# Patient Record
Sex: MALE | Race: WHITE | NOT HISPANIC OR LATINO | Employment: FULL TIME | URBAN - METROPOLITAN AREA
[De-identification: names, ages, dates, MRNs, and addresses within clinical notes are randomized per-mention and may not be internally consistent; named-entity substitution may affect disease eponyms.]

---

## 2015-08-05 LAB — CRC RECOMMENDATION EXT: NORMAL

## 2017-11-01 ENCOUNTER — DOCUMENTATION ONLY (OUTPATIENT)
Dept: TRANSPLANT | Facility: CLINIC | Age: 47
End: 2017-11-01

## 2017-11-14 ENCOUNTER — HISTORICAL (OUTPATIENT)
Dept: RESPIRATORY THERAPY | Facility: HOSPITAL | Age: 47
End: 2017-11-14

## 2018-03-19 ENCOUNTER — HISTORICAL (OUTPATIENT)
Dept: ADMINISTRATIVE | Facility: HOSPITAL | Age: 48
End: 2018-03-19

## 2018-03-19 LAB
ALBUMIN SERPL-MCNC: 4 GM/DL (ref 3.4–5)
ALBUMIN/GLOB SERPL: 1.2 RATIO (ref 1.1–2)
ALP SERPL-CCNC: 92 UNIT/L (ref 50–136)
ALT SERPL-CCNC: 26 UNIT/L (ref 12–78)
AST SERPL-CCNC: 16 UNIT/L (ref 15–37)
BILIRUB SERPL-MCNC: 0.4 MG/DL (ref 0.2–1)
BILIRUBIN DIRECT+TOT PNL SERPL-MCNC: 0.1 MG/DL (ref 0–0.5)
BILIRUBIN DIRECT+TOT PNL SERPL-MCNC: 0.3 MG/DL (ref 0–0.8)
BUN SERPL-MCNC: 18 MG/DL (ref 7–18)
CALCIUM SERPL-MCNC: 9.9 MG/DL (ref 8.5–10.1)
CHLORIDE SERPL-SCNC: 110 MMOL/L (ref 98–107)
CO2 SERPL-SCNC: 26 MMOL/L (ref 21–32)
CREAT SERPL-MCNC: 1.28 MG/DL (ref 0.7–1.3)
GLOBULIN SER-MCNC: 3.4 GM/DL (ref 2.4–3.5)
GLUCOSE SERPL-MCNC: 103 MG/DL (ref 74–106)
POTASSIUM SERPL-SCNC: 4.6 MMOL/L (ref 3.5–5.1)
PROT SERPL-MCNC: 7.4 GM/DL (ref 6.4–8.2)
SODIUM SERPL-SCNC: 144 MMOL/L (ref 136–145)
T4 SERPL-MCNC: 7.4 MCG/DL (ref 4.7–13.3)
TSH SERPL-ACNC: 0.98 MIU/ML (ref 0.36–3.74)

## 2021-02-16 LAB
INFLUENZA A ANTIGEN, POC: NEGATIVE
INFLUENZA B ANTIGEN, POC: NEGATIVE
SARS-COV-2 RNA RESP QL NAA+PROBE: NEGATIVE

## 2021-06-17 ENCOUNTER — HISTORICAL (OUTPATIENT)
Dept: ADMINISTRATIVE | Facility: HOSPITAL | Age: 51
End: 2021-06-17

## 2021-06-17 LAB
ABS NEUT (OLG): 3.78 X10(3)/MCL (ref 2.1–9.2)
ALBUMIN SERPL-MCNC: 4.3 GM/DL (ref 3.5–5)
ALBUMIN/GLOB SERPL: 1.3 RATIO (ref 1.1–2)
ALP SERPL-CCNC: 124 UNIT/L (ref 40–150)
ALT SERPL-CCNC: 21 UNIT/L (ref 0–55)
AST SERPL-CCNC: 21 UNIT/L (ref 5–34)
BASOPHILS # BLD AUTO: 0 X10(3)/MCL (ref 0–0.2)
BASOPHILS NFR BLD AUTO: 1 %
BILIRUB SERPL-MCNC: 1.2 MG/DL
BILIRUBIN DIRECT+TOT PNL SERPL-MCNC: 0.3 MG/DL (ref 0–0.5)
BILIRUBIN DIRECT+TOT PNL SERPL-MCNC: 0.9 MG/DL (ref 0–0.8)
BUN SERPL-MCNC: 17.9 MG/DL (ref 8.4–25.7)
CALCIUM SERPL-MCNC: 10.5 MG/DL (ref 8.4–10.2)
CHLORIDE SERPL-SCNC: 105 MMOL/L (ref 98–107)
CK SERPL-CCNC: 140 U/L (ref 30–200)
CO2 SERPL-SCNC: 29 MMOL/L (ref 22–29)
CREAT SERPL-MCNC: 1.16 MG/DL (ref 0.73–1.18)
EOSINOPHIL # BLD AUTO: 0.1 X10(3)/MCL (ref 0–0.9)
EOSINOPHIL NFR BLD AUTO: 2 %
ERYTHROCYTE [DISTWIDTH] IN BLOOD BY AUTOMATED COUNT: 12.3 % (ref 11.5–14.5)
EST CREAT CLEARANCE SER (OHS): 81.76 ML/MIN
FOLATE SERPL-MCNC: 9.2 NG/ML (ref 7–31.4)
GLOBULIN SER-MCNC: 3.3 GM/DL (ref 2.4–3.5)
GLUCOSE SERPL-MCNC: 98 MG/DL (ref 74–100)
HCT VFR BLD AUTO: 47.2 % (ref 40–51)
HGB BLD-MCNC: 16.7 GM/DL (ref 13.5–17.5)
IMM GRANULOCYTES # BLD AUTO: 0.01 10*3/UL
IMM GRANULOCYTES NFR BLD AUTO: 0 %
LYMPHOCYTES # BLD AUTO: 1.3 X10(3)/MCL (ref 0.6–4.6)
LYMPHOCYTES NFR BLD AUTO: 22 %
MCH RBC QN AUTO: 31.4 PG (ref 26–34)
MCHC RBC AUTO-ENTMCNC: 35.4 GM/DL (ref 31–37)
MCV RBC AUTO: 88.7 FL (ref 80–100)
MONOCYTES # BLD AUTO: 0.5 X10(3)/MCL (ref 0.1–1.3)
MONOCYTES NFR BLD AUTO: 9 %
NEUTROPHILS # BLD AUTO: 3.78 X10(3)/MCL (ref 2.1–9.2)
NEUTROPHILS NFR BLD AUTO: 66 %
NRBC BLD AUTO-RTO: 0 % (ref 0–0.2)
PLATELET # BLD AUTO: 232 X10(3)/MCL (ref 130–400)
PMV BLD AUTO: 10.1 FL (ref 7.4–10.4)
POTASSIUM SERPL-SCNC: 4.1 MMOL/L (ref 3.5–5.1)
PROT SERPL-MCNC: 7.6 GM/DL (ref 6.4–8.3)
RBC # BLD AUTO: 5.32 X10(6)/MCL (ref 4.5–5.9)
SODIUM SERPL-SCNC: 141 MMOL/L (ref 136–145)
TSH SERPL-ACNC: 1.7 UIU/ML (ref 0.35–4.94)
VIT B12 SERPL-MCNC: 375 PG/ML (ref 213–816)
WBC # SPEC AUTO: 5.8 X10(3)/MCL (ref 4.5–11)

## 2021-07-08 ENCOUNTER — HISTORICAL (OUTPATIENT)
Dept: RADIOLOGY | Facility: HOSPITAL | Age: 51
End: 2021-07-08

## 2021-09-01 ENCOUNTER — HISTORICAL (OUTPATIENT)
Dept: RADIOLOGY | Facility: HOSPITAL | Age: 51
End: 2021-09-01

## 2022-02-04 ENCOUNTER — HISTORICAL (OUTPATIENT)
Dept: ADMINISTRATIVE | Facility: HOSPITAL | Age: 52
End: 2022-02-04

## 2022-02-05 LAB — SARS-COV-2 RNA RESP QL NAA+PROBE: NOT DETECTED

## 2022-02-07 ENCOUNTER — HISTORICAL (OUTPATIENT)
Dept: ADMINISTRATIVE | Facility: HOSPITAL | Age: 52
End: 2022-02-07

## 2022-02-11 ENCOUNTER — HISTORICAL (OUTPATIENT)
Dept: ADMINISTRATIVE | Facility: HOSPITAL | Age: 52
End: 2022-02-11

## 2022-02-21 ENCOUNTER — HISTORICAL (OUTPATIENT)
Dept: ADMINISTRATIVE | Facility: HOSPITAL | Age: 52
End: 2022-02-21

## 2022-02-21 LAB
ABS NEUT (OLG): 3.75 (ref 2.1–9.2)
BASOPHILS # BLD AUTO: 0.1 10*3/UL (ref 0–0.2)
BASOPHILS NFR BLD AUTO: 1 %
BUN SERPL-MCNC: 13 MG/DL (ref 8.4–25.7)
CALCIUM SERPL-MCNC: 11.1 MG/DL (ref 8.7–10.5)
CHLORIDE SERPL-SCNC: 103 MMOL/L (ref 98–107)
CO2 SERPL-SCNC: 30 MMOL/L (ref 22–29)
CREAT SERPL-MCNC: 1.18 MG/DL (ref 0.73–1.18)
CREAT/UREA NIT SERPL: 11
DEPRECATED CALCIDIOL+CALCIFEROL SERPL-MC: 27.1 NG/ML (ref 30–80)
EOSINOPHIL # BLD AUTO: 0.1 10*3/UL (ref 0–0.9)
EOSINOPHIL NFR BLD AUTO: 2 %
ERYTHROCYTE [DISTWIDTH] IN BLOOD BY AUTOMATED COUNT: 12.2 % (ref 11.5–17)
GLUCOSE SERPL-MCNC: 101 MG/DL (ref 74–100)
HCT VFR BLD AUTO: 51.1 % (ref 42–52)
HEMOLYSIS INTERF INDEX SERPL-ACNC: 15
HGB BLD-MCNC: 17.1 G/DL (ref 14–18)
ICTERIC INTERF INDEX SERPL-ACNC: 1
LIPEMIC INTERF INDEX SERPL-ACNC: 18
LYMPHOCYTES # BLD AUTO: 1.9 10*3/UL (ref 0.6–4.6)
LYMPHOCYTES NFR BLD AUTO: 30 %
MANUAL DIFF? (OHS): NO
MCH RBC QN AUTO: 30.1 PG (ref 27–31)
MCHC RBC AUTO-ENTMCNC: 33.5 G/DL (ref 33–36)
MCV RBC AUTO: 90 FL (ref 80–94)
MONOCYTES # BLD AUTO: 0.5 10*3/UL (ref 0.1–1.3)
MONOCYTES NFR BLD AUTO: 8 %
NEUTROPHILS # BLD AUTO: 3.75 10*3/UL (ref 2.1–9.2)
NEUTROPHILS NFR BLD AUTO: 59 %
PLATELET # BLD AUTO: 276 10*3/UL (ref 130–400)
PMV BLD AUTO: 9.8 FL (ref 9.4–12.4)
POTASSIUM SERPL-SCNC: 4.7 MMOL/L (ref 3.5–5.1)
RBC # BLD AUTO: 5.68 10*6/UL (ref 4.7–6.1)
SODIUM SERPL-SCNC: 143 MMOL/L (ref 136–145)
WBC # SPEC AUTO: 6.4 10*3/UL (ref 4.5–11.5)

## 2022-03-02 ENCOUNTER — HISTORICAL (OUTPATIENT)
Dept: ADMINISTRATIVE | Facility: HOSPITAL | Age: 52
End: 2022-03-02

## 2022-03-02 LAB
BUN SERPL-MCNC: 12.5 MG/DL (ref 8.4–25.7)
CALCIUM SERPL-MCNC: 11 MG/DL (ref 8.7–10.5)
CHLORIDE SERPL-SCNC: 105 MMOL/L (ref 98–107)
CO2 SERPL-SCNC: 23 MMOL/L (ref 22–29)
CREAT SERPL-MCNC: 1.15 MG/DL (ref 0.73–1.18)
CREAT/UREA NIT SERPL: 11
GLUCOSE SERPL-MCNC: 91 MG/DL (ref 74–100)
HEMOLYSIS INTERF INDEX SERPL-ACNC: 37
ICTERIC INTERF INDEX SERPL-ACNC: 2
LIPEMIC INTERF INDEX SERPL-ACNC: 6
POTASSIUM SERPL-SCNC: 4.8 MMOL/L (ref 3.5–5.1)
PTH-INTACT SERPL-MCNC: 199.2 PG/ML (ref 8.7–77.1)
SODIUM SERPL-SCNC: 140 MMOL/L (ref 136–145)

## 2022-03-07 ENCOUNTER — HISTORICAL (OUTPATIENT)
Dept: ADMINISTRATIVE | Facility: HOSPITAL | Age: 52
End: 2022-03-07

## 2022-03-07 LAB — SARS-COV-2 AG RESP QL IA.RAPID: NEGATIVE

## 2022-03-08 ENCOUNTER — HISTORICAL (OUTPATIENT)
Dept: ADMINISTRATIVE | Facility: HOSPITAL | Age: 52
End: 2022-03-08

## 2022-03-08 ENCOUNTER — HOSPITAL ENCOUNTER (OUTPATIENT)
Dept: ONCOLOGY | Facility: HOSPITAL | Age: 52
End: 2022-03-13
Attending: NEUROLOGICAL SURGERY | Admitting: NEUROLOGICAL SURGERY

## 2022-03-08 LAB
APPEARANCE, UA: NORMAL
BACTERIA SPEC CULT: NORMAL
BILIRUB UR QL STRIP: NEGATIVE
CBG: 86 (ref 70–115)
COLOR UR: YELLOW
GLUCOSE (UA): NEGATIVE
HGB UR QL STRIP: NEGATIVE
KETONES UR QL STRIP: NEGATIVE
LEUKOCYTE ESTERASE UR QL STRIP: NEGATIVE
NITRITE UR QL STRIP: NEGATIVE
PH UR STRIP: 6 [PH] (ref 5–9)
PROT UR QL STRIP: NEGATIVE
RBC #/AREA URNS HPF: NORMAL /[HPF] (ref 0–2)
SP GR UR STRIP: 1.02 (ref 1–1.03)
SQUAMOUS EPITHELIAL, UA: NORMAL (ref 0–4)
UROBILINOGEN UR STRIP-ACNC: 0.2
WBC #/AREA URNS HPF: NORMAL /[HPF] (ref 0–2)

## 2022-03-09 LAB
ABS NEUT (OLG): 5.79 (ref 2.1–9.2)
BASOPHILS # BLD AUTO: 0 10*3/UL (ref 0–0.2)
BASOPHILS NFR BLD AUTO: 1 %
BUN SERPL-MCNC: 11 MG/DL (ref 8.4–25.7)
CALCIUM SERPL-MCNC: 9.2 MG/DL (ref 8.7–10.5)
CHLORIDE SERPL-SCNC: 102 MMOL/L (ref 98–107)
CO2 SERPL-SCNC: 24 MMOL/L (ref 22–29)
CREAT SERPL-MCNC: 0.97 MG/DL (ref 0.73–1.18)
CREAT/UREA NIT SERPL: 11
EOSINOPHIL # BLD AUTO: 0.1 10*3/UL (ref 0–0.9)
EOSINOPHIL NFR BLD AUTO: 2 %
ERYTHROCYTE [DISTWIDTH] IN BLOOD BY AUTOMATED COUNT: 12.4 % (ref 11.5–17)
GLUCOSE SERPL-MCNC: 101 MG/DL (ref 74–100)
HCT VFR BLD AUTO: 42.1 % (ref 42–52)
HEMOLYSIS INTERF INDEX SERPL-ACNC: 1
HGB BLD-MCNC: 14.4 G/DL (ref 14–18)
ICTERIC INTERF INDEX SERPL-ACNC: 1
LIPEMIC INTERF INDEX SERPL-ACNC: 1
LYMPHOCYTES # BLD AUTO: 1.2 10*3/UL (ref 0.6–4.6)
LYMPHOCYTES NFR BLD AUTO: 16 %
MANUAL DIFF? (OHS): NO
MCH RBC QN AUTO: 30.8 PG (ref 27–31)
MCHC RBC AUTO-ENTMCNC: 34.2 G/DL (ref 33–36)
MCV RBC AUTO: 90.1 FL (ref 80–94)
MONOCYTES # BLD AUTO: 0.7 10*3/UL (ref 0.1–1.3)
MONOCYTES NFR BLD AUTO: 9 %
NEUTROPHILS # BLD AUTO: 5.79 10*3/UL (ref 2.1–9.2)
NEUTROPHILS NFR BLD AUTO: 72 %
PLATELET # BLD AUTO: 202 10*3/UL (ref 130–400)
PMV BLD AUTO: 10.1 FL (ref 9.4–12.4)
POTASSIUM SERPL-SCNC: 3.9 MMOL/L (ref 3.5–5.1)
RBC # BLD AUTO: 4.67 10*6/UL (ref 4.7–6.1)
SODIUM SERPL-SCNC: 135 MMOL/L (ref 136–145)
WBC # SPEC AUTO: 8 10*3/UL (ref 4.5–11.5)

## 2022-03-20 ENCOUNTER — HISTORICAL (OUTPATIENT)
Dept: ADMINISTRATIVE | Facility: HOSPITAL | Age: 52
End: 2022-03-20

## 2022-03-21 ENCOUNTER — HISTORICAL (OUTPATIENT)
Dept: ADMINISTRATIVE | Facility: HOSPITAL | Age: 52
End: 2022-03-21

## 2022-04-09 ENCOUNTER — HISTORICAL (OUTPATIENT)
Dept: ADMINISTRATIVE | Facility: HOSPITAL | Age: 52
End: 2022-04-09
Payer: COMMERCIAL

## 2022-04-27 VITALS
SYSTOLIC BLOOD PRESSURE: 144 MMHG | BODY MASS INDEX: 31.23 KG/M2 | WEIGHT: 230.56 LBS | DIASTOLIC BLOOD PRESSURE: 86 MMHG | OXYGEN SATURATION: 97 % | HEIGHT: 72 IN

## 2022-05-02 DIAGNOSIS — E21.0 PRIMARY HYPERPARATHYROIDISM: Primary | ICD-10-CM

## 2022-05-03 ENCOUNTER — LAB VISIT (OUTPATIENT)
Dept: LAB | Facility: HOSPITAL | Age: 52
End: 2022-05-03
Attending: OTOLARYNGOLOGY
Payer: COMMERCIAL

## 2022-05-03 ENCOUNTER — HOSPITAL ENCOUNTER (OUTPATIENT)
Dept: RADIOLOGY | Facility: HOSPITAL | Age: 52
Discharge: HOME | End: 2022-05-03
Attending: OTOLARYNGOLOGY
Payer: COMMERCIAL

## 2022-05-03 DIAGNOSIS — Z01.818 PRE-OP TESTING: ICD-10-CM

## 2022-05-03 LAB
ANION GAP SERPL CALC-SCNC: 9 MEQ/L
BASOPHILS # BLD AUTO: 0.05 X10(3)/MCL (ref 0–0.2)
BASOPHILS NFR BLD AUTO: 0.9 %
BUN SERPL-MCNC: 12.6 MG/DL (ref 8.4–25.7)
CALCIUM SERPL-MCNC: 10.3 MG/DL (ref 8.4–10.2)
CHLORIDE SERPL-SCNC: 104 MMOL/L (ref 98–107)
CO2 SERPL-SCNC: 27 MMOL/L (ref 22–29)
CREAT SERPL-MCNC: 1.08 MG/DL (ref 0.73–1.18)
CREAT/UREA NIT SERPL: 12
EOSINOPHIL # BLD AUTO: 0.17 X10(3)/MCL (ref 0–0.9)
EOSINOPHIL NFR BLD AUTO: 3 %
ERYTHROCYTE [DISTWIDTH] IN BLOOD BY AUTOMATED COUNT: 12.9 % (ref 11.5–17)
GLUCOSE SERPL-MCNC: 90 MG/DL (ref 74–100)
HCT VFR BLD AUTO: 44.1 % (ref 42–52)
HGB BLD-MCNC: 14.8 GM/DL (ref 14–18)
IMM GRANULOCYTES # BLD AUTO: 0.02 X10(3)/MCL (ref 0–0.02)
IMM GRANULOCYTES NFR BLD AUTO: 0.4 % (ref 0–0.43)
LYMPHOCYTES # BLD AUTO: 2.13 X10(3)/MCL (ref 0.6–4.6)
LYMPHOCYTES NFR BLD AUTO: 37.9 %
MCH RBC QN AUTO: 30 PG (ref 27–31)
MCHC RBC AUTO-ENTMCNC: 33.6 MG/DL (ref 33–36)
MCV RBC AUTO: 89.5 FL (ref 80–94)
MONOCYTES # BLD AUTO: 0.5 X10(3)/MCL (ref 0.1–1.3)
MONOCYTES NFR BLD AUTO: 8.9 %
NEUTROPHILS # BLD AUTO: 2.8 X10(3)/MCL (ref 2.1–9.2)
NEUTROPHILS NFR BLD AUTO: 48.9 %
NRBC BLD AUTO-RTO: 0 %
PLATELET # BLD AUTO: 327 X10(3)/MCL (ref 130–400)
PMV BLD AUTO: 9.6 FL (ref 9.4–12.4)
POTASSIUM SERPL-SCNC: 4.8 MMOL/L (ref 3.5–5.1)
RBC # BLD AUTO: 4.93 X10(6)/MCL (ref 4.7–6.1)
SARS-COV-2 RNA RESP QL NAA+PROBE: NOT DETECTED
SODIUM SERPL-SCNC: 140 MMOL/L (ref 136–145)
WBC # SPEC AUTO: 5.6 X10(3)/MCL (ref 4.5–11.5)

## 2022-05-03 PROCEDURE — 82310 ASSAY OF CALCIUM: CPT

## 2022-05-03 PROCEDURE — 85025 COMPLETE CBC W/AUTO DIFF WBC: CPT

## 2022-05-03 PROCEDURE — 87635 SARS-COV-2 COVID-19 AMP PRB: CPT

## 2022-05-03 PROCEDURE — 93005 ELECTROCARDIOGRAM TRACING: CPT

## 2022-05-03 PROCEDURE — 93010 EKG 12-LEAD: ICD-10-PCS | Mod: ,,, | Performed by: INTERNAL MEDICINE

## 2022-05-03 PROCEDURE — 93010 ELECTROCARDIOGRAM REPORT: CPT | Mod: ,,, | Performed by: INTERNAL MEDICINE

## 2022-05-03 PROCEDURE — 71046 X-RAY EXAM CHEST 2 VIEWS: CPT | Mod: TC

## 2022-05-03 PROCEDURE — 36415 COLL VENOUS BLD VENIPUNCTURE: CPT

## 2022-05-04 ENCOUNTER — HOSPITAL ENCOUNTER (OUTPATIENT)
Dept: RADIOLOGY | Facility: HOSPITAL | Age: 52
Discharge: HOME | End: 2022-05-04
Attending: OTOLARYNGOLOGY
Payer: COMMERCIAL

## 2022-05-04 ENCOUNTER — ANESTHESIA EVENT (OUTPATIENT)
Dept: SURGERY | Facility: HOSPITAL | Age: 52
End: 2022-05-04
Payer: COMMERCIAL

## 2022-05-04 DIAGNOSIS — E21.0 PRIMARY HYPERPARATHYROIDISM: ICD-10-CM

## 2022-05-04 PROCEDURE — 78071 PARATHYRD PLANAR W/WO SUBTRJ: CPT | Mod: TC

## 2022-05-05 RX ORDER — GABAPENTIN 300 MG/1
300 CAPSULE ORAL DAILY
COMMUNITY
End: 2022-06-13

## 2022-05-05 RX ORDER — ORPHENADRINE CITRATE 100 MG/1
100 TABLET, EXTENDED RELEASE ORAL 2 TIMES DAILY
COMMUNITY
End: 2022-06-13 | Stop reason: SDUPTHER

## 2022-05-05 RX ORDER — OLMESARTAN MEDOXOMIL 5 MG/1
TABLET ORAL 2 TIMES DAILY
COMMUNITY
End: 2022-05-12 | Stop reason: SDUPTHER

## 2022-05-06 ENCOUNTER — HOSPITAL ENCOUNTER (OUTPATIENT)
Facility: HOSPITAL | Age: 52
Discharge: HOME | End: 2022-05-07
Attending: OTOLARYNGOLOGY | Admitting: OTOLARYNGOLOGY
Payer: COMMERCIAL

## 2022-05-06 ENCOUNTER — ANESTHESIA (OUTPATIENT)
Dept: SURGERY | Facility: HOSPITAL | Age: 52
End: 2022-05-06
Payer: COMMERCIAL

## 2022-05-06 DIAGNOSIS — E21.0 PRIMARY HYPERPARATHYROIDISM: ICD-10-CM

## 2022-05-06 DIAGNOSIS — Z01.818 PRE-OP TESTING: ICD-10-CM

## 2022-05-06 DIAGNOSIS — E21.0 HYPERPARATHYROIDISM, PRIMARY: Primary | ICD-10-CM

## 2022-05-06 LAB
PTH-INTACT SERPL-MCNC: 161.1 PG/ML (ref 8.7–77)
PTH-INTACT SERPL-MCNC: 73.1 PG/ML (ref 8.7–77)
TSH SERPL-ACNC: 1.48 UIU/ML (ref 0.35–4.94)

## 2022-05-06 PROCEDURE — 36415 COLL VENOUS BLD VENIPUNCTURE: CPT | Performed by: OTOLARYNGOLOGY

## 2022-05-06 PROCEDURE — 63600175 PHARM REV CODE 636 W HCPCS: Performed by: OTOLARYNGOLOGY

## 2022-05-06 PROCEDURE — 25000003 PHARM REV CODE 250: Performed by: OTOLARYNGOLOGY

## 2022-05-06 PROCEDURE — 96372 THER/PROPH/DIAG INJ SC/IM: CPT | Mod: 59

## 2022-05-06 PROCEDURE — 36000706: Performed by: OTOLARYNGOLOGY

## 2022-05-06 PROCEDURE — 63600175 PHARM REV CODE 636 W HCPCS: Performed by: NURSE ANESTHETIST, CERTIFIED REGISTERED

## 2022-05-06 PROCEDURE — 96375 TX/PRO/DX INJ NEW DRUG ADDON: CPT | Mod: 59

## 2022-05-06 PROCEDURE — 36000707: Performed by: OTOLARYNGOLOGY

## 2022-05-06 PROCEDURE — 37000008 HC ANESTHESIA 1ST 15 MINUTES: Performed by: OTOLARYNGOLOGY

## 2022-05-06 PROCEDURE — 83970 ASSAY OF PARATHORMONE: CPT | Performed by: OTOLARYNGOLOGY

## 2022-05-06 PROCEDURE — 84443 ASSAY THYROID STIM HORMONE: CPT | Performed by: OTOLARYNGOLOGY

## 2022-05-06 PROCEDURE — C1729 CATH, DRAINAGE: HCPCS | Performed by: OTOLARYNGOLOGY

## 2022-05-06 PROCEDURE — 63600175 PHARM REV CODE 636 W HCPCS

## 2022-05-06 PROCEDURE — 71000033 HC RECOVERY, INTIAL HOUR: Performed by: OTOLARYNGOLOGY

## 2022-05-06 PROCEDURE — 25000003 PHARM REV CODE 250: Performed by: NURSE ANESTHETIST, CERTIFIED REGISTERED

## 2022-05-06 PROCEDURE — G0378 HOSPITAL OBSERVATION PER HR: HCPCS

## 2022-05-06 PROCEDURE — 27201423 OPTIME MED/SURG SUP & DEVICES STERILE SUPPLY: Performed by: OTOLARYNGOLOGY

## 2022-05-06 PROCEDURE — 37000009 HC ANESTHESIA EA ADD 15 MINS: Performed by: OTOLARYNGOLOGY

## 2022-05-06 PROCEDURE — 71000039 HC RECOVERY, EACH ADD'L HOUR: Performed by: OTOLARYNGOLOGY

## 2022-05-06 RX ORDER — DEXTROSE MONOHYDRATE, SODIUM CHLORIDE, AND POTASSIUM CHLORIDE 50; 1.49; 4.5 G/1000ML; G/1000ML; G/1000ML
INJECTION, SOLUTION INTRAVENOUS CONTINUOUS
Status: DISCONTINUED | OUTPATIENT
Start: 2022-05-06 | End: 2022-05-07 | Stop reason: HOSPADM

## 2022-05-06 RX ORDER — ONDANSETRON 2 MG/ML
4 INJECTION INTRAMUSCULAR; INTRAVENOUS EVERY 12 HOURS PRN
Status: DISCONTINUED | OUTPATIENT
Start: 2022-05-06 | End: 2022-05-07 | Stop reason: HOSPADM

## 2022-05-06 RX ORDER — LOSARTAN POTASSIUM 25 MG/1
25 TABLET ORAL DAILY
Status: DISCONTINUED | OUTPATIENT
Start: 2022-05-07 | End: 2022-05-07 | Stop reason: HOSPADM

## 2022-05-06 RX ORDER — ONDANSETRON 2 MG/ML
4 INJECTION INTRAMUSCULAR; INTRAVENOUS DAILY PRN
Status: DISCONTINUED | OUTPATIENT
Start: 2022-05-06 | End: 2022-05-07 | Stop reason: HOSPADM

## 2022-05-06 RX ORDER — FENTANYL CITRATE 50 UG/ML
INJECTION, SOLUTION INTRAMUSCULAR; INTRAVENOUS
Status: DISCONTINUED | OUTPATIENT
Start: 2022-05-06 | End: 2022-05-06

## 2022-05-06 RX ORDER — SUCCINYLCHOLINE CHLORIDE 20 MG/ML
INJECTION INTRAMUSCULAR; INTRAVENOUS
Status: DISCONTINUED | OUTPATIENT
Start: 2022-05-06 | End: 2022-05-06

## 2022-05-06 RX ORDER — CLINDAMYCIN PHOSPHATE 900 MG/50ML
900 INJECTION, SOLUTION INTRAVENOUS
Status: DISCONTINUED | OUTPATIENT
Start: 2022-05-06 | End: 2022-05-06

## 2022-05-06 RX ORDER — MIDAZOLAM HYDROCHLORIDE 1 MG/ML
INJECTION INTRAMUSCULAR; INTRAVENOUS
Status: DISCONTINUED | OUTPATIENT
Start: 2022-05-06 | End: 2022-05-06

## 2022-05-06 RX ORDER — TRAMADOL HYDROCHLORIDE 50 MG/1
50 TABLET ORAL EVERY 6 HOURS PRN
Status: ON HOLD | COMMUNITY
Start: 2022-03-23 | End: 2022-05-07 | Stop reason: SDUPTHER

## 2022-05-06 RX ORDER — CLINDAMYCIN PHOSPHATE 600 MG/50ML
600 INJECTION, SOLUTION INTRAVENOUS
Status: DISCONTINUED | OUTPATIENT
Start: 2022-05-07 | End: 2022-05-07 | Stop reason: HOSPADM

## 2022-05-06 RX ORDER — LIDOCAINE HYDROCHLORIDE AND EPINEPHRINE 20; 10 MG/ML; UG/ML
INJECTION, SOLUTION INFILTRATION; PERINEURAL
Status: DISCONTINUED | OUTPATIENT
Start: 2022-05-06 | End: 2022-05-06 | Stop reason: HOSPADM

## 2022-05-06 RX ORDER — OXYCODONE HYDROCHLORIDE 5 MG/1
5 TABLET ORAL
Status: DISCONTINUED | OUTPATIENT
Start: 2022-05-06 | End: 2022-05-07 | Stop reason: HOSPADM

## 2022-05-06 RX ORDER — PROMETHAZINE HYDROCHLORIDE 25 MG/ML
6.25 INJECTION, SOLUTION INTRAMUSCULAR; INTRAVENOUS ONCE
Status: COMPLETED | OUTPATIENT
Start: 2022-05-06 | End: 2022-05-06

## 2022-05-06 RX ORDER — SODIUM CHLORIDE 0.9 % (FLUSH) 0.9 %
10 SYRINGE (ML) INJECTION
Status: DISCONTINUED | OUTPATIENT
Start: 2022-05-06 | End: 2022-05-07 | Stop reason: HOSPADM

## 2022-05-06 RX ORDER — PROCHLORPERAZINE EDISYLATE 5 MG/ML
5 INJECTION INTRAMUSCULAR; INTRAVENOUS EVERY 6 HOURS PRN
Status: DISCONTINUED | OUTPATIENT
Start: 2022-05-06 | End: 2022-05-07 | Stop reason: HOSPADM

## 2022-05-06 RX ORDER — PROPOFOL 10 MG/ML
VIAL (ML) INTRAVENOUS
Status: DISCONTINUED | OUTPATIENT
Start: 2022-05-06 | End: 2022-05-06

## 2022-05-06 RX ORDER — CLINDAMYCIN PHOSPHATE 900 MG/50ML
INJECTION, SOLUTION INTRAVENOUS
Status: DISCONTINUED | OUTPATIENT
Start: 2022-05-06 | End: 2022-05-06

## 2022-05-06 RX ORDER — ORPHENADRINE CITRATE 100 MG/1
100 TABLET, EXTENDED RELEASE ORAL 2 TIMES DAILY
Status: DISCONTINUED | OUTPATIENT
Start: 2022-05-06 | End: 2022-05-07 | Stop reason: HOSPADM

## 2022-05-06 RX ORDER — GABAPENTIN 300 MG/1
300 CAPSULE ORAL DAILY
Status: DISCONTINUED | OUTPATIENT
Start: 2022-05-07 | End: 2022-05-07 | Stop reason: HOSPADM

## 2022-05-06 RX ORDER — FENTANYL CITRATE 50 UG/ML
25 INJECTION, SOLUTION INTRAMUSCULAR; INTRAVENOUS EVERY 5 MIN PRN
Status: DISCONTINUED | OUTPATIENT
Start: 2022-05-06 | End: 2022-05-07 | Stop reason: HOSPADM

## 2022-05-06 RX ORDER — ROCURONIUM BROMIDE 10 MG/ML
INJECTION, SOLUTION INTRAVENOUS
Status: DISCONTINUED | OUTPATIENT
Start: 2022-05-06 | End: 2022-05-06

## 2022-05-06 RX ORDER — TRAMADOL HYDROCHLORIDE 50 MG/1
100 TABLET ORAL EVERY 6 HOURS PRN
Status: DISCONTINUED | OUTPATIENT
Start: 2022-05-06 | End: 2022-05-07 | Stop reason: HOSPADM

## 2022-05-06 RX ORDER — PROMETHAZINE HYDROCHLORIDE 25 MG/ML
INJECTION, SOLUTION INTRAMUSCULAR; INTRAVENOUS
Status: COMPLETED
Start: 2022-05-06 | End: 2022-05-06

## 2022-05-06 RX ORDER — PHENYLEPHRINE HCL IN 0.9% NACL 1 MG/10 ML
SYRINGE (ML) INTRAVENOUS
Status: DISCONTINUED | OUTPATIENT
Start: 2022-05-06 | End: 2022-05-06

## 2022-05-06 RX ADMIN — Medication 100 MCG: at 06:05

## 2022-05-06 RX ADMIN — ROCURONIUM BROMIDE 5 MG: 10 SOLUTION INTRAVENOUS at 05:05

## 2022-05-06 RX ADMIN — FENTANYL CITRATE 100 MCG: 50 INJECTION, SOLUTION INTRAMUSCULAR; INTRAVENOUS at 05:05

## 2022-05-06 RX ADMIN — ONDANSETRON 4 MG: 2 INJECTION INTRAMUSCULAR; INTRAVENOUS at 07:05

## 2022-05-06 RX ADMIN — FENTANYL CITRATE 50 MCG: 50 INJECTION, SOLUTION INTRAMUSCULAR; INTRAVENOUS at 06:05

## 2022-05-06 RX ADMIN — PROPOFOL 150 MG: 10 INJECTION, EMULSION INTRAVENOUS at 05:05

## 2022-05-06 RX ADMIN — MIDAZOLAM HYDROCHLORIDE 2 MG: 1 INJECTION, SOLUTION INTRAMUSCULAR; INTRAVENOUS at 05:05

## 2022-05-06 RX ADMIN — Medication 150 MCG: at 05:05

## 2022-05-06 RX ADMIN — ORPHENADRINE CITRATE 100 MG: 100 TABLET, EXTENDED RELEASE ORAL at 11:05

## 2022-05-06 RX ADMIN — SUCCINYLCHOLINE CHLORIDE 140 MG: 20 INJECTION, SOLUTION INTRAMUSCULAR; INTRAVENOUS at 05:05

## 2022-05-06 RX ADMIN — Medication 100 MCG: at 05:05

## 2022-05-06 RX ADMIN — CLINDAMYCIN IN 5 PERCENT DEXTROSE 900 MG: 18 INJECTION, SOLUTION INTRAVENOUS at 05:05

## 2022-05-06 RX ADMIN — PROMETHAZINE HYDROCHLORIDE 6.25 MG: 25 INJECTION, SOLUTION INTRAMUSCULAR; INTRAVENOUS at 09:05

## 2022-05-06 NOTE — ANESTHESIA PREPROCEDURE EVALUATION
05/06/2022  Eb Alfaro is a 51 y.o., male.      Pre-op Assessment    I have reviewed the Patient Summary Reports.          Review of Systems  Anesthesia Hx:  History of prior surgery of interest to airway management or planning: Previous anesthesia: General       Physical Exam  General: Well nourished    Airway:  Mallampati: III   Mouth Opening: Normal  TM Distance: Normal  Tongue: Normal  Neck ROM: Extension Decreased    Dental:  Intact        Anesthesia Plan  Type of Anesthesia, risks & benefits discussed:    Anesthesia Type: Gen ETT  Intra-op Monitoring Plan: Standard ASA Monitors  Post Op Pain Control Plan: IV/PO Opioids PRN  Induction:  IV  Airway Plan: Direct and Video  Informed Consent: Informed consent signed with the Patient and all parties understand the risks and agree with anesthesia plan.  All questions answered. Patient consented to blood products? Yes  ASA Score: 2    Ready For Surgery From Anesthesia Perspective.     .

## 2022-05-06 NOTE — ANESTHESIA PREPROCEDURE EVALUATION
05/06/2022  Eb Alfaro is a 51 y.o., male.      Pre-op Assessment    I have reviewed the Patient Summary Reports.     I have reviewed the Nursing Notes. I have reviewed the NPO Status.   I have reviewed the Medications.     Review of Systems  Anesthesia Hx:  No problems with previous Anesthesia  History of prior surgery of interest to airway management or planning:   Cardiovascular:   Hypertension  Functional Capacity good / => 4 METS        Physical Exam  General: Well nourished, Cooperative, Alert and Oriented    Airway:  Mouth Opening: Normal  TM Distance: Normal  Tongue: Normal  Neck ROM: Normal ROM    Dental:  Intact    Chest/Lungs:  Clear to auscultation, Normal Respiratory Rate    Heart:  Rate: Normal  Rhythm: Regular Rhythm        Anesthesia Plan  Type of Anesthesia, risks & benefits discussed:    Anesthesia Type: Gen ETT  Intra-op Monitoring Plan: Standard ASA Monitors  Post Op Pain Control Plan: multimodal analgesia and IV/PO Opioids PRN  Induction:  IV  Airway Plan: Direct  Informed Consent: Informed consent signed with the Patient and all parties understand the risks and agree with anesthesia plan.  All questions answered.   Day of Surgery Review of History & Physical: H&P Update referred to the surgeon/provider.  Anesthesia Plan Notes: Induction with succinylcholine if monitoring recurrent laryngeal    Ready For Surgery From Anesthesia Perspective.     .

## 2022-05-07 VITALS
OXYGEN SATURATION: 98 % | HEART RATE: 74 BPM | TEMPERATURE: 99 F | RESPIRATION RATE: 18 BRPM | DIASTOLIC BLOOD PRESSURE: 84 MMHG | SYSTOLIC BLOOD PRESSURE: 150 MMHG

## 2022-05-07 PROBLEM — E21.0 HYPERPARATHYROIDISM, PRIMARY: Status: ACTIVE | Noted: 2022-05-07

## 2022-05-07 LAB
ALBUMIN SERPL-MCNC: 3.1 GM/DL (ref 3.5–5)
CALCIUM SERPL-MCNC: 8.5 MG/DL (ref 8.4–10.2)

## 2022-05-07 PROCEDURE — 25000003 PHARM REV CODE 250: Performed by: OTOLARYNGOLOGY

## 2022-05-07 PROCEDURE — 82310 ASSAY OF CALCIUM: CPT | Performed by: OTOLARYNGOLOGY

## 2022-05-07 PROCEDURE — G0378 HOSPITAL OBSERVATION PER HR: HCPCS

## 2022-05-07 PROCEDURE — 36415 COLL VENOUS BLD VENIPUNCTURE: CPT | Performed by: OTOLARYNGOLOGY

## 2022-05-07 PROCEDURE — 82040 ASSAY OF SERUM ALBUMIN: CPT | Performed by: OTOLARYNGOLOGY

## 2022-05-07 PROCEDURE — 96365 THER/PROPH/DIAG IV INF INIT: CPT

## 2022-05-07 PROCEDURE — 96361 HYDRATE IV INFUSION ADD-ON: CPT

## 2022-05-07 PROCEDURE — 63600175 PHARM REV CODE 636 W HCPCS: Performed by: OTOLARYNGOLOGY

## 2022-05-07 RX ORDER — TRAMADOL HYDROCHLORIDE 50 MG/1
50 TABLET ORAL EVERY 6 HOURS PRN
Qty: 24 TABLET | Refills: 0 | Status: SHIPPED | OUTPATIENT
Start: 2022-05-07 | End: 2022-12-06

## 2022-05-07 RX ADMIN — TRAMADOL HYDROCHLORIDE 100 MG: 50 TABLET, COATED ORAL at 10:05

## 2022-05-07 RX ADMIN — ORPHENADRINE CITRATE 100 MG: 100 TABLET, EXTENDED RELEASE ORAL at 10:05

## 2022-05-07 RX ADMIN — POTASSIUM CHLORIDE, DEXTROSE MONOHYDRATE AND SODIUM CHLORIDE: 150; 5; 450 INJECTION, SOLUTION INTRAVENOUS at 12:05

## 2022-05-07 RX ADMIN — CLINDAMYCIN IN 5 PERCENT DEXTROSE 600 MG: 12 INJECTION, SOLUTION INTRAVENOUS at 02:05

## 2022-05-07 NOTE — OP NOTE
Dictation Op Note      Surgeon: Ajay Blum     Procedure: PARATHYROIDECTOMY    Anesthesia: General    EBL:  Minimal    Specimens:  Left inferior parathyroid gland    Complications: .or    Preop Diagnosis: Pre-Op Diagnosis Codes:     * Hyperparathyroidism, primary [E21.0]    Postop Diagnosis: Post-Op Diagnosis Codes:     * Hyperparathyroidism, primary [E21.0]    Findings:  1.5 cm left inferior parathyroid gland consistent on frozen section with a hypercellular back depleted gland which can be seen and hyperplasia or adenoma    Procedure in Detail:    After successful orotracheal intubation using an EMG tube the nerve integrity monitor was connected along with a ground reference electrode felt to be working properly.  A natural occurring skin crease CHCF between the cricoid cartilage and sternal notch was selected and injected with 7 cc of 2% xylocaine with 1 100,000 epinephrine.  The neck was then prepped and draped in the usual fashion.    A skin incision was made down through the platysma the subplatysmal flaps were elevated in the superior and inferior directions.  An incision was made along the anterior aspect of sternocleidomastoid muscle and the sternocleidomastoid muscle along with the carotid sheath contents including the carotid artery, vagus nerve and internal jugular vein were all retracted laterally exposing the inferior pole of the thyroid gland.  Dissecting along we identified a bilobed 1.5 cm mass that was consistent with parathyroid.  Using short and blunt dissection the parathyroid gland was removed preserving recurrent laryngeal nerve.  The gland was submitted for frozen section and came back consistent with hypercellular gland that was patched depleted and could be seen with either parathyroid hyperplasia or adenoma.  15 minutes after removing the gland an intraoperative PTH level was drawn and came back at 73 compared to her preoperative level of 161. At this point we then placed a  small 10 Armenian TLS drain in the left neck and secured using a 3-0 Vicryl.  The platysma was closed using sub cutaneous 3-0 Vicryl.  The skin was closed using a subcuticular 3-0 Prolene.  Skin margins were then prepped using Mastisol and Steri-Strips applied.  4x4s were placed on top of the Steri-Strips and secured using a medium-sized Tegaderm.  A left wake up was extubated and transferred back to recovery room in stable condition.  There were no complications of surgery anesthesia.

## 2022-05-07 NOTE — PLAN OF CARE
Problem: Adult Inpatient Plan of Care  Goal: Plan of Care Review  Outcome: Ongoing, Progressing  Flowsheets (Taken 5/7/2022 0327)  Plan of Care Reviewed With:   patient   spouse  Goal: Patient-Specific Goal (Individualized)  Outcome: Ongoing, Progressing  Goal: Absence of Hospital-Acquired Illness or Injury  Outcome: Ongoing, Progressing  Intervention: Identify and Manage Fall Risk  Flowsheets (Taken 5/7/2022 0327)  Safety Promotion/Fall Prevention:   assistive device/personal item within reach   medications reviewed   high risk medications identified  Intervention: Prevent Skin Injury  Flowsheets (Taken 5/7/2022 0327)  Body Position:   position changed independently   30 degrees   right  Intervention: Prevent and Manage VTE (Venous Thromboembolism) Risk  Flowsheets (Taken 5/7/2022 0327)  Activity Management:   Ambulated -L4   Ambulated to bathroom - L4   Sitting at edge of bed - L2  VTE Prevention/Management:   remove, assess skin, and reapply sequential compression device   dorsiflexion/plantar flexion performed  Goal: Optimal Comfort and Wellbeing  Outcome: Ongoing, Progressing  Intervention: Monitor Pain and Promote Comfort  Flowsheets (Taken 5/7/2022 0327)  Pain Management Interventions:   medication offered but refused   pain management plan reviewed with patient/caregiver   pillow support provided  Intervention: Provide Person-Centered Care  Flowsheets (Taken 5/7/2022 0327)  Trust Relationship/Rapport:   care explained   choices provided   emotional support provided   empathic listening provided   questions answered   questions encouraged   reassurance provided   thoughts/feelings acknowledged  Goal: Readiness for Transition of Care  Outcome: Ongoing, Progressing  Intervention: Mutually Develop Transition Plan  Flowsheets (Taken 5/7/2022 0327)  Transportation Anticipated: family or friend will provide

## 2022-05-07 NOTE — TRANSFER OF CARE
Anesthesia Transfer of Care Note    Patient: Eb Alfaro    Procedure(s) Performed: Procedure(s) (LRB):  PARATHYROIDECTOMY (N/A)    Patient location: PACU    Anesthesia Type: general    Transport from OR: Transported from OR on room air with adequate spontaneous ventilation    Post pain: adequate analgesia    Post assessment: no apparent anesthetic complications    Post vital signs: stable    Level of consciousness: sedated    Nausea/Vomiting: no nausea/vomiting    Transfer of care protocol was followed      Last vitals:   Visit Vitals  BP (!) 169/84   Pulse 84   Temp 36.4 °C (97.5 °F) (Oral)   SpO2 96%

## 2022-05-07 NOTE — DISCHARGE SUMMARY
[facility] - Observation Unit  Otorhinolaryngology-Head & Neck Surgery  Discharge Summary      Patient Name: Eb Alfaro  MRN: [id number]  Admission Date: 5/6/2022  Hospital Length of Stay: 0 days  Discharge Date and Time:  05/07/2022 11:21 AM  Attending Physician: Ajay Blum MD   Discharging Provider: Ajay Blum MD  Primary Care Provider: Nick Monzon MD       Procedure(s) (LRB):  PARATHYROIDECTOMY (N/A)     Hospital Course:  Patient is a 51-year-old who underwent a minimally invasive left superior parathyroidectomy for primary hyperparathyroidism.  His preop PTH morning of surgery was 161 and fell down to a level of 73 after 15 minutes.  This morning his corrected calcium is 9.2.  He is awake alert tolerating his diet having no difficulty with speech or swallowing.  Drain output has been minimal.  Will remove the drain and leave the dressing for another day or 2.  He has been instructed on signs and symptoms of hypocalcemia which to do if he has any problems.  We will keep him on tramadol for pain since he is not very happy taking narcotics.  He can follow up my office on Tuesday.    Consults:     Significant Diagnostic Studies:     Pending Diagnostic Studies:     None        Final Active Diagnoses:    Diagnosis Date Noted POA    PRINCIPAL PROBLEM:  Hyperparathyroidism, primary [E21.0] 05/07/2022 Unknown      Problems Resolved During this Admission:      Discharged Condition: good    Disposition: Home or Self Care    Follow Up:   Follow-up Information     Ajay Blum MD Follow up.    Specialty: Otolaryngology  Contact information:  [address] [id number] [phone]                       Patient Instructions:      X-Ray Chest PA And Lateral   Standing Status: Future Number of Occurrences: 1 Standing Exp. Date: 05/03/23     Order Specific Question Answer Comments   May the Radiologist modify the order per protocol to meet  the clinical needs of the patient? Yes    Release to patient Immediate      COVID-19 Routine Screening   Standing Status: Future Number of Occurrences: 1 Standing Exp. Date: 07/02/23     Order Specific Question Answer Comments   Is the patient symptomatic? No    Is this needed for pre-procedure or pre-op testing? Yes    Diagnosis: Pre-op testing [194069]      CBC Auto Differential   Standing Status: Future Number of Occurrences: 1 Standing Exp. Date: 07/02/23     Basic Metabolic Panel   Standing Status: Future Number of Occurrences: 1 Standing Exp. Date: 07/02/23     Lifting restrictions     Remove dressing in 48 hours   Order Comments: Keep steristrips dry     EKG 12-lead   Standing Status: Future Number of Occurrences: 1 Standing Exp. Date: 05/03/23     Medications:  Reconciled Home Medications:      Medication List      CONTINUE taking these medications    gabapentin 300 MG capsule  Commonly known as: NEURONTIN  Take 300 mg by mouth Daily.     olmesartan 5 MG Tab  Commonly known as: BENICAR  Take by mouth 2 (two) times a day.     orphenadrine 100 mg tablet  Commonly known as: NORFLEX  Take 100 mg by mouth 2 (two) times daily.     traMADoL 50 mg tablet  Commonly known as: ULTRAM  Take 1 tablet (50 mg total) by mouth every 6 (six) hours as needed.            Ajay Blum MD  Otorhinolaryngology-Head & Neck Surgery  [facility] - Observation Unit

## 2022-05-10 ENCOUNTER — PATIENT MESSAGE (OUTPATIENT)
Dept: PRIMARY CARE CLINIC | Facility: CLINIC | Age: 52
End: 2022-05-10
Payer: COMMERCIAL

## 2022-05-10 NOTE — TELEPHONE ENCOUNTER
Looks like Mr. Alfaro had surgery and was discharged on 5/6/22. Can we get him an appointment, preferably before 5/20? This will be a hospital f/u ([facility]).       Thanks    Nick Monzon MD

## 2022-05-12 ENCOUNTER — OFFICE VISIT (OUTPATIENT)
Dept: PRIMARY CARE CLINIC | Facility: CLINIC | Age: 52
End: 2022-05-12
Payer: COMMERCIAL

## 2022-05-12 VITALS
HEART RATE: 68 BPM | WEIGHT: 221 LBS | DIASTOLIC BLOOD PRESSURE: 90 MMHG | HEIGHT: 72 IN | SYSTOLIC BLOOD PRESSURE: 140 MMHG | BODY MASS INDEX: 29.93 KG/M2 | OXYGEN SATURATION: 98 % | RESPIRATION RATE: 18 BRPM

## 2022-05-12 DIAGNOSIS — E21.0 HYPERPARATHYROIDISM, PRIMARY: ICD-10-CM

## 2022-05-12 DIAGNOSIS — M43.22 CERVICAL VERTEBRAL FUSION: ICD-10-CM

## 2022-05-12 DIAGNOSIS — Z98.890 HISTORY OF LUMBAR LAMINECTOMY: ICD-10-CM

## 2022-05-12 DIAGNOSIS — I10 HYPERTENSION, UNSPECIFIED TYPE: Primary | ICD-10-CM

## 2022-05-12 PROCEDURE — 1160F RVW MEDS BY RX/DR IN RCRD: CPT | Mod: CPTII,,, | Performed by: STUDENT IN AN ORGANIZED HEALTH CARE EDUCATION/TRAINING PROGRAM

## 2022-05-12 PROCEDURE — 99214 PR OFFICE/OUTPT VISIT, EST, LEVL IV, 30-39 MIN: ICD-10-PCS | Mod: ,,, | Performed by: STUDENT IN AN ORGANIZED HEALTH CARE EDUCATION/TRAINING PROGRAM

## 2022-05-12 PROCEDURE — 1159F PR MEDICATION LIST DOCUMENTED IN MEDICAL RECORD: ICD-10-PCS | Mod: CPTII,,, | Performed by: STUDENT IN AN ORGANIZED HEALTH CARE EDUCATION/TRAINING PROGRAM

## 2022-05-12 PROCEDURE — 99214 OFFICE O/P EST MOD 30 MIN: CPT | Mod: ,,, | Performed by: STUDENT IN AN ORGANIZED HEALTH CARE EDUCATION/TRAINING PROGRAM

## 2022-05-12 PROCEDURE — 3077F SYST BP >= 140 MM HG: CPT | Mod: CPTII,,, | Performed by: STUDENT IN AN ORGANIZED HEALTH CARE EDUCATION/TRAINING PROGRAM

## 2022-05-12 PROCEDURE — 4010F PR ACE/ARB THEARPY RXD/TAKEN: ICD-10-PCS | Mod: CPTII,,, | Performed by: STUDENT IN AN ORGANIZED HEALTH CARE EDUCATION/TRAINING PROGRAM

## 2022-05-12 PROCEDURE — 3008F BODY MASS INDEX DOCD: CPT | Mod: CPTII,,, | Performed by: STUDENT IN AN ORGANIZED HEALTH CARE EDUCATION/TRAINING PROGRAM

## 2022-05-12 PROCEDURE — 3077F PR MOST RECENT SYSTOLIC BLOOD PRESSURE >= 140 MM HG: ICD-10-PCS | Mod: CPTII,,, | Performed by: STUDENT IN AN ORGANIZED HEALTH CARE EDUCATION/TRAINING PROGRAM

## 2022-05-12 PROCEDURE — 3080F DIAST BP >= 90 MM HG: CPT | Mod: CPTII,,, | Performed by: STUDENT IN AN ORGANIZED HEALTH CARE EDUCATION/TRAINING PROGRAM

## 2022-05-12 PROCEDURE — 1160F PR REVIEW ALL MEDS BY PRESCRIBER/CLIN PHARMACIST DOCUMENTED: ICD-10-PCS | Mod: CPTII,,, | Performed by: STUDENT IN AN ORGANIZED HEALTH CARE EDUCATION/TRAINING PROGRAM

## 2022-05-12 PROCEDURE — 3080F PR MOST RECENT DIASTOLIC BLOOD PRESSURE >= 90 MM HG: ICD-10-PCS | Mod: CPTII,,, | Performed by: STUDENT IN AN ORGANIZED HEALTH CARE EDUCATION/TRAINING PROGRAM

## 2022-05-12 PROCEDURE — 4010F ACE/ARB THERAPY RXD/TAKEN: CPT | Mod: CPTII,,, | Performed by: STUDENT IN AN ORGANIZED HEALTH CARE EDUCATION/TRAINING PROGRAM

## 2022-05-12 PROCEDURE — 3008F PR BODY MASS INDEX (BMI) DOCUMENTED: ICD-10-PCS | Mod: CPTII,,, | Performed by: STUDENT IN AN ORGANIZED HEALTH CARE EDUCATION/TRAINING PROGRAM

## 2022-05-12 PROCEDURE — 1159F MED LIST DOCD IN RCRD: CPT | Mod: CPTII,,, | Performed by: STUDENT IN AN ORGANIZED HEALTH CARE EDUCATION/TRAINING PROGRAM

## 2022-05-12 RX ORDER — OLMESARTAN MEDOXOMIL 5 MG/1
5 TABLET ORAL 2 TIMES DAILY
Qty: 60 TABLET | Refills: 3 | Status: SHIPPED | OUTPATIENT
Start: 2022-05-12 | End: 2022-09-19

## 2022-05-12 NOTE — ASSESSMENT & PLAN NOTE
Untreated at this time.   Will restart olmesartan which pt says he tolerated in past. He feels that it works better if taken in low doses BID. Okay to proceed with 5mg BID  RTC 2 weeks for nurse visit for BP check

## 2022-05-12 NOTE — PROGRESS NOTES
Transitional Care Note  Subjective:       Patient ID: Eb Alfaro is a 51 y.o. male.  Chief Complaint: tcm and s/p parathyroidectomy    Family and/or Caretaker present at visit?  No.  Diagnostic tests reviewed/disposition: No diagnosic tests pending after this hospitalization.  Disease/illness education: Discussed importance of having Ca level monitored - followed by surgeon (had labs drawn this AM)  Home health/community services discussion/referrals: Patient does not have home health established from hospital visit.  They do not need home health.  If needed, we will set up home health for the patient.   Establishment or re-establishment of referral orders for community resources: No other necessary community resources.   Discussion with other health care providers: No discussion with other health care providers necessary.     Presents for hospital f/u. Discharged on 5/6/22 following parathyroidectomy. Single parathyroid gland removed based on abnormal sestamibi scan results.  Intraoperative testing consistent with parathyroid hyperplasia vs. adenoma.  Postop course uncomplicated.    He is currently not taking any antihypertensives.  BP elevated today in clinic.    Review of Systems   Constitutional: Negative for chills, fatigue, fever and unexpected weight change.   HENT: Negative for nasal congestion, hearing loss, sinus pressure/congestion and sore throat.    Eyes: Negative for pain, redness and visual disturbance.   Respiratory: Negative for cough, shortness of breath and wheezing.    Cardiovascular: Negative for chest pain, palpitations and leg swelling.   Gastrointestinal: Negative for abdominal pain, change in bowel habit, diarrhea, nausea, vomiting and change in bowel habit.   Endocrine: Negative for cold intolerance, heat intolerance, polydipsia and polyuria.   Genitourinary: Negative for dysuria, frequency, hematuria and urgency.   Musculoskeletal: Negative for arthralgias, joint swelling and  myalgias.   Integumentary:  Negative for color change and rash.   Neurological: Negative for dizziness, syncope, weakness, numbness and headaches.   Hematological: Negative for adenopathy. Does not bruise/bleed easily.   Psychiatric/Behavioral: Negative for confusion. The patient is not nervous/anxious.        Objective:      Physical Exam  Vitals and nursing note reviewed.   Constitutional:       General: He is not in acute distress.     Appearance: He is not ill-appearing.   HENT:      Nose: No congestion or rhinorrhea.      Mouth/Throat:      Mouth: Mucous membranes are moist.      Pharynx: No oropharyngeal exudate or posterior oropharyngeal erythema.   Eyes:      Extraocular Movements: Extraocular movements intact.      Conjunctiva/sclera: Conjunctivae normal.      Pupils: Pupils are equal, round, and reactive to light.   Cardiovascular:      Rate and Rhythm: Normal rate and regular rhythm.      Pulses: Normal pulses.      Heart sounds: No murmur heard.  Pulmonary:      Effort: Pulmonary effort is normal.      Breath sounds: Normal breath sounds.   Abdominal:      Palpations: Abdomen is soft. There is no mass.      Tenderness: There is no abdominal tenderness.   Musculoskeletal:         General: No deformity or signs of injury.      Cervical back: Neck supple.      Right lower leg: No edema.      Left lower leg: No edema.   Lymphadenopathy:      Cervical: No cervical adenopathy.   Skin:     General: Skin is warm and dry.      Findings: No rash.   Neurological:      General: No focal deficit present.      Mental Status: He is alert. Mental status is at baseline.   Psychiatric:         Mood and Affect: Mood normal.         Behavior: Behavior normal.           Assessment and Plan:    1. Hypertension, unspecified type  Overview:  Intolerant of ACE-I and BB in past    Assessment & Plan:  Untreated at this time.   Will restart olmesartan which pt says he tolerated in past. He feels that it works better if taken in low  doses BID. Okay to proceed with 5mg BID  RTC 2 weeks for nurse visit for BP check    Orders:  -     olmesartan (BENICAR) 5 MG Tab    2. Cervical vertebral fusion  Overview:  Posterior approach C2-C6 fusion and lumbar hemilamenectomies on 3/8/22    Assessment & Plan:  Currently on tramadol and gabapentin per neurosurgeon       3. Hyperparathyroidism, primary  Overview:  Saw Endocrine Dr. Moran. Now s/p single parathyroid removal by ENT Dr. Blum.    Assessment & Plan:  ENT Dr. Blum ordered repeat Ca, Vit D, Mg, ionized ca, etc.       4. History of lumbar laminectomy

## 2022-05-12 NOTE — PROGRESS NOTES
[facility] - ENT  Single parathryoidectomy  Following calcium, ionize ca, Mg, and Vit D    [facility] - neck surgery and low back surgery, good results   C2-C6 fusion  Lumbar     Restart benicar 5mg BID  2 weeks nurse visit, bring BP logs    F/u 4 months

## 2022-05-14 NOTE — PROGRESS NOTES
"   Patient:   Eb Alfaro            MRN: [id number]            FIN: [id number]               Age:   51 years     Sex:  Male     :  1970   Associated Diagnoses:   None   Author:   Alvin Harper NP      Chief Complaint   Pre-procedural assessmemt and evaluation      History of Present Illness             The patient presents for Pre-procedural H&P. Neck pain with arm numbness, scheduled for MRI of cervical spine on Monday (2022) .  The patient's general health status is described as good.  The patient's diet is described as balanced and Regular diet.  Exercise:  none, Unable to exercise due to back and neck pain.  Associated symptoms consist of weight gain.  Medical encounters: none.  Compliance problems: none.  Additional pertinent history: none, tobacco use (none, tobacco use: Quit smoking approximately 12 years ago), occasional alcohol use and alcohol use: occaionally drink 8-12 beers on weekends.        Review of Systems   Constitutional:  Negative except as documented in history of present illness.    Eye:  Negative.    Ear/Nose/Mouth/Throat:  Negative.    Respiratory:  Negative.    Cardiovascular:  Negative.    Breast:  Negative.    Gastrointestinal:  Constipation, occasional constipation since back injury (MD aware).    Genitourinary:  Negative.    Hematology/Lymphatics:  Negative.    Endocrine:  Negative.    Immunologic:  Negative.    Musculoskeletal:  Neck pain.         Back pain: Bilaterally, In the lower region, The pain is moderate, Radiating, Numbness in legs and feet; "always feel like I'm walking in mud"..    Integumentary:  Negative.    Neurologic:  Negative except as documented in history of present illness.    Psychiatric:  Claustrophobia.    ROS reviewed as documented in chart      Health Status   Allergies:    Allergic Reactions (Selected)  Severe  Cefdinir- Anaphylactic reaction.  Penicillins- Anaphalaxis.  Severity Not Documented  Morphine- Hives.   Current " medications:  (Selected)   Prescriptions  Prescribed  Flexeril 5 mg oral tablet: 5 mg = 1 tab(s), Oral, TID, X 14 day(s), # 42 tab(s), 4 Refill(s), Pharmacy: [id number], 182.8, cm, Height/Length Dosing, 12/02/21 9:13:00 CST, 104.59, kg, Weight Dosing, 12/02/21 9:13:00 CST  Documented Medications  Documented  Advil 200 mg oral tablet: 400 mg = 2 tab(s), Oral, q4hr, PRN PRN for pain, # 120 tab(s), 0 Refill(s)   Problem list:    All Problems  HTN (hypertension) / SNOMED CT [id number] / Confirmed  Obesity / SNOMED CT 1009226772 / Probable, Spinal stenosis      Histories   Procedure history:    Cholecystectomy; (41994).   Social History        Social & Psychosocial Habits    Alcohol  06/17/2021  Use: Current    Type: Beer    Frequency: 1-2 times per month    Employment/School  06/17/2021  Status: Employed    Highest education: High school    Comment: 10th grade - 06/17/2021 13:19 - Archana Santos LPN    Exercise    Comment: ROM Exercises - 06/17/2021 13:20 - Archana Santos LPN    Home/Environment  06/17/2021  Lives with: Children, Spouse    Living situation: Home/Independent    Nutrition/Health  06/17/2021  Home Diet Regular    Appetite Good    Sexual  06/17/2021  What is your current gender identity? (Check all that apply) Identifies as male    Substance Use  06/17/2021  Use: Never    Tobacco  03/16/2018  Use: Former smoker    Type: Cigarettes    Number of years: 29    02/16/2021  Use: Former smoker, quit more    Patient Wants Consult For Cessation Counseling N/A    Abuse/Neglect  02/16/2021  SHX Any signs of abuse or neglect No    Spiritual/Cultural  06/17/2021  Roman [employer] Preference Alevism      06/17/2021  Branch of  Never in   .        Physical Examination   General:  Alert and oriented, No acute distress.    Eye:  Pupils are equal, round and reactive to light, Normal conjunctiva.    HENT:  Normocephalic, Oral mucosa is moist.    Neck:  Supple, No  lymphadenopathy.    Respiratory:  Lungs are clear to auscultation, Breath sounds are equal.    Cardiovascular:  Normal rate, Regular rhythm.    Gastrointestinal:  Soft, Non-tender.    Genitourinary:  No costovertebral angle tenderness.    Lymphatics:  No lymphadenopathy neck, axilla, groin.    Musculoskeletal:  Normal strength.    Integumentary:  Warm, Dry, Intact.    Neurologic:  Cranial Nerves II-XII are grossly intact.    Cognition and Speech:  Oriented, Speech clear and coherent.    Psychiatric:  Cooperative, Appropriate mood & affect.

## 2022-05-14 NOTE — OP NOTE
Patient:   Eb Alfaro            MRN: [id number]            FIN: [id number]               Age:   51 years     Sex:  Male     :  1970   Associated Diagnoses:   None   Author:   Marcelino Rodriguez MD      Operative Note   DATE OF OPERATION:  2022    PREOPERATIVE DIAGNOSIS:  1.  L4-S1 central and lateral recess stenosis with neurogenic claudication  2.  Right L5-S1 disc herniation with radiculopathy  3.  Cervical spondylosis and stenosis with myelopathy    POSTOPERATIVE DIAGNOSIS:  1.  L4-S1 central and lateral recess stenosis with neurogenic claudication  2.  Right L5-S1 disc herniation with radiculopathy  3.  Cervical spondylosis and stenosis with myelopathy    SURGEON:  Marcelino Rodriguez M.D.   ASSISTANT: MARC Tate    PROCEDURE:    1.  Bilateral L4 hemilaminotomies and bilateral L4-5 medial facetectomies and foraminotomies  2.  Bilateral L5 hemilaminotomies and bilateral L5-S1 medial facetectomies and foraminotomies  3.  Right L5-S1 microdiscectomy  4.  Microdissection for spinal procedure    ANESTHESIA:  General endotracheal    BLOOD LOSS:  [ ] cc    SPECIMEN(s):  None    DRAINS:  MO drain over laminotomy defects    COMPLICATIONS:  None    HISTORY:  Eb Alfaro is a 51-year-old gentleman with a 1 to 2-year history of neck pain with numbness in both hands, but more so on the left than the right.  His hands feel thick and he has noticed some changes in his dexterity.  His balance and gait have worsened as has his low back, hip and especially right leg pain which is more radicular in nature.  Cervical imaging studies showed severe cervical spondylosis and stenosis with cord compression and abnormal signal in the cord.  The compression and stenosis are most significant from C3-C5.  He does have significant degenerative changes both above and below this level.  He also has loss of lordosis with some early reversal, leading to the recommendation for C3-5 laminectomy and C2-C6  posterior instrumentation..   Lumbar imaging showed severe central and lateral recess stenosis at L4-5 and to a lesser degree at L5-S1 with a superimposed right L5-S1 disc herniation.  Options were discussed and, because much of his difficulties centered around his legs and because of his significant right leg pain, I offered to carry out both the cervical and lumbar surgeries under the same anesthetic.  The patient understood and accepted the nature of the surgery as well as its attendant risks.  All his questions were answered.    FINDINGS:  As expected, there was significant central and lateral recess stenosis, most prominent at L4-5.  There is excellent thecal sac decompression at the completion of the procedure.  On the right at L5-S1 there was a significant subligamentous disc herniation with right S1 nerve root impingement.  There was excellent nerve root decompression at the completion of this portion of the procedure also.    PROCEDURE IN DETAIL:  The patient was already endotracheally intubated and had just completed the cervical portion of the procedure.  Attention was now paid to the lumbar area.  The patient received intravenous antibiotics prior to the start of the procedure.  The C-arm was used to guide the placement of the initial incision.    A 2 cm incision was made through the skin, subcutaneous tissues and fascia on the appropriate side after infiltrating the skin and muscle with 1/4% Marcaine containing 1/200,000 epinephrine.  Then progressive dilators were inserted down to the lamina and then a 16-18 mm guide tube was put into place and positioned appropriately according to the C-arm.  Then the operating microscope was brought into place.  Muscle was cleared off of the lateral inferior portion of the lamina and then the high speed drill, curette and Kerrison rongeurs were used to create a hemilaminotomy on the ipsilateral side.  The superior articulating facet of the inferior level was then  undercut and then the ligamentum flavum was excised.  Once complete decompression of the lateral recess and proximal foramen was achieved, then the guide tube was angled medially, the base of the spinolaminar junction was removed with the high-speed drill and a combination of curettes and Kerrison rongeurs were then used to decompress the contralateral lateral recess and foramen. Meticulous hemostasis was achieved with a combination of bipolar cautery and hemostatic agent which was removed at completion. The wound was irrigated copiously with antibiotic irrigating solution.  The same procedure was carried out at L5-S1.  Attention was then paid to the disc herniation which could be seen dorsally displacing the right S1 nerve root.  Nerve root was retracted medially and then the disc space incised sharply.  There was a significant amount of subligamentous disc herniation.  Further degenerated disc material was removed from the posterior lateral portion of the interspace there was excellent nerve root decompression at this point.    The guide tube was removed slowly in a rotating fashion with bipolar cautery of the soft tissues.  A drain was placed over the laminotomy defects and brought out through a separate stab incision. The fascia was closed with 0 Vicryl and the subcutaneous tissue was closed with 2-0 Vicryl in separate layers.  Dermabond skin glue was used for the skin edges in the lumbar area and then staples were applied to the cervical incision. The patient was then taken to the post anesthetic care unit in satisfactory condition with correct sponge and needle counts.

## 2022-05-14 NOTE — OP NOTE
Patient:   Eb Alfaro            MRN: [id number]            FIN: [id number]               Age:   51 years     Sex:  Male     :  1970   Associated Diagnoses:   None   Author:   Marcelino Rodriguez MD      Operative Note   DATE OF OPERATION:  2022    PREOPERATIVE DIAGNOSIS:  1.  Cervical spondylosis and stenosis with myelopathy  2.  Lumbar stenosis with neurogenic claudication  3.  Right L5-S1 disc herniation with radiculopathy    POSTOPERATIVE DIAGNOSIS:  1.  Cervical spondylosis and stenosis with myelopathy  2.  Lumbar stenosis with neurogenic claudication  3.  Right L5-S1 disc herniation with radiculopathy    SURGEON:  Marcelino Rodriguez M.D.   ASSISTANT: CASSANDRA Tate    PROCEDURE:  1.  C3, C4, C5 decompressive laminectomies  2.  Bilateral C2-C6 arthrodesis, posterior facet technique with morcellized local bone autograft  3.  Bilateral C2-3-4-5-6 posterior segmental instrumentation with SpineWave Proficient posterior cervical instrumentation  4.  Stereotactic neuronavigation with the O-arm  5.  Preparation of morselized local bone autograft    ANESTHESIA:  General endotracheal    BLOOD LOSS:  325 cc (for both procedures)    SPECIMEN(s):  None    DRAIN:  MO in subfascial space    COMPLICATIONS:  None    HISTORY:  Eb Alfaro is a 51-year-old gentleman with a 1 to 2-year history of neck pain with numbness in both hands, but more so on the left than the right.  His hands feel thick and he has noticed some changes in his dexterity.  His balance and gait have worsened as has his low back, hip and especially right leg pain which is more radicular in nature.  Cervical imaging studies showed severe cervical spondylosis and stenosis with cord compression and abnormal signal in the cord.  The compression and stenosis are most significant from C3-C5.  He does have significant degenerative changes both above and below this level.  He also has loss of lordosis with some early reversal, leading to  the recommendation for C3-5 laminectomy and C2-C6 posterior instrumentation..   Lumbar imaging showed severe central and lateral recess stenosis at L4-5 and to a lesser degree at L5-S1 with a superimposed right L5-S1 disc herniation.  Options were discussed and, because much of his difficulties centered around his legs and because of his significant right leg pain, I offered to carry out both the cervical and lumbar surgeries under the same anesthetic.  The patient understood and accepted the nature of the surgery as well as its attendant risks.  All his questions were answered.    FINDINGS:  As expected, there was marked ballooning of the thecal sac after the decompression.  This was widened.  There were nice pulsations of the thecal sac at this point.  Instrumentation was carried out using the O-arm without incident with excellent screw placement and solid fixation at the completion of the procedure.  Interfacet morcellized local bone autograft fragments were placed at the appropriate levels. A drain was placed in the subfascial space and brought out through a separate stab incision. Stimulan antibiotic impregnated beads were used.  The patient tolerated the procedure well.    PROCEDURE IN DETAIL:  After endotracheal intubation and induction of general anesthesia, the patient's head was placed into the Vidal 3 point pin fixation head rest.  Intraoperative neuro monitoring electrodes into place for EMG, SSEP and MEP monitoring to be carried out continuously throughout the procedure.  The head was placed in the Vidal three-point pin fixation head rest and the patient was placed into the prone position.  The reference frame for the navigation was affixed to the Vidal apparatus.  The C-arm used to confirm satisfactory alignment.  The patient received intravenous antibiotics prior to the start of the procedure.  The posterior cervical and suboccipital areas as well as the lumbar region were then shaved,  prepped and draped in the usual fashion.  A midline incision was marked out and infiltrated with local anesthetic containing epinephrine.  Incision was carried down through the skin and subcutaneous tissues with a knife.  Unipolar cautery was used to incise through the ligamentum nuchae and cervicodorsal fascia in the avascular plane and then the paraspinal musculature and suboccipital musculature when appropriate were then elevated off of spinous processes and jhon-laminae out over the facets.  A spin with the O arm was then carried out.   holes were then marked out using the navigated drill and then O-arm was used to guide placement of lateral mass screws from C3-C6 although we ended up leaving out the left C5 screw.  Superior and medially directed pedicle screws were placed at C2.  Self-retaining retractors were put into place.  Decompressive laminectomies from C3-C5 were then carried out using a combination of the high-speed drill and rongeurs. A smith template was used to help contour the smith.  The smith was affixed to the pedicle and lateral mass screws with set screws and these were all counter-torqued.  A cross-link was attached.  Corticocancellous allograft was then packed into the gutters after decorticating the facet joints at the appropriate levels.  Morcellized local bone autograft fragments were placed into the facet joints.    The wound was irrigated copiously with antibiotic irrigation.  A drain was placed in the subfascial space and brought out through separate stab incision.  Stimulan antibiotic impregnated beads were placed into the wound prior to closure.  The wound was then closed with 0 Vicryl for the fascia, 2-0 Vicryl for the subcutaneous tissue and, later, staples for the skin edges.  Attention was then paid to the lumbar surgery which is dictated under a separate report.

## 2022-05-14 NOTE — H&P
Patient:   Eb Alfaro            MRN: [id number]            FIN: [id number]               Age:   51 years     Sex:  Male     :  1970   Associated Diagnoses:   None   Author:   Tyrese SPEARS, Davida Herrera      Health Status   The H&P was reviewed, the patient was examined, and the following changes to the patient's condition are noted: Discussed calcium/hyperparathyroidism with Dr. Orville Monzon last Friday.  Due to his myelopathy/cord compression, he felt agreed further work-up should not impede surgical intervention.

## 2022-05-15 NOTE — ANESTHESIA POSTPROCEDURE EVALUATION
Anesthesia Post Evaluation    Patient: Eb Alfaro    Procedure(s) Performed: Procedure(s) (LRB):  PARATHYROIDECTOMY (N/A)    Final Anesthesia Type: general      Patient location during evaluation: PACU  Patient participation: Yes- Able to Participate  Level of consciousness: awake and alert  Post-procedure vital signs: reviewed and stable  Pain management: adequate  Airway patency: patent  MADHU mitigation strategies: Multimodal analgesia    Anesthetic complications: no      Cardiovascular status: stable  Respiratory status: unassisted  Hydration status: euvolemic  Follow-up not needed.          Vitals Value Taken Time   /90 05/12/22 1151   Temp 37.1 °C (98.8 °F) 05/07/22 1100   Pulse 68 05/12/22 1121   Resp 18 05/12/22 1121   SpO2 98 % 05/12/22 1121         Event Time   Out of Recovery 20:32:00         Pain/Tommy Score: No data recorded

## 2022-06-07 LAB
ESTROGEN SERPL-MCNC: NORMAL PG/ML
INSULIN SERPL-ACNC: NORMAL U[IU]/ML
LAB AP CLINICAL INFORMATION: NORMAL
LAB AP GROSS DESCRIPTION: NORMAL
LAB AP INTRA OP: NORMAL
LAB AP REPORT FOOTNOTES: NORMAL
T3RU NFR SERPL: NORMAL %

## 2022-06-09 RX ORDER — OXYCODONE AND ACETAMINOPHEN 10; 325 MG/1; MG/1
TABLET ORAL
COMMUNITY
Start: 2022-03-20 | End: 2022-12-06

## 2022-06-09 RX ORDER — IBUPROFEN 200 MG
400 TABLET ORAL
COMMUNITY
Start: 2021-06-17 | End: 2023-01-12

## 2022-06-09 RX ORDER — METOPROLOL SUCCINATE 25 MG/1
TABLET, EXTENDED RELEASE ORAL
COMMUNITY
Start: 2022-03-22 | End: 2022-12-06

## 2022-06-09 RX ORDER — PSEUDOEPHEDRINE HCL 30 MG
60 TABLET ORAL
COMMUNITY
Start: 2022-03-11 | End: 2022-12-06

## 2022-06-09 RX ORDER — METHOCARBAMOL 500 MG/1
TABLET, FILM COATED ORAL
COMMUNITY
Start: 2022-04-28 | End: 2022-12-06

## 2022-06-09 RX ORDER — LISINOPRIL 10 MG/1
10 TABLET ORAL DAILY
COMMUNITY
Start: 2022-03-17 | End: 2022-09-19

## 2022-06-09 RX ORDER — LORAZEPAM 1 MG/1
1 TABLET ORAL
COMMUNITY
Start: 2022-03-20 | End: 2022-12-06

## 2022-06-09 RX ORDER — HYDROCODONE BITARTRATE AND ACETAMINOPHEN 5; 325 MG/1; MG/1
1 TABLET ORAL 3 TIMES DAILY PRN
COMMUNITY
Start: 2022-03-07 | End: 2022-12-06

## 2022-06-09 RX ORDER — CYCLOBENZAPRINE HCL 10 MG
TABLET ORAL
COMMUNITY
Start: 2022-04-17 | End: 2023-08-15

## 2022-06-09 RX ORDER — AMLODIPINE BESYLATE 10 MG/1
10 TABLET ORAL
COMMUNITY
Start: 2022-02-24 | End: 2022-12-06

## 2022-06-10 DIAGNOSIS — G99.2 STENOSIS OF CERVICAL SPINE WITH MYELOPATHY: Primary | ICD-10-CM

## 2022-06-10 DIAGNOSIS — M48.02 STENOSIS OF CERVICAL SPINE WITH MYELOPATHY: Primary | ICD-10-CM

## 2022-06-13 ENCOUNTER — OFFICE VISIT (OUTPATIENT)
Dept: NEUROSURGERY | Facility: CLINIC | Age: 52
End: 2022-06-13
Payer: COMMERCIAL

## 2022-06-13 ENCOUNTER — HOSPITAL ENCOUNTER (OUTPATIENT)
Dept: RADIOLOGY | Facility: HOSPITAL | Age: 52
Discharge: HOME | End: 2022-06-13
Attending: NURSE PRACTITIONER
Payer: COMMERCIAL

## 2022-06-13 VITALS
RESPIRATION RATE: 16 BRPM | BODY MASS INDEX: 31.02 KG/M2 | SYSTOLIC BLOOD PRESSURE: 137 MMHG | HEART RATE: 74 BPM | WEIGHT: 229 LBS | DIASTOLIC BLOOD PRESSURE: 91 MMHG | HEIGHT: 72 IN

## 2022-06-13 DIAGNOSIS — M51.16 DISPLACEMENT OF LUMBAR DISC WITH RADICULOPATHY: ICD-10-CM

## 2022-06-13 DIAGNOSIS — G99.2 STENOSIS OF CERVICAL SPINE WITH MYELOPATHY: ICD-10-CM

## 2022-06-13 DIAGNOSIS — M48.02 STENOSIS OF CERVICAL SPINE WITH MYELOPATHY: ICD-10-CM

## 2022-06-13 DIAGNOSIS — M48.062 LUMBAR STENOSIS WITH NEUROGENIC CLAUDICATION: ICD-10-CM

## 2022-06-13 PROCEDURE — 3008F PR BODY MASS INDEX (BMI) DOCUMENTED: ICD-10-PCS | Mod: CPTII,,, | Performed by: NURSE PRACTITIONER

## 2022-06-13 PROCEDURE — 1159F PR MEDICATION LIST DOCUMENTED IN MEDICAL RECORD: ICD-10-PCS | Mod: CPTII,,, | Performed by: NURSE PRACTITIONER

## 2022-06-13 PROCEDURE — 1159F MED LIST DOCD IN RCRD: CPT | Mod: CPTII,,, | Performed by: NURSE PRACTITIONER

## 2022-06-13 PROCEDURE — 3080F PR MOST RECENT DIASTOLIC BLOOD PRESSURE >= 90 MM HG: ICD-10-PCS | Mod: CPTII,,, | Performed by: NURSE PRACTITIONER

## 2022-06-13 PROCEDURE — 3075F PR MOST RECENT SYSTOLIC BLOOD PRESS GE 130-139MM HG: ICD-10-PCS | Mod: CPTII,,, | Performed by: NURSE PRACTITIONER

## 2022-06-13 PROCEDURE — 3075F SYST BP GE 130 - 139MM HG: CPT | Mod: CPTII,,, | Performed by: NURSE PRACTITIONER

## 2022-06-13 PROCEDURE — 3008F BODY MASS INDEX DOCD: CPT | Mod: CPTII,,, | Performed by: NURSE PRACTITIONER

## 2022-06-13 PROCEDURE — 99213 OFFICE O/P EST LOW 20 MIN: CPT | Mod: ,,, | Performed by: NURSE PRACTITIONER

## 2022-06-13 PROCEDURE — 72040 X-RAY EXAM NECK SPINE 2-3 VW: CPT | Mod: TC

## 2022-06-13 PROCEDURE — 99213 PR OFFICE/OUTPT VISIT, EST, LEVL III, 20-29 MIN: ICD-10-PCS | Mod: ,,, | Performed by: NURSE PRACTITIONER

## 2022-06-13 PROCEDURE — 3080F DIAST BP >= 90 MM HG: CPT | Mod: CPTII,,, | Performed by: NURSE PRACTITIONER

## 2022-06-13 PROCEDURE — 4010F PR ACE/ARB THEARPY RXD/TAKEN: ICD-10-PCS | Mod: CPTII,,, | Performed by: NURSE PRACTITIONER

## 2022-06-13 PROCEDURE — 4010F ACE/ARB THERAPY RXD/TAKEN: CPT | Mod: CPTII,,, | Performed by: NURSE PRACTITIONER

## 2022-06-13 RX ORDER — ORPHENADRINE CITRATE 100 MG/1
100 TABLET, EXTENDED RELEASE ORAL 2 TIMES DAILY
Qty: 60 TABLET | Refills: 2 | Status: SHIPPED | OUTPATIENT
Start: 2022-06-13 | End: 2022-08-04 | Stop reason: SDUPTHER

## 2022-06-13 RX ORDER — GABAPENTIN 300 MG/1
300 CAPSULE ORAL 3 TIMES DAILY
Qty: 90 CAPSULE | Refills: 5 | Status: ON HOLD | OUTPATIENT
Start: 2022-06-13 | End: 2023-03-23 | Stop reason: SDUPTHER

## 2022-06-13 RX ORDER — ACETAMINOPHEN 500 MG
TABLET ORAL DAILY
COMMUNITY
End: 2023-08-15

## 2022-06-13 RX ORDER — METHYLPREDNISOLONE 4 MG/1
TABLET ORAL
Qty: 21 EACH | Refills: 0 | Status: SHIPPED | OUTPATIENT
Start: 2022-06-13 | End: 2022-07-04

## 2022-06-13 NOTE — PROGRESS NOTES
"[facility]              Post-Operative Visit      HPI:  Eb Alfaro is a 51 y.o.-year-old male who presents today for post-operative follow-up.  The patient is s/p C3-5 laminectomies, bilateral C2-C6 posterior instrumentation; bilateral L4-5, L5-S1 decompression; right L5-S1 microdiskectomy on 03/08/2022.  He did well postoperatively with resolution of his preoperative arm symptoms.  He continued with very mild posterior right thigh pain, otherwise his lower extremity symptoms were resolved.  He called the office and early April due to some back and anterior thigh pain.  This improved with gabapentin.  At the end of April, he began with some spasms in the right neck and occipital area.  He was started on Norflex.  He presents today for his 3 month postop appointment.    He reports minimal neck pain. He denies any pain, numbness or tingling in arms/hands. He reports constant lower back discomfort (2-3/10). When standing still, he has burning in bilateral anterior thighs. He has "electrical" sensations in the lower back.  The thigh burning began about 3 weeks ago.     Patient Active Problem List    Diagnosis Date Noted    Stenosis of cervical spine with myelopathy 06/13/2022    Lumbar stenosis with neurogenic claudication 06/13/2022    Displacement of lumbar disc with radiculopathy 06/13/2022    Primary hypertension 05/12/2022    Cervical vertebral fusion 05/12/2022    Hyperparathyroidism, primary 05/07/2022     Past Medical History:   Diagnosis Date    Digestive disorder     Hypertension     Thyroid disease      Past Surgical History:   Procedure Laterality Date    Bilateral L4-5, L5-S1 decompression; right L5-S1 microdiscectomy  03/08/2022    Dr. Rodriguez    C3-5 laminectomies, C2-C6 posterior instrumented fusion  03/08/2022    Dr. Rodriguez    CHOLECYSTECTOMY      PARATHYROIDECTOMY N/A 05/06/2022    Procedure: PARATHYROIDECTOMY;  Surgeon: Ajay Blum MD;  Location: " [facility] OR;  Service: ENT;  Laterality: N/A;  PARATHYROIDECTOMY // NERVE MONITOR // FROZEN SECTION // INTRA OP PTH // SUPINE     (Not in a hospital admission)    Review of patient's allergies indicates:   Allergen Reactions    Cefdinir Anaphylaxis    Penicillins Anaphylaxis    Hydrocodone-acetaminophen Itching    Morphine Hives    Opioids - morphine analogues Hives     Social History     Tobacco Use    Smoking status: Former Smoker    Smokeless tobacco: Never Used   Substance Use Topics    Alcohol use: Yes     Alcohol/week: 2.0 standard drinks     Types: 2 Standard drinks or equivalent per week     No family history on file.    PE:  General: well developed, well nourished, no distress.   Head: normocephalic, atraumatic  Neurologic: Alert and oriented. Thought content appropriate.  GCS: Motor: 6/Verbal: 5/Eyes: 4 GCS Total: 15  Mental Status: Awake, Alert, Oriented x3  Cranial nerves: face symmetric, tongue midline, CN II-XII grossly intact.   Eyes: pupils equal, round, reactive to light with accomodation, EOMI.   Sensory: intact to light touch throughout  Motor Strength:Moves all extremities spontaneously with good tone.  Full strength upper and lower extremities. No abnormal movements seen.       Iliopsoas Quadriceps Knee  Flexion Tibialis  anterior Gastro- cnemius EHL   Lower: R 5/5 5/5 5/5 5/5 5/5 5/5    L 5/5 5/5 5/5 5/5 5/5 5/5     DTR's - 2 + and symmetric in UE and LE  Straight leg raise: negative  Gait: normal    Incision: well-healed    IMAGING:    XRays: cervical XRs from today reveal satisfactory position of hardware and stable alignment.     ASSESSMENT/PLAN:   Problem List Items Addressed This Visit        Neuro    Stenosis of cervical spine with myelopathy    Lumbar stenosis with neurogenic claudication    Displacement of lumbar disc with radiculopathy         Patient will increase his gabapentin as tolerated to TID. MDP was prescribed. He will be scheduled to f/u at 6 months post-op from his  cervical surgery. He will call if his lumbar symptoms progress so that updated imaging can be obtained prior to his next f/u appointment.

## 2022-08-04 ENCOUNTER — TELEPHONE (OUTPATIENT)
Dept: NEUROSURGERY | Facility: CLINIC | Age: 52
End: 2022-08-04
Payer: COMMERCIAL

## 2022-08-04 DIAGNOSIS — G99.2 STENOSIS OF CERVICAL SPINE WITH MYELOPATHY: Primary | ICD-10-CM

## 2022-08-04 DIAGNOSIS — M48.02 STENOSIS OF CERVICAL SPINE WITH MYELOPATHY: Primary | ICD-10-CM

## 2022-08-04 RX ORDER — ORPHENADRINE CITRATE 100 MG/1
100 TABLET, EXTENDED RELEASE ORAL 2 TIMES DAILY
Qty: 60 TABLET | Refills: 2 | Status: SHIPPED | OUTPATIENT
Start: 2022-08-04 | End: 2023-05-01 | Stop reason: SDUPTHER

## 2022-09-16 ENCOUNTER — HISTORICAL (OUTPATIENT)
Dept: ADMINISTRATIVE | Facility: HOSPITAL | Age: 52
End: 2022-09-16
Payer: COMMERCIAL

## 2022-09-19 ENCOUNTER — CLINICAL SUPPORT (OUTPATIENT)
Dept: PRIMARY CARE CLINIC | Facility: CLINIC | Age: 52
End: 2022-09-19
Payer: COMMERCIAL

## 2022-09-19 DIAGNOSIS — I10 HYPERTENSION, UNSPECIFIED TYPE: Primary | ICD-10-CM

## 2022-09-19 NOTE — PROGRESS NOTES
/  Patient present for blood pressure check.  132/83- auto  130/80- manual  Follow up appt 11/19/2021.  Will labs be need last 3/9/2022-for this provider  Other results from specialty dr in system  dated 5/7/2022

## 2022-09-26 ENCOUNTER — HOSPITAL ENCOUNTER (OUTPATIENT)
Dept: RADIOLOGY | Facility: HOSPITAL | Age: 52
Discharge: HOME | End: 2022-09-26
Attending: NURSE PRACTITIONER
Payer: COMMERCIAL

## 2022-09-26 ENCOUNTER — OFFICE VISIT (OUTPATIENT)
Dept: NEUROSURGERY | Facility: CLINIC | Age: 52
End: 2022-09-26
Payer: COMMERCIAL

## 2022-09-26 VITALS
BODY MASS INDEX: 32.23 KG/M2 | DIASTOLIC BLOOD PRESSURE: 71 MMHG | HEIGHT: 72 IN | SYSTOLIC BLOOD PRESSURE: 116 MMHG | HEART RATE: 86 BPM | RESPIRATION RATE: 16 BRPM | WEIGHT: 238 LBS

## 2022-09-26 DIAGNOSIS — G99.2 STENOSIS OF CERVICAL SPINE WITH MYELOPATHY: ICD-10-CM

## 2022-09-26 DIAGNOSIS — M48.062 LUMBAR STENOSIS WITH NEUROGENIC CLAUDICATION: ICD-10-CM

## 2022-09-26 DIAGNOSIS — M48.02 STENOSIS OF CERVICAL SPINE WITH MYELOPATHY: Primary | ICD-10-CM

## 2022-09-26 DIAGNOSIS — M48.02 STENOSIS OF CERVICAL SPINE WITH MYELOPATHY: ICD-10-CM

## 2022-09-26 DIAGNOSIS — G99.2 STENOSIS OF CERVICAL SPINE WITH MYELOPATHY: Primary | ICD-10-CM

## 2022-09-26 PROCEDURE — 4010F PR ACE/ARB THEARPY RXD/TAKEN: ICD-10-PCS | Mod: CPTII,,, | Performed by: NEUROLOGICAL SURGERY

## 2022-09-26 PROCEDURE — 1159F MED LIST DOCD IN RCRD: CPT | Mod: CPTII,,, | Performed by: NEUROLOGICAL SURGERY

## 2022-09-26 PROCEDURE — 72040 X-RAY EXAM NECK SPINE 2-3 VW: CPT | Mod: TC

## 2022-09-26 PROCEDURE — 1159F PR MEDICATION LIST DOCUMENTED IN MEDICAL RECORD: ICD-10-PCS | Mod: CPTII,,, | Performed by: NEUROLOGICAL SURGERY

## 2022-09-26 PROCEDURE — 99212 OFFICE O/P EST SF 10 MIN: CPT | Mod: ,,, | Performed by: NEUROLOGICAL SURGERY

## 2022-09-26 PROCEDURE — 1160F RVW MEDS BY RX/DR IN RCRD: CPT | Mod: CPTII,,, | Performed by: NEUROLOGICAL SURGERY

## 2022-09-26 PROCEDURE — 1160F PR REVIEW ALL MEDS BY PRESCRIBER/CLIN PHARMACIST DOCUMENTED: ICD-10-PCS | Mod: CPTII,,, | Performed by: NEUROLOGICAL SURGERY

## 2022-09-26 PROCEDURE — 3074F PR MOST RECENT SYSTOLIC BLOOD PRESSURE < 130 MM HG: ICD-10-PCS | Mod: CPTII,,, | Performed by: NEUROLOGICAL SURGERY

## 2022-09-26 PROCEDURE — 99212 PR OFFICE/OUTPT VISIT, EST, LEVL II, 10-19 MIN: ICD-10-PCS | Mod: ,,, | Performed by: NEUROLOGICAL SURGERY

## 2022-09-26 PROCEDURE — 3078F DIAST BP <80 MM HG: CPT | Mod: CPTII,,, | Performed by: NEUROLOGICAL SURGERY

## 2022-09-26 PROCEDURE — 3078F PR MOST RECENT DIASTOLIC BLOOD PRESSURE < 80 MM HG: ICD-10-PCS | Mod: CPTII,,, | Performed by: NEUROLOGICAL SURGERY

## 2022-09-26 PROCEDURE — 4010F ACE/ARB THERAPY RXD/TAKEN: CPT | Mod: CPTII,,, | Performed by: NEUROLOGICAL SURGERY

## 2022-09-26 PROCEDURE — 3074F SYST BP LT 130 MM HG: CPT | Mod: CPTII,,, | Performed by: NEUROLOGICAL SURGERY

## 2022-09-26 NOTE — PROGRESS NOTES
[facility]  Neurosurgery        Eb Alfaro   [id number]   1970         CHIEF COMPLAINT:    6 months post-op    HPI:    Eb Alfaro is a 51 y.o.-year-old male who presents today for post-operative follow-up.  He is s/p  C3-5 laminectomies, bilateral C2-C6 posterior instrumentation; bilateral L4-5, L5-S1 decompression, and right L5-S1 microdiskectomy that was done on 3/8/22.  He reports much improvement in preoperative symptoms.  He continues with a pins/needles sensation in the upper scapula/shoulder area.  The pain he experienced in this area after surgery has resolved.  He reports minimal neck pain.  He denies any pain or numbness in the upper extremities.  He has burning and hypersensitivity in the right lateral thigh that is new since his last visit here.  He notices the thigh looks swollen at times.  He notices the burning mostly when standing for long periods.  It resolves after he sits and rests for a few minutes.  He has an electrical type sensation in the lower back, but no longer has much pain.  He continues to take Gabapentin 300mg twice a day.         Review of patient's allergies indicates:   Allergen Reactions    Cefdinir Anaphylaxis    Penicillins Anaphylaxis    Hydrocodone-acetaminophen Itching    Morphine Hives    Opioids - morphine analogues Hives       Current Outpatient Medications   Medication Sig Dispense Refill    cyclobenzaprine (FLEXERIL) 10 MG tablet TAKE 1 TABLET BY MOUTH THREE TIMES DAILY AS NEEDED FOR SPASM      gabapentin (NEURONTIN) 300 MG capsule Take 1 capsule (300 mg total) by mouth 3 (three) times daily. 90 capsule 5    olmesartan (BENICAR) 5 MG Tab TAKE 1 TABLET(5 MG) BY MOUTH TWICE DAILY 60 tablet 3    orphenadrine (NORFLEX) 100 mg tablet Take 1 tablet (100 mg total) by mouth 2 (two) times daily. 60 tablet 2    amLODIPine (NORVASC) 10 MG tablet Take 10 mg by mouth.      cholecalciferol, vitamin D3, (VITAMIN D3) 50 mcg (2,000 unit) Cap capsule Take  by mouth once daily.      HYDROcodone-acetaminophen (NORCO) 5-325 mg per tablet Take 1 tablet by mouth 3 (three) times daily as needed.      ibuprofen (ADVIL,MOTRIN) 200 MG tablet Take 400 mg by mouth.      LORazepam (ATIVAN) 1 MG tablet Take 1 mg by mouth.      methocarbamoL (ROBAXIN) 500 MG Tab TAKE 1 TO 2 TABLETS BY MOUTH THREE TIMES DAILY AS NEEDED FOR MUSCLE SPASMS      metoprolol succinate (TOPROL-XL) 25 MG 24 hr tablet TAKE 1 TABLET BY MOUTH DAILY. DO NOT CRUSH OR CHEW      oxyCODONE-acetaminophen (PERCOCET)  mg per tablet Take by mouth.      pseudoephedrine (SUDAFED) 30 MG tablet Take 60 mg by mouth.      sodium chloride (OCEAN) 0.65 % nasal spray by Nasal route.      traMADoL (ULTRAM) 50 mg tablet Take 1 tablet (50 mg total) by mouth every 6 (six) hours as needed. (Patient not taking: No sig reported) 24 tablet 0     No current facility-administered medications for this visit.       Past Medical History:   Diagnosis Date    Digestive disorder     Hypertension     Thyroid disease      Past Surgical History:   Procedure Laterality Date    APPENDECTOMY      Bilateral L4-5, L5-S1 decompression; right L5-S1 microdiscectomy  03/08/2022    Dr. Rodriguez    C3-5 laminectomies, C2-C6 posterior instrumented fusion  03/08/2022    Dr. Rodriguez    CHOLECYSTECTOMY      colonocscopy      LEFT HEART CATHETERIZATION  10/16/2017    PARATHYROIDECTOMY N/A 05/06/2022    Procedure: PARATHYROIDECTOMY;  Surgeon: Ajay Blum MD;  Location: [facility];  Service: ENT;  Laterality: N/A;  PARATHYROIDECTOMY // NERVE MONITOR // FROZEN SECTION // INTRA OP PTH // SUPINE     Family History       Problem Relation (Age of Onset)    Cancer Mother, Father          Social History     Socioeconomic History    Marital status:    Tobacco Use    Smoking status: Former    Smokeless tobacco: Never   Substance and Sexual Activity    Alcohol use: Yes     Alcohol/week: 2.0 standard drinks     Types: 2 Standard drinks or equivalent per week     Drug use: No       Review of systems:    Pertinent items are noted in HPI.      Vital Signs  Pulse: 86  Resp: 16  BP: 116/71  Pain Score:   2  Height: 6' (182.9 cm)  Weight: 108 kg (238 lb)  Body mass index is 32.28 kg/m².      Physical Exam:    General:  Pleasant. Well-nourished. Alert. No acute distress.    Head:  Normocephalic, without obvious abnormality, atraumatic    Lungs:   Breathing is quiet, non-lablored    Neurological:    Oriented to Person, Place, Time   Speech:  normal  Memory, cognition, and affect are appropriate.  Motor Strength: Moves all extremities spontaneously with good tone.  No abnormal movements seen.  normal 5/5 strength in all tested muscle groups and no muscle wasting or atrophy      Cervical and Lumbar incision:  Well healed    Gait:  normal, able to walk on heels and toes without difficulty    Six-month postop x-rays look great.  There is satisfactory position of the hardware with stable alignment.    ASSESSMENT:     1. Stenosis of cervical spine with myelopathy    2. Lumbar stenosis with neurogenic claudication     - Instructed on gradually increasing Neurontin as tolerated, not to exceed 3600mg/day.  - Follow up 1 year post op with cervical x-rays            I, Dr. Marcelino Rodriguez, personally performed the services described in this documentation. All medical record entries made by the scribe, Karen Hanks RN, were at my direction and in my presence.  I have reviewed the chart and agree that the record reflects my personal performance and is accurate and complete. Marcelino Rodriguez MD.  1:12 PM 09/26/2022           Marcelino Rodriguez MD FACS [facility]

## 2022-12-06 ENCOUNTER — OFFICE VISIT (OUTPATIENT)
Dept: PRIMARY CARE CLINIC | Facility: CLINIC | Age: 52
End: 2022-12-06
Payer: COMMERCIAL

## 2022-12-06 ENCOUNTER — DOCUMENTATION ONLY (OUTPATIENT)
Dept: PRIMARY CARE CLINIC | Facility: CLINIC | Age: 52
End: 2022-12-06

## 2022-12-06 VITALS
HEIGHT: 72 IN | BODY MASS INDEX: 31.15 KG/M2 | WEIGHT: 230 LBS | TEMPERATURE: 98 F | OXYGEN SATURATION: 98 % | DIASTOLIC BLOOD PRESSURE: 78 MMHG | RESPIRATION RATE: 20 BRPM | HEART RATE: 76 BPM | SYSTOLIC BLOOD PRESSURE: 135 MMHG

## 2022-12-06 DIAGNOSIS — Z00.00 WELLNESS EXAMINATION: ICD-10-CM

## 2022-12-06 DIAGNOSIS — I10 PRIMARY HYPERTENSION: ICD-10-CM

## 2022-12-06 DIAGNOSIS — Z87.891 FORMER SMOKER: ICD-10-CM

## 2022-12-06 DIAGNOSIS — Z98.890 H/O PARATHYROIDECTOMY: ICD-10-CM

## 2022-12-06 DIAGNOSIS — R39.15 URINARY URGENCY: Primary | ICD-10-CM

## 2022-12-06 DIAGNOSIS — Z12.5 SCREENING FOR MALIGNANT NEOPLASM OF PROSTATE: ICD-10-CM

## 2022-12-06 DIAGNOSIS — Z90.89 H/O PARATHYROIDECTOMY: ICD-10-CM

## 2022-12-06 DIAGNOSIS — Z23 NEED FOR DIPHTHERIA-TETANUS-PERTUSSIS (TDAP) VACCINE: ICD-10-CM

## 2022-12-06 DIAGNOSIS — R31.29 MICROSCOPIC HEMATURIA: ICD-10-CM

## 2022-12-06 DIAGNOSIS — E55.9 VITAMIN D DEFICIENCY: ICD-10-CM

## 2022-12-06 DIAGNOSIS — F17.210 SMOKING GREATER THAN 20 PACK YEARS: ICD-10-CM

## 2022-12-06 LAB
BILIRUB SERPL-MCNC: NEGATIVE MG/DL
BLOOD URINE, POC: NORMAL
CLARITY, POC UA: CLEAR
COLOR, POC UA: NORMAL
GLUCOSE UR QL STRIP: NEGATIVE
KETONES UR QL STRIP: NEGATIVE
LEUKOCYTE ESTERASE URINE, POC: NEGATIVE
NITRITE, POC UA: NEGATIVE
PH, POC UA: 6
PROTEIN, POC: NEGATIVE
SPECIFIC GRAVITY, POC UA: 1.03
UROBILINOGEN, POC UA: 0.2

## 2022-12-06 PROCEDURE — 1159F MED LIST DOCD IN RCRD: CPT | Mod: CPTII,,, | Performed by: STUDENT IN AN ORGANIZED HEALTH CARE EDUCATION/TRAINING PROGRAM

## 2022-12-06 PROCEDURE — 90471 IMMUNIZATION ADMIN: CPT | Mod: ,,, | Performed by: STUDENT IN AN ORGANIZED HEALTH CARE EDUCATION/TRAINING PROGRAM

## 2022-12-06 PROCEDURE — 3008F BODY MASS INDEX DOCD: CPT | Mod: CPTII,,, | Performed by: STUDENT IN AN ORGANIZED HEALTH CARE EDUCATION/TRAINING PROGRAM

## 2022-12-06 PROCEDURE — 3078F PR MOST RECENT DIASTOLIC BLOOD PRESSURE < 80 MM HG: ICD-10-PCS | Mod: CPTII,,, | Performed by: STUDENT IN AN ORGANIZED HEALTH CARE EDUCATION/TRAINING PROGRAM

## 2022-12-06 PROCEDURE — 90715 TDAP VACCINE GREATER THAN OR EQUAL TO 7YO IM: ICD-10-PCS | Mod: ,,, | Performed by: STUDENT IN AN ORGANIZED HEALTH CARE EDUCATION/TRAINING PROGRAM

## 2022-12-06 PROCEDURE — 99396 PR PREVENTIVE VISIT,EST,40-64: ICD-10-PCS | Mod: 25,,, | Performed by: STUDENT IN AN ORGANIZED HEALTH CARE EDUCATION/TRAINING PROGRAM

## 2022-12-06 PROCEDURE — 3075F SYST BP GE 130 - 139MM HG: CPT | Mod: CPTII,,, | Performed by: STUDENT IN AN ORGANIZED HEALTH CARE EDUCATION/TRAINING PROGRAM

## 2022-12-06 PROCEDURE — 4010F PR ACE/ARB THEARPY RXD/TAKEN: ICD-10-PCS | Mod: CPTII,,, | Performed by: STUDENT IN AN ORGANIZED HEALTH CARE EDUCATION/TRAINING PROGRAM

## 2022-12-06 PROCEDURE — 3075F PR MOST RECENT SYSTOLIC BLOOD PRESS GE 130-139MM HG: ICD-10-PCS | Mod: CPTII,,, | Performed by: STUDENT IN AN ORGANIZED HEALTH CARE EDUCATION/TRAINING PROGRAM

## 2022-12-06 PROCEDURE — 81002 URINALYSIS NONAUTO W/O SCOPE: CPT | Mod: ,,, | Performed by: STUDENT IN AN ORGANIZED HEALTH CARE EDUCATION/TRAINING PROGRAM

## 2022-12-06 PROCEDURE — 1160F RVW MEDS BY RX/DR IN RCRD: CPT | Mod: CPTII,,, | Performed by: STUDENT IN AN ORGANIZED HEALTH CARE EDUCATION/TRAINING PROGRAM

## 2022-12-06 PROCEDURE — 1160F PR REVIEW ALL MEDS BY PRESCRIBER/CLIN PHARMACIST DOCUMENTED: ICD-10-PCS | Mod: CPTII,,, | Performed by: STUDENT IN AN ORGANIZED HEALTH CARE EDUCATION/TRAINING PROGRAM

## 2022-12-06 PROCEDURE — 4010F ACE/ARB THERAPY RXD/TAKEN: CPT | Mod: CPTII,,, | Performed by: STUDENT IN AN ORGANIZED HEALTH CARE EDUCATION/TRAINING PROGRAM

## 2022-12-06 PROCEDURE — 1159F PR MEDICATION LIST DOCUMENTED IN MEDICAL RECORD: ICD-10-PCS | Mod: CPTII,,, | Performed by: STUDENT IN AN ORGANIZED HEALTH CARE EDUCATION/TRAINING PROGRAM

## 2022-12-06 PROCEDURE — 3008F PR BODY MASS INDEX (BMI) DOCUMENTED: ICD-10-PCS | Mod: CPTII,,, | Performed by: STUDENT IN AN ORGANIZED HEALTH CARE EDUCATION/TRAINING PROGRAM

## 2022-12-06 PROCEDURE — 90715 TDAP VACCINE 7 YRS/> IM: CPT | Mod: ,,, | Performed by: STUDENT IN AN ORGANIZED HEALTH CARE EDUCATION/TRAINING PROGRAM

## 2022-12-06 PROCEDURE — 3078F DIAST BP <80 MM HG: CPT | Mod: CPTII,,, | Performed by: STUDENT IN AN ORGANIZED HEALTH CARE EDUCATION/TRAINING PROGRAM

## 2022-12-06 PROCEDURE — 90471 TDAP VACCINE GREATER THAN OR EQUAL TO 7YO IM: ICD-10-PCS | Mod: ,,, | Performed by: STUDENT IN AN ORGANIZED HEALTH CARE EDUCATION/TRAINING PROGRAM

## 2022-12-06 PROCEDURE — 81002 POCT URINE DIPSTICK WITHOUT MICROSCOPE: ICD-10-PCS | Mod: ,,, | Performed by: STUDENT IN AN ORGANIZED HEALTH CARE EDUCATION/TRAINING PROGRAM

## 2022-12-06 PROCEDURE — 99396 PREV VISIT EST AGE 40-64: CPT | Mod: 25,,, | Performed by: STUDENT IN AN ORGANIZED HEALTH CARE EDUCATION/TRAINING PROGRAM

## 2022-12-06 RX ORDER — OLMESARTAN MEDOXOMIL 5 MG/1
10 TABLET ORAL 2 TIMES DAILY
Qty: 60 TABLET | Refills: 3 | Status: SHIPPED | OUTPATIENT
Start: 2022-12-06 | End: 2023-01-19

## 2022-12-06 NOTE — LETTER
AUTHORIZATION FOR RELEASE OF   CONFIDENTIAL INFORMATION    Dear Dr CRUZ Bagley    We are seeing Eb Alfaro, date of birth 1970, in the clinic at [facility]. Nick Monzon MD is the patient's PCP. Eb Alfaro has an outstanding lab/procedure at the time we reviewed his chart. In order to help keep his health information updated, he has authorized us to request the following medical record(s):        (  )  MAMMOGRAM                                      (  x)  COLONOSCOPY      (  )  PAP SMEAR                                          (  )  OUTSIDE LAB RESULTS     (  )  DEXA SCAN                                          (  )  EYE EXAM            (  )  FOOT EXAM                                          (  )  ENTIRE RECORD     (  )  OUTSIDE IMMUNIZATIONS                 (  )  _______________         Please fax records to [facility], Jeffrey Fontenot, MD, @[phone]     If you have any questions, please contact @[phone]          Patient Name: Eb Alfaro  : 1970  Patient Phone #: [phone]

## 2022-12-06 NOTE — PROGRESS NOTES
ANNUAL WELLNESS NOTE    Chief Complaint:  Hypertension and Neck Pain (Hx neck surgery)     HPI:  Eb Alfaro is a 52 y.o. male    ----------------------------  Digestive disorder  Hypertension  Thyroid disease    Patient Care Team:  Nick Monzon MD as PCP - General (Internal Medicine)  Yaakov Gaviria MD as Consulting Physician (Gastroenterology)    Presents today for wellness visit. Describes overall health as good. Diet is balanced. Exercises rarely, but active at work. Tobacco use: previous but quit around 2010. Alcohol use: 5-10 drinks/wk. Caffeine intake: 2-3 caffeinated drinks daily. Eye exam: never (denies vision problems). Dental exam: twice annually.    C/o urinary urgency and frequency, concerned he has UTI.        Review of Systems   Constitutional:  Negative for chills, fever and unexpected weight change.   HENT:  Negative for sinus pressure/congestion and sore throat.    Eyes:  Negative for pain and redness.   Respiratory:  Negative for cough, shortness of breath and wheezing.    Cardiovascular:  Negative for chest pain and leg swelling.   Gastrointestinal:  Negative for abdominal pain, nausea and vomiting.   Endocrine: Negative for polydipsia and polyuria.   Genitourinary:  Positive for frequency and urgency. Negative for dysuria and hematuria.   Musculoskeletal:  Negative for arthralgias and myalgias.   Integumentary:  Negative for rash and mole/lesion.   Neurological:  Negative for dizziness, syncope and headaches.   Hematological:  Negative for adenopathy. Does not bruise/bleed easily.   Psychiatric/Behavioral:  Negative for confusion. The patient is not nervous/anxious.      Physical Exam  Vitals and nursing note reviewed.   Constitutional:       General: He is not in acute distress.     Appearance: He is not ill-appearing.   HENT:      Nose: No congestion or rhinorrhea.      Mouth/Throat:      Mouth: Mucous membranes are moist.      Pharynx: No oropharyngeal exudate or posterior  oropharyngeal erythema.   Eyes:      Extraocular Movements: Extraocular movements intact.      Conjunctiva/sclera: Conjunctivae normal.      Pupils: Pupils are equal, round, and reactive to light.   Cardiovascular:      Rate and Rhythm: Normal rate and regular rhythm.   Pulmonary:      Effort: Pulmonary effort is normal.      Breath sounds: Normal breath sounds.   Abdominal:      Palpations: Abdomen is soft. There is no mass.      Tenderness: There is no abdominal tenderness.   Musculoskeletal:         General: No deformity.      Right lower leg: No edema.      Left lower leg: No edema.   Skin:     General: Skin is warm and dry.      Findings: No rash.   Neurological:      General: No focal deficit present.      Mental Status: He is alert. Mental status is at baseline.   Psychiatric:         Mood and Affect: Mood normal.         Behavior: Behavior normal.       Health Maintenance:  Vaccinations:   - Flu: typically does not receive, declines   - Pneumonia: n/a   - Shingles (>50): discussed, pt declines   - Tdap: given today in clinic   - COVID: Did not receive  Screening:   - Prostate (>50): PSA ordered today   - Lung Ca. Screening (50-80 with >20 pack yrs current or quit <15 yrs): Quit 12 years ago. Discussed risks/benefit of screening, pt chooses to proceed. Ordered today   - Colon Ca. Screening (age >45): had c-scope approx 2017 (Dr. Yaakov Gaviria), due after 10 years   - AAA (65-75 if ever smoked):       Assessment/Plan:  1. Urinary urgency  Comments:  UA in clinic no UTI. Most likely starting to develop some BPH. PSA is ordered. Will start Flomax if PSA normal vs referral to Uro. Rpt UA ordered (intact blood)  Orders:  -     POCT URINE DIPSTICK WITHOUT MICROSCOPE    2. Wellness examination  Comments:  ordered routine wellness labs, see above for health maintenance   Orders:  -     CBC Auto Differential; Future; Expected date: 12/06/2022  -     Comprehensive Metabolic Panel; Future; Expected date: 12/06/2022  -      Hemoglobin A1C; Future; Expected date: 12/06/2022  -     Lipid Panel; Future; Expected date: 12/06/2022  -     PSA, Screening; Future; Expected date: 12/06/2022  -     TSH; Future; Expected date: 12/06/2022    3. Primary hypertension  Overview:  Intolerant of ACE-I and BB in past    Assessment & Plan:  Not at goal (initial today in clinic 150/87 partially improved to 138/88)  Increase olmesartan to 10 mg BID. Pt has had BP fluctuations in past when med taking daily, worked better as BID so keep it this way  Pt will monitor BP at home and report any abnormal values    Orders:  -     olmesartan (BENICAR) 5 MG Tab; Take 2 tablets (10 mg total) by mouth 2 (two) times daily.  Dispense: 60 tablet; Refill: 3    4. H/O parathyroidectomy  Comments:  reports some episodic weakness a few months ago which resolved with Tums. Checking Ca levels, PTH, TSH, T3/T4  Orders:  -     Comprehensive Metabolic Panel; Future; Expected date: 12/06/2022  -     T3, Free (OLG); Future; Expected date: 12/06/2022  -     T4, Free; Future; Expected date: 12/06/2022  -     PTH, Intact; Future; Expected date: 12/06/2022    5. Screening for malignant neoplasm of prostate  -     PSA, Screening; Future; Expected date: 12/06/2022    6. Need for diphtheria-tetanus-pertussis (Tdap) vaccine  -     (In Office Administered) Tdap Vaccine    7. Former smoker  -     CT Chest Lung Screening Low Dose; Future; Expected date: 12/13/2022    8. Smoking greater than 20 pack years  -     CT Chest Lung Screening Low Dose; Future; Expected date: 12/13/2022    9. Vitamin D deficiency  -     Vitamin D; Future; Expected date: 12/06/2022      Follow up in about 6 months (around 6/6/2023) for HTN.

## 2022-12-06 NOTE — ASSESSMENT & PLAN NOTE
Not at goal (initial today in clinic 150/87 partially improved to 138/88)  Increase olmesartan to 10 mg BID. Pt has had BP fluctuations in past when med taking daily, worked better as BID so keep it this way  Pt will monitor BP at home and report any abnormal values

## 2022-12-08 ENCOUNTER — TELEPHONE (OUTPATIENT)
Dept: PRIMARY CARE CLINIC | Facility: CLINIC | Age: 52
End: 2022-12-08
Payer: COMMERCIAL

## 2022-12-08 NOTE — TELEPHONE ENCOUNTER
----- Message from Nick Monzon MD sent at 12/8/2022  1:09 PM CST -----  Please tell Mr. Alfaro that his lung cancer screening CT was negative. We can repeat this in 1 to 2 years. We'll discuss that at his wellness visit each year.    Thanks  Nick Monzon MD

## 2022-12-09 ENCOUNTER — LAB VISIT (OUTPATIENT)
Dept: LAB | Facility: HOSPITAL | Age: 52
End: 2022-12-09
Attending: STUDENT IN AN ORGANIZED HEALTH CARE EDUCATION/TRAINING PROGRAM
Payer: COMMERCIAL

## 2022-12-09 DIAGNOSIS — R31.29 MICROSCOPIC HEMATURIA: ICD-10-CM

## 2022-12-09 DIAGNOSIS — Z90.89 H/O PARATHYROIDECTOMY: ICD-10-CM

## 2022-12-09 DIAGNOSIS — Z98.890 H/O PARATHYROIDECTOMY: ICD-10-CM

## 2022-12-09 DIAGNOSIS — Z12.5 SCREENING FOR MALIGNANT NEOPLASM OF PROSTATE: ICD-10-CM

## 2022-12-09 DIAGNOSIS — Z00.00 WELLNESS EXAMINATION: ICD-10-CM

## 2022-12-09 DIAGNOSIS — E55.9 VITAMIN D DEFICIENCY: ICD-10-CM

## 2022-12-09 LAB
ALBUMIN SERPL-MCNC: 4.7 GM/DL (ref 3.5–5)
ALBUMIN/GLOB SERPL: 1.5 RATIO (ref 1.1–2)
ALP SERPL-CCNC: 105 UNIT/L (ref 40–150)
ALT SERPL-CCNC: 23 UNIT/L (ref 0–55)
APPEARANCE UR: CLEAR
AST SERPL-CCNC: 22 UNIT/L (ref 5–34)
BACTERIA #/AREA URNS AUTO: ABNORMAL /HPF
BASOPHILS # BLD AUTO: 0.08 X10(3)/MCL (ref 0–0.2)
BASOPHILS NFR BLD AUTO: 1.2 %
BILIRUB UR QL STRIP.AUTO: NEGATIVE MG/DL
BILIRUBIN DIRECT+TOT PNL SERPL-MCNC: 1.3 MG/DL
BUN SERPL-MCNC: 16.6 MG/DL (ref 8.4–25.7)
CALCIUM SERPL-MCNC: 10.4 MG/DL (ref 8.4–10.2)
CHLORIDE SERPL-SCNC: 106 MMOL/L (ref 98–107)
CHOLEST SERPL-MCNC: 232 MG/DL
CHOLEST/HDLC SERPL: 5 {RATIO} (ref 0–5)
CO2 SERPL-SCNC: 29 MMOL/L (ref 22–29)
COLOR UR AUTO: YELLOW
CREAT SERPL-MCNC: 1.37 MG/DL (ref 0.73–1.18)
DEPRECATED CALCIDIOL+CALCIFEROL SERPL-MC: 94.3 NG/ML (ref 30–80)
EOSINOPHIL # BLD AUTO: 0.2 X10(3)/MCL (ref 0–0.9)
EOSINOPHIL NFR BLD AUTO: 3 %
ERYTHROCYTE [DISTWIDTH] IN BLOOD BY AUTOMATED COUNT: 12.4 % (ref 11.5–17)
EST. AVERAGE GLUCOSE BLD GHB EST-MCNC: 111.2 MG/DL
GFR SERPLBLD CREATININE-BSD FMLA CKD-EPI: >60 MLS/MIN/1.73/M2
GLOBULIN SER-MCNC: 3.2 GM/DL (ref 2.4–3.5)
GLUCOSE SERPL-MCNC: 95 MG/DL (ref 74–100)
GLUCOSE UR QL STRIP.AUTO: NEGATIVE MG/DL
HBA1C MFR BLD: 5.5 %
HCT VFR BLD AUTO: 49.2 % (ref 42–52)
HDLC SERPL-MCNC: 44 MG/DL (ref 35–60)
HGB BLD-MCNC: 16.2 GM/DL (ref 14–18)
IMM GRANULOCYTES # BLD AUTO: 0.03 X10(3)/MCL (ref 0–0.04)
IMM GRANULOCYTES NFR BLD AUTO: 0.4 %
KETONES UR QL STRIP.AUTO: NEGATIVE MG/DL
LDLC SERPL CALC-MCNC: 170 MG/DL (ref 50–140)
LEUKOCYTE ESTERASE UR QL STRIP.AUTO: ABNORMAL UNIT/L
LYMPHOCYTES # BLD AUTO: 2.01 X10(3)/MCL (ref 0.6–4.6)
LYMPHOCYTES NFR BLD AUTO: 29.9 %
MCH RBC QN AUTO: 29.9 PG (ref 27–31)
MCHC RBC AUTO-ENTMCNC: 32.9 MG/DL (ref 33–36)
MCV RBC AUTO: 90.8 FL (ref 80–94)
MONOCYTES # BLD AUTO: 0.73 X10(3)/MCL (ref 0.1–1.3)
MONOCYTES NFR BLD AUTO: 10.9 %
NEUTROPHILS # BLD AUTO: 3.7 X10(3)/MCL (ref 2.1–9.2)
NEUTROPHILS NFR BLD AUTO: 54.6 %
NITRITE UR QL STRIP.AUTO: NEGATIVE
NRBC BLD AUTO-RTO: 0 %
PH UR STRIP.AUTO: 5.5 [PH]
PLATELET # BLD AUTO: 264 X10(3)/MCL (ref 130–400)
PMV BLD AUTO: 10.1 FL (ref 7.4–10.4)
POTASSIUM SERPL-SCNC: 4.6 MMOL/L (ref 3.5–5.1)
PROT SERPL-MCNC: 7.9 GM/DL (ref 6.4–8.3)
PROT UR QL STRIP.AUTO: NEGATIVE MG/DL
PSA SERPL-MCNC: 0.26 NG/ML
PTH-INTACT SERPL-MCNC: 97.8 PG/ML (ref 8.7–77)
RBC # BLD AUTO: 5.42 X10(6)/MCL (ref 4.7–6.1)
RBC #/AREA URNS AUTO: <5 /HPF
RBC UR QL AUTO: NEGATIVE UNIT/L
SODIUM SERPL-SCNC: 147 MMOL/L (ref 136–145)
SP GR UR STRIP.AUTO: 1.03 (ref 1–1.03)
SQUAMOUS #/AREA URNS AUTO: <5 /HPF
T3FREE SERPL-MCNC: 2.89 PG/ML (ref 1.57–3.91)
T4 FREE SERPL-MCNC: 1.11 NG/DL (ref 0.7–1.48)
TRIGL SERPL-MCNC: 89 MG/DL (ref 34–140)
TSH SERPL-ACNC: 3.97 UIU/ML (ref 0.35–4.94)
UROBILINOGEN UR STRIP-ACNC: 1 MG/DL
VLDLC SERPL CALC-MCNC: 18 MG/DL
WBC # SPEC AUTO: 6.7 X10(3)/MCL (ref 4.5–11.5)
WBC #/AREA URNS AUTO: 6 /HPF

## 2022-12-09 PROCEDURE — 84153 ASSAY OF PSA TOTAL: CPT

## 2022-12-09 PROCEDURE — 84481 FREE ASSAY (FT-3): CPT

## 2022-12-09 PROCEDURE — 83970 ASSAY OF PARATHORMONE: CPT

## 2022-12-09 PROCEDURE — 82306 VITAMIN D 25 HYDROXY: CPT

## 2022-12-09 PROCEDURE — 36415 COLL VENOUS BLD VENIPUNCTURE: CPT

## 2022-12-09 PROCEDURE — 81001 URINALYSIS AUTO W/SCOPE: CPT

## 2022-12-09 PROCEDURE — 80053 COMPREHEN METABOLIC PANEL: CPT

## 2022-12-09 PROCEDURE — 84439 ASSAY OF FREE THYROXINE: CPT

## 2022-12-09 PROCEDURE — 80061 LIPID PANEL: CPT

## 2022-12-09 PROCEDURE — 85025 COMPLETE CBC W/AUTO DIFF WBC: CPT

## 2022-12-09 PROCEDURE — 84443 ASSAY THYROID STIM HORMONE: CPT

## 2022-12-09 PROCEDURE — 83036 HEMOGLOBIN GLYCOSYLATED A1C: CPT

## 2022-12-12 ENCOUNTER — TELEPHONE (OUTPATIENT)
Dept: PRIMARY CARE CLINIC | Facility: CLINIC | Age: 52
End: 2022-12-12
Payer: COMMERCIAL

## 2022-12-12 DIAGNOSIS — E21.0 HYPERPARATHYROIDISM, PRIMARY: ICD-10-CM

## 2022-12-12 DIAGNOSIS — E78.00 HYPERCHOLESTEROLEMIA: Primary | ICD-10-CM

## 2022-12-12 DIAGNOSIS — E83.52 HYPERCALCEMIA: ICD-10-CM

## 2022-12-12 RX ORDER — ROSUVASTATIN CALCIUM 10 MG/1
10 TABLET, COATED ORAL NIGHTLY
Qty: 90 TABLET | Refills: 3 | Status: SHIPPED | OUTPATIENT
Start: 2022-12-12 | End: 2023-12-26

## 2022-12-12 NOTE — TELEPHONE ENCOUNTER
Spoke to patient .  Patient reports that he has been feeling weak, light headed  since Sunday.   Patient reported he did take only one of his benicar  5 mg at  7 am this morning.  Patient reports that he never monitored his blood pressures while on the new dose of benicar/.   Patient reports his blood pressure was 147/97 this morning.  Patient advised to come in to get his blood pressure machine checked against office machine.

## 2022-12-12 NOTE — TELEPHONE ENCOUNTER
Spoke to pt on phone today. Confirmed name and . Reviewed recent labs. Several abnormalities noted:    Elevated creatinine (1.37) - eGFR normal fortunately. Pt takes 800 mg Ibuprofen daily and admits to poor fluid intake. Counseled on importance of water intake sec to mild LEXUS. Stop NSAIDs. Take Tylenol for neck pain. Repeat labs in 2-3 weeks.  Elevated calcium (10.4), Elevated Vit D (93), Elevated PTH (97) - ensured no calcium intake (was taking Tums previously but none now). Instructed to stop all Vit D intake. The high PTH is more concerning as pt is already s/p single parathyroidectomy. We'll follow this closely. If still elevated in a few weeks will need referral back to ENT.  Elevated LDL and total chol - ASCVD 7-9% depending on BP measurement used. Recommended he start statin therapy. Discussed risks/benefits. Pt expressed understanding and will start Crestor 10 mg daily.    Setting reminder in EMR to check this chart in 3 weeks to ensure pt has gotten labs done and that his hypercalcemia, hyperparathyroidism, LEXUS, etc is not overlooked.     Nick Monzon MD

## 2022-12-12 NOTE — TELEPHONE ENCOUNTER
----- Message from Aminta Puente sent at 12/12/2022  8:28 AM CST -----  Regarding: advice  Type:  Needs Medical Advice    Who Called: pt  Symptoms (please be specific): light headed, hot flashes  How long has patient had these symptoms:  yesterday  Pharmacy name and phone #:  [facility]  Would the patient rather a call back or a response via [facility]? C/b  Best Call Back Number: [phone]  Additional Information: pt thinks it is due to increase of dosage of medication started gradually increasing the medication since tuesday

## 2022-12-12 NOTE — TELEPHONE ENCOUNTER
Patient came in   157/83   70  pr  Patient educated on the importance of taking his medication as recommended.  Patient educated on checking his blood pressure daily.  Patient verbalized understanding .  Patient did ask for his recent lab results advised that nurse would call when labs are resulted.

## 2022-12-19 ENCOUNTER — HOSPITAL ENCOUNTER (EMERGENCY)
Facility: HOSPITAL | Age: 52
Discharge: HOME | End: 2022-12-19
Attending: EMERGENCY MEDICINE
Payer: COMMERCIAL

## 2022-12-19 VITALS
OXYGEN SATURATION: 96 % | BODY MASS INDEX: 30.48 KG/M2 | DIASTOLIC BLOOD PRESSURE: 70 MMHG | HEART RATE: 64 BPM | TEMPERATURE: 98 F | HEIGHT: 72 IN | RESPIRATION RATE: 17 BRPM | SYSTOLIC BLOOD PRESSURE: 128 MMHG | WEIGHT: 225 LBS

## 2022-12-19 DIAGNOSIS — N20.1 RIGHT URETERAL STONE: Primary | ICD-10-CM

## 2022-12-19 LAB
ALBUMIN SERPL-MCNC: 4.5 G/DL (ref 3.5–5)
ALBUMIN/GLOB SERPL: 1.5 RATIO (ref 1.1–2)
ALP SERPL-CCNC: 94 UNIT/L (ref 40–150)
ALT SERPL-CCNC: 24 UNIT/L (ref 0–55)
APPEARANCE UR: CLEAR
AST SERPL-CCNC: 21 UNIT/L (ref 5–34)
BACTERIA #/AREA URNS AUTO: NORMAL /HPF
BASOPHILS # BLD AUTO: 0.06 X10(3)/MCL (ref 0–0.2)
BASOPHILS NFR BLD AUTO: 1 %
BILIRUB UR QL STRIP.AUTO: NEGATIVE MG/DL
BILIRUBIN DIRECT+TOT PNL SERPL-MCNC: 0.9 MG/DL
BUN SERPL-MCNC: 15.2 MG/DL (ref 8.4–25.7)
CALCIUM SERPL-MCNC: 10 MG/DL (ref 8.4–10.2)
CHLORIDE SERPL-SCNC: 102 MMOL/L (ref 98–107)
CO2 SERPL-SCNC: 30 MMOL/L (ref 22–29)
COLOR UR AUTO: YELLOW
CREAT SERPL-MCNC: 1.31 MG/DL (ref 0.73–1.18)
EOSINOPHIL # BLD AUTO: 0.13 X10(3)/MCL (ref 0–0.9)
EOSINOPHIL NFR BLD AUTO: 2.2 %
ERYTHROCYTE [DISTWIDTH] IN BLOOD BY AUTOMATED COUNT: 12.3 % (ref 11.6–14.4)
GFR SERPLBLD CREATININE-BSD FMLA CKD-EPI: >60 MLS/MIN/1.73/M2
GLOBULIN SER-MCNC: 3 GM/DL (ref 2.4–3.5)
GLUCOSE SERPL-MCNC: 122 MG/DL (ref 74–100)
GLUCOSE UR QL STRIP.AUTO: NEGATIVE MG/DL
HCT VFR BLD AUTO: 47.5 % (ref 42–52)
HGB BLD-MCNC: 16.1 GM/DL (ref 14–18)
IMM GRANULOCYTES # BLD AUTO: 0.02 X10(3)/MCL (ref 0–0.04)
IMM GRANULOCYTES NFR BLD AUTO: 0.3 %
KETONES UR QL STRIP.AUTO: NEGATIVE MG/DL
LEUKOCYTE ESTERASE UR QL STRIP.AUTO: NEGATIVE UNIT/L
LIPASE SERPL-CCNC: 47 U/L
LYMPHOCYTES # BLD AUTO: 1.34 X10(3)/MCL (ref 0.6–4.6)
LYMPHOCYTES NFR BLD AUTO: 23 %
MCH RBC QN AUTO: 29.9 PG
MCHC RBC AUTO-ENTMCNC: 33.9 MG/DL (ref 33–36)
MCV RBC AUTO: 88.3 FL (ref 80–94)
MONOCYTES # BLD AUTO: 0.46 X10(3)/MCL (ref 0.1–1.3)
MONOCYTES NFR BLD AUTO: 7.9 %
NEUTROPHILS # BLD AUTO: 3.82 X10(3)/MCL (ref 2.1–9.2)
NEUTROPHILS NFR BLD AUTO: 65.6 %
NITRITE UR QL STRIP.AUTO: NEGATIVE
NRBC BLD AUTO-RTO: 0 % (ref 0–1)
PH UR STRIP.AUTO: 6.5 [PH]
PLATELET # BLD AUTO: 241 X10(3)/MCL (ref 140–371)
PMV BLD AUTO: 9.9 FL (ref 9.4–12.4)
POTASSIUM SERPL-SCNC: 4.6 MMOL/L (ref 3.5–5.1)
PROT SERPL-MCNC: 7.5 GM/DL (ref 6.4–8.3)
PROT UR QL STRIP.AUTO: NEGATIVE MG/DL
RBC # BLD AUTO: 5.38 X10(6)/MCL (ref 4.7–6.1)
RBC #/AREA URNS AUTO: <5 /HPF
RBC UR QL AUTO: NEGATIVE UNIT/L
SODIUM SERPL-SCNC: 139 MMOL/L (ref 136–145)
SP GR UR STRIP.AUTO: 1.01 (ref 1–1.03)
SQUAMOUS #/AREA URNS AUTO: <5 /HPF
UROBILINOGEN UR STRIP-ACNC: 0.2 MG/DL
WBC # SPEC AUTO: 5.8 X10(3)/MCL (ref 4.5–11.5)
WBC #/AREA URNS AUTO: <5 /HPF

## 2022-12-19 PROCEDURE — 85025 COMPLETE CBC W/AUTO DIFF WBC: CPT | Performed by: EMERGENCY MEDICINE

## 2022-12-19 PROCEDURE — 80053 COMPREHEN METABOLIC PANEL: CPT | Performed by: EMERGENCY MEDICINE

## 2022-12-19 PROCEDURE — 96374 THER/PROPH/DIAG INJ IV PUSH: CPT

## 2022-12-19 PROCEDURE — 83690 ASSAY OF LIPASE: CPT | Performed by: EMERGENCY MEDICINE

## 2022-12-19 PROCEDURE — 63600175 PHARM REV CODE 636 W HCPCS: Performed by: EMERGENCY MEDICINE

## 2022-12-19 PROCEDURE — 99285 EMERGENCY DEPT VISIT HI MDM: CPT | Mod: 25

## 2022-12-19 PROCEDURE — 81001 URINALYSIS AUTO W/SCOPE: CPT | Performed by: EMERGENCY MEDICINE

## 2022-12-19 PROCEDURE — 25500020 PHARM REV CODE 255: Performed by: EMERGENCY MEDICINE

## 2022-12-19 PROCEDURE — 96372 THER/PROPH/DIAG INJ SC/IM: CPT | Performed by: EMERGENCY MEDICINE

## 2022-12-19 RX ORDER — ONDANSETRON 2 MG/ML
4 INJECTION INTRAMUSCULAR; INTRAVENOUS
Status: COMPLETED | OUTPATIENT
Start: 2022-12-19 | End: 2022-12-19

## 2022-12-19 RX ORDER — TAMSULOSIN HYDROCHLORIDE 0.4 MG/1
0.4 CAPSULE ORAL DAILY
Qty: 14 CAPSULE | Refills: 0 | Status: ON HOLD | OUTPATIENT
Start: 2022-12-19 | End: 2023-03-23 | Stop reason: HOSPADM

## 2022-12-19 RX ORDER — ONDANSETRON 4 MG/1
4 TABLET, ORALLY DISINTEGRATING ORAL EVERY 6 HOURS PRN
Qty: 30 TABLET | Refills: 0 | Status: ON HOLD | OUTPATIENT
Start: 2022-12-19 | End: 2023-03-23 | Stop reason: HOSPADM

## 2022-12-19 RX ORDER — OXYCODONE AND ACETAMINOPHEN 10; 325 MG/1; MG/1
1 TABLET ORAL EVERY 6 HOURS PRN
Qty: 15 TABLET | Refills: 0 | Status: SHIPPED | OUTPATIENT
Start: 2022-12-19 | End: 2022-12-24

## 2022-12-19 RX ORDER — KETOROLAC TROMETHAMINE 10 MG/1
10 TABLET, FILM COATED ORAL EVERY 6 HOURS PRN
Qty: 15 TABLET | Refills: 0 | Status: SHIPPED | OUTPATIENT
Start: 2022-12-19 | End: 2022-12-24

## 2022-12-19 RX ORDER — DICYCLOMINE HYDROCHLORIDE 10 MG/ML
20 INJECTION INTRAMUSCULAR
Status: COMPLETED | OUTPATIENT
Start: 2022-12-19 | End: 2022-12-19

## 2022-12-19 RX ADMIN — IOPAMIDOL 100 ML: 755 INJECTION, SOLUTION INTRAVENOUS at 09:12

## 2022-12-19 RX ADMIN — ONDANSETRON 4 MG: 2 INJECTION INTRAMUSCULAR; INTRAVENOUS at 08:12

## 2022-12-19 RX ADMIN — DICYCLOMINE HYDROCHLORIDE 20 MG: 20 INJECTION, SOLUTION INTRAMUSCULAR at 08:12

## 2022-12-19 NOTE — ED PROVIDER NOTES
"Encounter Date: 12/19/2022    SCRIBE #1 NOTE: I, Meera Miner, am scribing for, and in the presence of,  Aster Sewell DO. I have scribed the following portions of the note - Other sections scribed: HPI, ROS and physical.     History     Chief Complaint   Patient presents with    Abdominal Pain     Since Tuesday accompanied by "ribbon stools" denies blood in stools, reports taking 2 fleet enemas yesterday and suppository with little relief, states a family hx of colon cancer, pt has been screened for colon cancer twice with no evidence of cancer, see Dr. Gaviria, denies urinary complaints    Flank Pain     51 y/o male with a medical hx of HTN and thyroid disease is presented to the ED for abdominal pain onset six days. The pt stated that he first noticed R sided flank pain, which caused him to pay more attention to his BM's; he reports that he has been experiencing a decline in the amount of BM equivalent to his consumption. The pt's last BM was yesterday, and he reports to have taken two fleet edema and a suppository without any relief. He stated that the pain is a ' burning sensation' and it is not worsened by anything.  The pt reports to have had a colonoscopy done eleven years ago where multiple polyps were removed, and another colonoscopy about seven years ago where no more polyps were found. His father has a history of colon cancer which is why he came to get checked out.  He denies any vomiting, hematochezia, dysuria and fever. He admits to bloating, diarrhea and nausea.     The pt's PCP is Minesh Monzon MD. The pt's GI is Mor Gaviria MD.     The history is provided by the patient. No  was used.   Abdominal Pain  The current episode started several days ago. The onset of the illness was abrupt. The problem has not changed since onset.The abdominal pain is located in the RLQ. The abdominal pain does not radiate. The severity of the abdominal pain is 7/10. The abdominal " pain is relieved by nothing. The other symptoms of the illness include nausea and diarrhea. The other symptoms of the illness do not include fever, shortness of breath, vomiting, dysuria or hematochezia.   The diarrhea began more than 1 week ago. The diarrhea is loose.   Nausea began today.   The patient has had a change in bowel habit. Additional symptoms associated with the illness include constipation. Symptoms associated with the illness do not include chills, urgency, hematuria or frequency.   Flank Pain  This is a new problem. The current episode started more than 2 days ago. The problem occurs constantly. The problem has not changed since onset.Associated symptoms include abdominal pain. Pertinent negatives include no chest pain and no shortness of breath. Nothing aggravates the symptoms. Nothing relieves the symptoms. He has tried nothing for the symptoms. The treatment provided no relief.   Review of patient's allergies indicates:   Allergen Reactions    Cefdinir Anaphylaxis    Penicillins Anaphylaxis    Hydrocodone-acetaminophen Itching    Morphine Hives    Opioids - morphine analogues Hives     Past Medical History:   Diagnosis Date    Digestive disorder     Hypertension     Thyroid disease      Past Surgical History:   Procedure Laterality Date    APPENDECTOMY      Bilateral L4-5, L5-S1 decompression; right L5-S1 microdiscectomy  03/08/2022    Dr. Rodriguez    C3-5 laminectomies, C2-C6 posterior instrumented fusion  03/08/2022    Dr. Rodriguez    CHOLECYSTECTOMY      colonocscopy      LEFT HEART CATHETERIZATION  10/16/2017    PARATHYROIDECTOMY N/A 05/06/2022    Procedure: PARATHYROIDECTOMY;  Surgeon: Ajay Blum MD;  Location: [facility];  Service: ENT;  Laterality: N/A;  PARATHYROIDECTOMY // NERVE MONITOR // FROZEN SECTION // INTRA OP PTH // SUPINE     Family History   Problem Relation Age of Onset    Cancer Mother         meningiomas    Cancer Father         colon     Social History     Tobacco Use     Smoking status: Former    Smokeless tobacco: Never   Substance Use Topics    Alcohol use: Yes     Alcohol/week: 2.0 standard drinks     Types: 2 Standard drinks or equivalent per week    Drug use: No     Review of Systems   Constitutional:  Negative for appetite change, chills and fever.   HENT:  Negative for congestion and sore throat.    Eyes:  Negative for pain and visual disturbance.   Respiratory:  Negative for cough and shortness of breath.    Cardiovascular:  Negative for chest pain and leg swelling.   Gastrointestinal:  Positive for abdominal pain, constipation, diarrhea and nausea. Negative for hematochezia and vomiting.   Genitourinary:  Positive for flank pain. Negative for difficulty urinating, dysuria, frequency, hematuria and urgency.   Skin:  Negative for rash and wound.   Allergic/Immunologic: Negative for food allergies and immunocompromised state.   Neurological:  Negative for seizures, syncope and weakness.     Physical Exam     Initial Vitals [12/19/22 0718]   BP Pulse Resp Temp SpO2   (!) 188/101 69 18 97.7 °F (36.5 °C) 99 %      MAP       --         Physical Exam    Nursing note and vitals reviewed.  Constitutional: He appears well-developed and well-nourished. He is not diaphoretic. He does not appear ill. No distress.   HENT:   Head: Normocephalic and atraumatic.   Right Ear: External ear normal.   Left Ear: External ear normal.   Nose: Nose normal.   Mouth/Throat: Oropharynx is clear and moist.   Eyes: Conjunctivae and EOM are normal. Pupils are equal, round, and reactive to light.   Neck: Neck supple. No tracheal deviation present.   Cardiovascular:  Normal rate, regular rhythm, normal heart sounds and intact distal pulses.           No murmur heard.  Pulmonary/Chest: Breath sounds normal. No respiratory distress. He has no wheezes. He has no rhonchi. He has no rales.   Abdominal: Abdomen is soft. Bowel sounds are normal. He exhibits no distension. There is abdominal tenderness in the right  lower quadrant.   No right CVA tenderness.  No left CVA tenderness. There is no rebound and no guarding.   Musculoskeletal:         General: No edema. Normal range of motion.      Cervical back: Neck supple.     Neurological: He is alert and oriented to person, place, and time. He has normal strength. No cranial nerve deficit or sensory deficit. GCS score is 15. GCS eye subscore is 4. GCS verbal subscore is 5. GCS motor subscore is 6.   Skin: Skin is warm and dry. Capillary refill takes less than 2 seconds.   Psychiatric: He has a normal mood and affect. His mood appears not anxious.       ED Course   Procedures  Labs Reviewed   COMPREHENSIVE METABOLIC PANEL - Abnormal; Notable for the following components:       Result Value    Carbon Dioxide 30 (*)     Glucose Level 122 (*)     Creatinine 1.31 (*)     All other components within normal limits   LIPASE - Normal   URINALYSIS, REFLEX TO URINE CULTURE - Normal   URINALYSIS, MICROSCOPIC - Normal   CBC W/ AUTO DIFFERENTIAL    Narrative:     The following orders were created for panel order CBC W/ AUTO DIFFERENTIAL.  Procedure                               Abnormality         Status                     ---------                               -----------         ------                     CBC with Differential[[id number]]                            Final result                 Please view results for these tests on the individual orders.   CBC WITH DIFFERENTIAL          Imaging Results              CT Abdomen Pelvis With Contrast (Final result)  Result time 12/19/22 09:23:18      Final result by Mando Clemons MD (12/19/22 09:23:18)                   Impression:      Proximal left ureteral stone producing minimal upstream left collecting system dilatation.  No other acute process identified.      Electronically signed by: Mando Clemons  Date:    12/19/2022  Time:    09:23               Narrative:    EXAMINATION:  CT ABDOMEN PELVIS WITH CONTRAST    CLINICAL HISTORY:  RLQ  abdominal pain (Age >= 14y);    TECHNIQUE:  CT imaging of the abdomen and pelvis after the administration of 100 mL of Isovue 370 intravenous contrast.  Oral contrast not administered. Dose length product 852 mGycm. Automatic exposure control, adjustment of mA/kV or iterative reconstruction technique used to limit radiation dose.    COMPARISON:  Chest CT 12/08/2022    FINDINGS:  Liver/biliary: Subcentimeter hypodensity in the right liver too small to fully assess, but statistically benign, likely tiny cyst.  Gallbladder not seen.  No biliary dilatation.    Pancreas: Normal.    Spleen: Normal.    Adrenals: Normal.    Genitourinary: A stone in the proximal left ureter measures 6 mm in transverse diameter and 9 mm in length.  Minimal upstream collecting system dilatation.  Bladder within normal limits. Prostate size upper normal.    Stomach/bowel: No evidence of bowel obstruction. Normal appendix. No definitive sites of bowel inflammation.    Lymph nodes/peritoneum: No pathologically enlarged lymph node identified. No ascites or free air. No fluid collection.    Vasculature: Mild aortic and iliac artery calcification.  No abdominal aortic aneurysm.    Abdominal wall: Normal.    Lung bases: No consolidation or pleural effusion.  4 mm solid nodule in the right middle lobe unchanged from recent chest CT.    Musculoskeletal: No acute osseous findings.                                       Medications   ondansetron injection 4 mg (4 mg Intravenous Given 12/19/22 0814)   dicyclomine injection 20 mg (20 mg Intramuscular Given 12/19/22 0814)   iopamidoL (ISOVUE-370) injection 100 mL (100 mLs Intravenous Given 12/19/22 0901)     Medical Decision Making:   History:   Old Records Summarized: records from clinic visits.       <> Summary of Records: PCP visit 12/6/22:  - Colon Ca. Screening (age >45): had c-scope approx 2017 (Dr. Yaakov Gaviria), due after 10 years    Creatinine 1.37   Initial Assessment:   Right flank/abdominal  tenderness   Differential Diagnosis:   AAA, ACS, mesenteric ischemia, intraabdominal abcess, colon mass, appendicitis, biliary disease, diverticulitis, gastritis, gastroenteritis, hepatitis, hernia, pancreatitis, inflammatory bowel disease, nephrolithiasis, constipation, GERD, IBS    Clinical Tests:   Lab Tests: Ordered and Reviewed  The following lab test(s) were unremarkable: CBC and Urinalysis       <> Summary of Lab: Stable creatinine   Radiological Study: Ordered and Reviewed  ED Management:  Bentyl IM for pain   See ED course   Other:   I have discussed this case with another health care provider.        Scribe Attestation:   Scribe #1: I performed the above scribed service and the documentation accurately describes the services I performed. I attest to the accuracy of the note.    Attending Attestation:           Physician Attestation for Scribe:  Physician Attestation Statement for Scribe #1: I, Astre Sewell, DO, reviewed documentation, as scribed by Meera Miner in my presence, and it is both accurate and complete.           ED Course as of 12/19/22 1119   Mon Dec 19, 2022   0903 Discussed results with patient and his family member at bedside.  His pain is improved now.  He is aware that his kidney function had increased after taking large doses of NSAIDs earlier in the year for neck and back surgery.  He has stopped taking ibuprofen and has only been taking Tylenol for pain.  We discussed he may need a few doses of NSAIDs which I will prescribe him due to this pain but we will do no longer than 5 days and he will not take any other NSAIDs.  I will also prescribe him narcotics for breakthrough pain. [KM]   2864 Paging urology  [KM]   1100 Spoke with Dr Wei who agrees with plan- send home with pain control, follow up in clinic, return to ED for fever  [KM]   110 Discussed treatment plan with patient he is agreeable.  He took Percocet after his last neck surgery and did okay with that but it made  him nauseated.  I will prescribe him this as well as some Zofran. [KM]      ED Course User Index  [KM] Aster Sewell MD                 Clinical Impression:   Final diagnoses:  [N20.1] Right ureteral stone (Primary)        ED Disposition Condition    Discharge Stable          ED Prescriptions       Medication Sig Dispense Start Date End Date Auth. Provider    ketorolac (TORADOL) 10 mg tablet Take 1 tablet (10 mg total) by mouth every 6 (six) hours as needed for Pain. Do not take any other NSAIDs 15 tablet 12/19/2022 12/24/2022 Aster Sewell MD    ondansetron (ZOFRAN-ODT) 4 MG TbDL Take 1 tablet (4 mg total) by mouth every 6 (six) hours as needed (nausea/vomiting). 30 tablet 12/19/2022 -- Aster Sewell MD    tamsulosin (FLOMAX) 0.4 mg Cap Take 1 capsule (0.4 mg total) by mouth once daily. for 14 days 14 capsule 12/19/2022 1/2/2023 Aster Sewell MD    oxyCODONE-acetaminophen (PERCOCET)  mg per tablet Take 1 tablet by mouth every 6 (six) hours as needed for Pain. 15 tablet 12/19/2022 12/24/2022 sAter Sewell MD          Follow-up Information       Follow up With Specialties Details Why Contact Info    [facility] Emergency Medicine  As needed, If symptoms worsen, or if you run fever [address] [facility] [place] [address] [phone]    Ramirez Wei MD Urology Call  to schedule an appt. tell them you were in ER today and I talked to Dr Wei [address] [facility] [address]  [phone]               Aster Sewell MD  12/19/22 1490

## 2022-12-19 NOTE — DISCHARGE INSTRUCTIONS
Thanks for letting us take care of you today!  It is our goal to give you courteous care and to keep you comfortable and informed, if you have any questions before you leave I will be happy to try and answer them.    Here is some advice after your visit:      Your visit in the emergency department is NOT definitive care - please follow-up with your primary care doctor and/or specialist within 1-2 days.  Please return if you have any worsening in your condition or if you have any other concerns.    If you had radiology exams like an XRAY or CT in the emergency Department the interpreation on them may be preliminary - there may be less time sensitive findings on the reports please obtain these reports within 24 hours from the hospital or by using your out on your mobile phone to access records.  Bring these to your primary care doctor and/or specialist for further review of incidental findings.    Please review any LAB WORK from your visit today with your primary care physician.    If you were prescribed OPIATE PAIN MEDICATION - please understand of these medications can be addictive, you may fill less of the prescription was written for, you do not have to take the full prescription.  You may discard what you do not use.  Please seek help if you feel you are having problems with addiction.  Do not drive or operate heavy machinery if you are taking sedating medications.  Do not mix these medications with alcohol.

## 2022-12-21 ENCOUNTER — HOSPITAL ENCOUNTER (OUTPATIENT)
Facility: HOSPITAL | Age: 52
Discharge: HOME | End: 2022-12-22
Attending: EMERGENCY MEDICINE | Admitting: PHYSICIAN ASSISTANT
Payer: COMMERCIAL

## 2022-12-21 ENCOUNTER — ANESTHESIA EVENT (OUTPATIENT)
Dept: SURGERY | Facility: HOSPITAL | Age: 52
End: 2022-12-21
Payer: COMMERCIAL

## 2022-12-21 ENCOUNTER — ANESTHESIA (OUTPATIENT)
Dept: SURGERY | Facility: HOSPITAL | Age: 52
End: 2022-12-21
Payer: COMMERCIAL

## 2022-12-21 DIAGNOSIS — N20.1 URETEROLITHIASIS: ICD-10-CM

## 2022-12-21 DIAGNOSIS — R07.9 CHEST PAIN: ICD-10-CM

## 2022-12-21 DIAGNOSIS — R10.9 ABDOMINAL PAIN: ICD-10-CM

## 2022-12-21 LAB
ALBUMIN SERPL-MCNC: 4.1 G/DL (ref 3.5–5)
ALBUMIN/GLOB SERPL: 1.4 RATIO (ref 1.1–2)
ALP SERPL-CCNC: 88 UNIT/L (ref 40–150)
ALT SERPL-CCNC: 20 UNIT/L (ref 0–55)
APPEARANCE UR: CLEAR
AST SERPL-CCNC: 19 UNIT/L (ref 5–34)
BACTERIA #/AREA URNS AUTO: ABNORMAL /HPF
BASOPHILS # BLD AUTO: 0.05 X10(3)/MCL (ref 0–0.2)
BASOPHILS NFR BLD AUTO: 1 %
BILIRUB UR QL STRIP.AUTO: NEGATIVE MG/DL
BILIRUBIN DIRECT+TOT PNL SERPL-MCNC: 1 MG/DL
BUN SERPL-MCNC: 20.9 MG/DL (ref 8.4–25.7)
CALCIUM SERPL-MCNC: 9.3 MG/DL (ref 8.4–10.2)
CHLORIDE SERPL-SCNC: 105 MMOL/L (ref 98–107)
CO2 SERPL-SCNC: 30 MMOL/L (ref 22–29)
COLOR UR AUTO: YELLOW
CREAT SERPL-MCNC: 1.31 MG/DL (ref 0.73–1.18)
EOSINOPHIL # BLD AUTO: 0.15 X10(3)/MCL (ref 0–0.9)
EOSINOPHIL NFR BLD AUTO: 3.1 %
ERYTHROCYTE [DISTWIDTH] IN BLOOD BY AUTOMATED COUNT: 12.1 % (ref 11.6–14.4)
GFR SERPLBLD CREATININE-BSD FMLA CKD-EPI: >60 MLS/MIN/1.73/M2
GLOBULIN SER-MCNC: 2.9 GM/DL (ref 2.4–3.5)
GLUCOSE SERPL-MCNC: 111 MG/DL (ref 74–100)
GLUCOSE UR QL STRIP.AUTO: NEGATIVE MG/DL
HCT VFR BLD AUTO: 45.5 % (ref 42–52)
HGB BLD-MCNC: 15.4 GM/DL (ref 14–18)
IMM GRANULOCYTES # BLD AUTO: 0.01 X10(3)/MCL (ref 0–0.04)
IMM GRANULOCYTES NFR BLD AUTO: 0.2 %
KETONES UR QL STRIP.AUTO: NEGATIVE MG/DL
LEUKOCYTE ESTERASE UR QL STRIP.AUTO: NEGATIVE UNIT/L
LYMPHOCYTES # BLD AUTO: 1.15 X10(3)/MCL (ref 0.6–4.6)
LYMPHOCYTES NFR BLD AUTO: 23.7 %
MCH RBC QN AUTO: 30.1 PG
MCHC RBC AUTO-ENTMCNC: 33.8 MG/DL (ref 33–36)
MCV RBC AUTO: 88.9 FL (ref 80–94)
MONOCYTES # BLD AUTO: 0.46 X10(3)/MCL (ref 0.1–1.3)
MONOCYTES NFR BLD AUTO: 9.5 %
NEUTROPHILS # BLD AUTO: 3.04 X10(3)/MCL (ref 2.1–9.2)
NEUTROPHILS NFR BLD AUTO: 62.5 %
NITRITE UR QL STRIP.AUTO: NEGATIVE
NRBC BLD AUTO-RTO: 0 % (ref 0–1)
PH UR STRIP.AUTO: 7.5 [PH]
PLATELET # BLD AUTO: 221 X10(3)/MCL (ref 140–371)
PMV BLD AUTO: 9.8 FL (ref 9.4–12.4)
POTASSIUM SERPL-SCNC: 4.8 MMOL/L (ref 3.5–5.1)
PROT SERPL-MCNC: 7 GM/DL (ref 6.4–8.3)
PROT UR QL STRIP.AUTO: ABNORMAL MG/DL
RBC # BLD AUTO: 5.12 X10(6)/MCL (ref 4.7–6.1)
RBC #/AREA URNS AUTO: 410 /HPF
RBC UR QL AUTO: ABNORMAL UNIT/L
SODIUM SERPL-SCNC: 142 MMOL/L (ref 136–145)
SP GR UR STRIP.AUTO: 1.02 (ref 1–1.03)
SQUAMOUS #/AREA URNS AUTO: <5 /HPF
UROBILINOGEN UR STRIP-ACNC: 1 MG/DL
WBC # SPEC AUTO: 4.9 X10(3)/MCL (ref 4.5–11.5)
WBC #/AREA URNS AUTO: <5 /HPF

## 2022-12-21 PROCEDURE — 71000033 HC RECOVERY, INTIAL HOUR: Performed by: UROLOGY

## 2022-12-21 PROCEDURE — C1758 CATHETER, URETERAL: HCPCS | Performed by: UROLOGY

## 2022-12-21 PROCEDURE — 96372 THER/PROPH/DIAG INJ SC/IM: CPT | Performed by: INTERNAL MEDICINE

## 2022-12-21 PROCEDURE — 63600175 PHARM REV CODE 636 W HCPCS: Performed by: NURSE PRACTITIONER

## 2022-12-21 PROCEDURE — 25000003 PHARM REV CODE 250: Performed by: NURSE PRACTITIONER

## 2022-12-21 PROCEDURE — 63600175 PHARM REV CODE 636 W HCPCS: Performed by: INTERNAL MEDICINE

## 2022-12-21 PROCEDURE — 63600175 PHARM REV CODE 636 W HCPCS

## 2022-12-21 PROCEDURE — 80053 COMPREHEN METABOLIC PANEL: CPT | Performed by: EMERGENCY MEDICINE

## 2022-12-21 PROCEDURE — 63600175 PHARM REV CODE 636 W HCPCS: Performed by: PHYSICIAN ASSISTANT

## 2022-12-21 PROCEDURE — 96375 TX/PRO/DX INJ NEW DRUG ADDON: CPT

## 2022-12-21 PROCEDURE — 36000707: Performed by: UROLOGY

## 2022-12-21 PROCEDURE — 36000706: Performed by: UROLOGY

## 2022-12-21 PROCEDURE — 81003 URINALYSIS AUTO W/O SCOPE: CPT | Performed by: EMERGENCY MEDICINE

## 2022-12-21 PROCEDURE — G0378 HOSPITAL OBSERVATION PER HR: HCPCS

## 2022-12-21 PROCEDURE — 25000003 PHARM REV CODE 250: Performed by: NURSE ANESTHETIST, CERTIFIED REGISTERED

## 2022-12-21 PROCEDURE — 99285 EMERGENCY DEPT VISIT HI MDM: CPT | Mod: 25

## 2022-12-21 PROCEDURE — C2617 STENT, NON-COR, TEM W/O DEL: HCPCS | Performed by: UROLOGY

## 2022-12-21 PROCEDURE — 96374 THER/PROPH/DIAG INJ IV PUSH: CPT

## 2022-12-21 PROCEDURE — 37000008 HC ANESTHESIA 1ST 15 MINUTES: Performed by: UROLOGY

## 2022-12-21 PROCEDURE — 63600175 PHARM REV CODE 636 W HCPCS: Performed by: NURSE ANESTHETIST, CERTIFIED REGISTERED

## 2022-12-21 PROCEDURE — 85025 COMPLETE CBC W/AUTO DIFF WBC: CPT | Performed by: EMERGENCY MEDICINE

## 2022-12-21 PROCEDURE — 96376 TX/PRO/DX INJ SAME DRUG ADON: CPT

## 2022-12-21 PROCEDURE — C1769 GUIDE WIRE: HCPCS | Performed by: UROLOGY

## 2022-12-21 PROCEDURE — 37000009 HC ANESTHESIA EA ADD 15 MINS: Performed by: UROLOGY

## 2022-12-21 DEVICE — STENT SET URETERAL 6X26CM: Type: IMPLANTABLE DEVICE | Site: URETER | Status: FUNCTIONAL

## 2022-12-21 RX ORDER — IBUPROFEN 200 MG
16 TABLET ORAL
Status: DISCONTINUED | OUTPATIENT
Start: 2022-12-21 | End: 2022-12-22 | Stop reason: HOSPADM

## 2022-12-21 RX ORDER — ACETAMINOPHEN 10 MG/ML
INJECTION, SOLUTION INTRAVENOUS
Status: DISCONTINUED | OUTPATIENT
Start: 2022-12-21 | End: 2022-12-21

## 2022-12-21 RX ORDER — GLYCOPYRROLATE 0.2 MG/ML
INJECTION INTRAMUSCULAR; INTRAVENOUS
Status: DISCONTINUED | OUTPATIENT
Start: 2022-12-21 | End: 2022-12-21

## 2022-12-21 RX ORDER — ONDANSETRON 2 MG/ML
INJECTION INTRAMUSCULAR; INTRAVENOUS
Status: COMPLETED
Start: 2022-12-21 | End: 2022-12-21

## 2022-12-21 RX ORDER — ONDANSETRON 2 MG/ML
4 INJECTION INTRAMUSCULAR; INTRAVENOUS EVERY 6 HOURS PRN
Status: DISCONTINUED | OUTPATIENT
Start: 2022-12-21 | End: 2022-12-22 | Stop reason: HOSPADM

## 2022-12-21 RX ORDER — ONDANSETRON 2 MG/ML
INJECTION INTRAMUSCULAR; INTRAVENOUS
Status: DISPENSED
Start: 2022-12-21 | End: 2022-12-22

## 2022-12-21 RX ORDER — LIDOCAINE HCL/PF 100 MG/5ML
SYRINGE (ML) INTRAVENOUS
Status: DISCONTINUED | OUTPATIENT
Start: 2022-12-21 | End: 2022-12-21

## 2022-12-21 RX ORDER — ONDANSETRON 2 MG/ML
4 INJECTION INTRAMUSCULAR; INTRAVENOUS
Status: COMPLETED | OUTPATIENT
Start: 2022-12-21 | End: 2022-12-21

## 2022-12-21 RX ORDER — HYDROMORPHONE HYDROCHLORIDE 2 MG/ML
1 INJECTION, SOLUTION INTRAMUSCULAR; INTRAVENOUS; SUBCUTANEOUS
Status: COMPLETED | OUTPATIENT
Start: 2022-12-21 | End: 2022-12-21

## 2022-12-21 RX ORDER — HYDRALAZINE HYDROCHLORIDE 20 MG/ML
10 INJECTION INTRAMUSCULAR; INTRAVENOUS EVERY 4 HOURS PRN
Status: DISCONTINUED | OUTPATIENT
Start: 2022-12-21 | End: 2022-12-22 | Stop reason: HOSPADM

## 2022-12-21 RX ORDER — FENTANYL CITRATE 50 UG/ML
INJECTION, SOLUTION INTRAMUSCULAR; INTRAVENOUS
Status: DISCONTINUED | OUTPATIENT
Start: 2022-12-21 | End: 2022-12-21

## 2022-12-21 RX ORDER — ENOXAPARIN SODIUM 100 MG/ML
40 INJECTION SUBCUTANEOUS EVERY 24 HOURS
Status: DISCONTINUED | OUTPATIENT
Start: 2022-12-22 | End: 2022-12-22 | Stop reason: HOSPADM

## 2022-12-21 RX ORDER — SODIUM CHLORIDE 0.9 % (FLUSH) 0.9 %
10 SYRINGE (ML) INJECTION EVERY 12 HOURS PRN
Status: DISCONTINUED | OUTPATIENT
Start: 2022-12-21 | End: 2022-12-22 | Stop reason: HOSPADM

## 2022-12-21 RX ORDER — IBUPROFEN 200 MG
24 TABLET ORAL
Status: DISCONTINUED | OUTPATIENT
Start: 2022-12-21 | End: 2022-12-22 | Stop reason: HOSPADM

## 2022-12-21 RX ORDER — PROMETHAZINE HYDROCHLORIDE 25 MG/ML
25 INJECTION, SOLUTION INTRAMUSCULAR; INTRAVENOUS EVERY 6 HOURS PRN
Status: DISCONTINUED | OUTPATIENT
Start: 2022-12-21 | End: 2022-12-22 | Stop reason: HOSPADM

## 2022-12-21 RX ORDER — ONDANSETRON 2 MG/ML
INJECTION INTRAMUSCULAR; INTRAVENOUS
Status: DISCONTINUED
Start: 2022-12-21 | End: 2022-12-21 | Stop reason: WASHOUT

## 2022-12-21 RX ORDER — PROPOFOL 10 MG/ML
INJECTION, EMULSION INTRAVENOUS
Status: DISCONTINUED | OUTPATIENT
Start: 2022-12-21 | End: 2022-12-21

## 2022-12-21 RX ORDER — DEXAMETHASONE SODIUM PHOSPHATE 4 MG/ML
INJECTION, SOLUTION INTRA-ARTICULAR; INTRALESIONAL; INTRAMUSCULAR; INTRAVENOUS; SOFT TISSUE
Status: DISCONTINUED | OUTPATIENT
Start: 2022-12-21 | End: 2022-12-21

## 2022-12-21 RX ORDER — SODIUM CHLORIDE 0.9 % (FLUSH) 0.9 %
10 SYRINGE (ML) INJECTION
Status: DISCONTINUED | OUTPATIENT
Start: 2022-12-21 | End: 2022-12-21

## 2022-12-21 RX ORDER — SUCCINYLCHOLINE CHLORIDE 20 MG/ML
INJECTION INTRAMUSCULAR; INTRAVENOUS
Status: DISCONTINUED | OUTPATIENT
Start: 2022-12-21 | End: 2022-12-21

## 2022-12-21 RX ORDER — HYDROMORPHONE HYDROCHLORIDE 2 MG/ML
1 INJECTION, SOLUTION INTRAMUSCULAR; INTRAVENOUS; SUBCUTANEOUS EVERY 4 HOURS PRN
Status: DISCONTINUED | OUTPATIENT
Start: 2022-12-21 | End: 2022-12-22 | Stop reason: HOSPADM

## 2022-12-21 RX ORDER — MIDAZOLAM HYDROCHLORIDE 1 MG/ML
INJECTION INTRAMUSCULAR; INTRAVENOUS
Status: DISCONTINUED | OUTPATIENT
Start: 2022-12-21 | End: 2022-12-21

## 2022-12-21 RX ORDER — NALOXONE HCL 0.4 MG/ML
0.02 VIAL (ML) INJECTION
Status: DISCONTINUED | OUTPATIENT
Start: 2022-12-21 | End: 2022-12-22 | Stop reason: HOSPADM

## 2022-12-21 RX ORDER — OXYCODONE HYDROCHLORIDE 5 MG/1
5 TABLET ORAL EVERY 6 HOURS PRN
Status: DISCONTINUED | OUTPATIENT
Start: 2022-12-21 | End: 2022-12-22 | Stop reason: HOSPADM

## 2022-12-21 RX ORDER — ACETAMINOPHEN 325 MG/1
650 TABLET ORAL EVERY 4 HOURS PRN
Status: DISCONTINUED | OUTPATIENT
Start: 2022-12-21 | End: 2022-12-22 | Stop reason: HOSPADM

## 2022-12-21 RX ORDER — CEFTRIAXONE 250 MG/1
INJECTION, POWDER, FOR SOLUTION INTRAMUSCULAR; INTRAVENOUS
Status: DISCONTINUED | OUTPATIENT
Start: 2022-12-21 | End: 2022-12-21

## 2022-12-21 RX ORDER — GLUCAGON 1 MG
1 KIT INJECTION
Status: DISCONTINUED | OUTPATIENT
Start: 2022-12-21 | End: 2022-12-22 | Stop reason: HOSPADM

## 2022-12-21 RX ORDER — TALC
6 POWDER (GRAM) TOPICAL NIGHTLY PRN
Status: DISCONTINUED | OUTPATIENT
Start: 2022-12-21 | End: 2022-12-22 | Stop reason: HOSPADM

## 2022-12-21 RX ADMIN — PROPOFOL 150 MG: 10 INJECTION, EMULSION INTRAVENOUS at 08:12

## 2022-12-21 RX ADMIN — MIDAZOLAM HYDROCHLORIDE 2 MG: 1 INJECTION, SOLUTION INTRAMUSCULAR; INTRAVENOUS at 07:12

## 2022-12-21 RX ADMIN — LIDOCAINE HYDROCHLORIDE 80 MG: 20 INJECTION, SOLUTION INTRAVENOUS at 08:12

## 2022-12-21 RX ADMIN — ACETAMINOPHEN 1000 MG: 10 INJECTION, SOLUTION INTRAVENOUS at 08:12

## 2022-12-21 RX ADMIN — DEXAMETHASONE SODIUM PHOSPHATE 8 MG: 4 INJECTION, SOLUTION INTRA-ARTICULAR; INTRALESIONAL; INTRAMUSCULAR; INTRAVENOUS; SOFT TISSUE at 08:12

## 2022-12-21 RX ADMIN — PROMETHAZINE HYDROCHLORIDE 25 MG: 25 INJECTION INTRAMUSCULAR; INTRAVENOUS at 02:12

## 2022-12-21 RX ADMIN — ONDANSETRON 4 MG: 2 INJECTION INTRAMUSCULAR; INTRAVENOUS at 12:12

## 2022-12-21 RX ADMIN — FENTANYL CITRATE 100 MCG: 50 INJECTION, SOLUTION INTRAMUSCULAR; INTRAVENOUS at 08:12

## 2022-12-21 RX ADMIN — OXYCODONE 5 MG: 5 TABLET ORAL at 09:12

## 2022-12-21 RX ADMIN — ONDANSETRON 4 MG: 2 INJECTION INTRAMUSCULAR; INTRAVENOUS at 07:12

## 2022-12-21 RX ADMIN — SUCCINYLCHOLINE CHLORIDE 140 MG: 20 INJECTION, SOLUTION INTRAMUSCULAR; INTRAVENOUS at 08:12

## 2022-12-21 RX ADMIN — GLYCOPYRROLATE 0.2 MG: 0.2 INJECTION INTRAMUSCULAR; INTRAVENOUS at 07:12

## 2022-12-21 RX ADMIN — HYDROMORPHONE HYDROCHLORIDE 1 MG: 2 INJECTION, SOLUTION INTRAMUSCULAR; INTRAVENOUS; SUBCUTANEOUS at 12:12

## 2022-12-21 RX ADMIN — CEFTRIAXONE SODIUM 1 G: 250 INJECTION, POWDER, FOR SOLUTION INTRAMUSCULAR; INTRAVENOUS at 08:12

## 2022-12-21 RX ADMIN — SODIUM CHLORIDE, SODIUM GLUCONATE, SODIUM ACETATE, POTASSIUM CHLORIDE AND MAGNESIUM CHLORIDE: 526; 502; 368; 37; 30 INJECTION, SOLUTION INTRAVENOUS at 07:12

## 2022-12-21 NOTE — CONSULTS
Eb Alfaro 1970  [id number]  12/21/2022    CONSULTING PHYSICIAN: Velvet    Reason for consult: Renal calculi    HPI: Mr. Alfaro is a 52-year-old male with minimal past medical history presented to ER with complaints of left-sided flank pain radiating to the front of his abdomen and extending into his groin.  Patient reports being seen at this facility a few days ago for right-sided flank pain but was diagnosed with a 6 x 9 mm left ureteral stone, his pain was controlled and he was sent home with pain medication and was supposed to follow-up with us next week for definitive stone treatment.  However, he reports getting dressed this morning the pain continued to get worse and he had associated decreased urine output, nausea and chills.  Denies fever, vomiting, gross hematuria or dysuria.  He is no known past medical history of kidney stones.  Labs on arrival; WBC 4.9, H&H 15.4 and 45.5, BUN and creatinine 20.9 & 1.31, UA SHOWS CLEAR, YELLOW URINE, NEGATIVE NITRITES, 410 rbc'S, <5 wbc'S AND NO BACTERIA SEEN. Abd/pelvic CT shows a 6 x 9 proximal left ureteral stone with minimal upstream collecting system dilation.      Past Medical History:   Diagnosis Date    Digestive disorder     Hypertension     Thyroid disease      Past Surgical History:   Procedure Laterality Date    APPENDECTOMY      Bilateral L4-5, L5-S1 decompression; right L5-S1 microdiscectomy  03/08/2022    Dr. Rodriguez    C3-5 laminectomies, C2-C6 posterior instrumented fusion  03/08/2022    Dr. Rodriguez    CHOLECYSTECTOMY      colonocscopy      LEFT HEART CATHETERIZATION  10/16/2017    PARATHYROIDECTOMY N/A 05/06/2022    Procedure: PARATHYROIDECTOMY;  Surgeon: Ajay Blum MD;  Location: [facility];  Service: ENT;  Laterality: N/A;  PARATHYROIDECTOMY // NERVE MONITOR // FROZEN SECTION // INTRA OP PTH // SUPINE     Family History   Problem Relation Age of Onset    Cancer Mother         meningiomas    Cancer Father         colon     Social  History     Tobacco Use    Smoking status: Former    Smokeless tobacco: Never   Substance Use Topics    Alcohol use: Yes     Alcohol/week: 2.0 standard drinks     Types: 2 Standard drinks or equivalent per week    Drug use: No     Current Facility-Administered Medications   Medication Dose Route Frequency Provider Last Rate Last Admin    acetaminophen tablet 650 mg  650 mg Oral Q4H PRN Bridget Salcedo, FNP        dextrose 10% bolus 125 mL 125 mL  12.5 g Intravenous PRN Bridget Salcedo, FNP        dextrose 10% bolus 250 mL 250 mL  25 g Intravenous PRN Bridget Salcedo, FNP        [START ON 12/22/2022] enoxaparin injection 40 mg  40 mg Subcutaneous Daily Bridget VASYL Matias, FNP        glucagon (human recombinant) injection 1 mg  1 mg Intramuscular PRN Bridget Salcedo, FNP        glucose chewable tablet 16 g  16 g Oral PRN Bridget Salcedo, FNP        glucose chewable tablet 24 g  24 g Oral PRN Bridget Salcedo, FNP        hydrALAZINE injection 10 mg  10 mg Intravenous Q4H PRN Bridget Salcedo, FNP        HYDROmorphone (PF) injection 1 mg  1 mg Intravenous Q4H PRN Bridget Salcedo, FNP        melatonin tablet 6 mg  6 mg Oral Nightly PRN Bridget Salcedo, FNP        naloxone 0.4 mg/mL injection 0.02 mg  0.02 mg Intravenous PRN Bridget Salcedo, FNP        ondansetron injection 4 mg  4 mg Intravenous Q6H PRN Bridget Salcedo, FNP        oxyCODONE immediate release tablet 5 mg  5 mg Oral Q6H PRN Bridget Salcedo, FNP        promethazine injection 25 mg  25 mg Intramuscular Q6H PRN Jamaal Verdin MD   25 mg at 12/21/22 1421    sodium chloride 0.9% flush 10 mL  10 mL Intravenous Q12H PRN Bridget Salcedo, NANCYP         Current Outpatient Medications   Medication Sig Dispense Refill    cholecalciferol, vitamin D3, (VITAMIN D3) 50 mcg (2,000 unit) Cap capsule Take by mouth once daily.      cyclobenzaprine (FLEXERIL) 10 MG tablet TAKE 1 TABLET BY MOUTH THREE  TIMES DAILY AS NEEDED FOR SPASM      gabapentin (NEURONTIN) 300 MG capsule Take 1 capsule (300 mg total) by mouth 3 (three) times daily. 90 capsule 5    ibuprofen (ADVIL,MOTRIN) 200 MG tablet Take 400 mg by mouth.      ketorolac (TORADOL) 10 mg tablet Take 1 tablet (10 mg total) by mouth every 6 (six) hours as needed for Pain. Do not take any other NSAIDs 15 tablet 0    olmesartan (BENICAR) 5 MG Tab Take 2 tablets (10 mg total) by mouth 2 (two) times daily. 60 tablet 3    ondansetron (ZOFRAN-ODT) 4 MG TbDL Take 1 tablet (4 mg total) by mouth every 6 (six) hours as needed (nausea/vomiting). 30 tablet 0    orphenadrine (NORFLEX) 100 mg tablet Take 1 tablet (100 mg total) by mouth 2 (two) times daily. 60 tablet 2    oxyCODONE-acetaminophen (PERCOCET)  mg per tablet Take 1 tablet by mouth every 6 (six) hours as needed for Pain. 15 tablet 0    rosuvastatin (CRESTOR) 10 MG tablet Take 1 tablet (10 mg total) by mouth every evening. 90 tablet 3    tamsulosin (FLOMAX) 0.4 mg Cap Take 1 capsule (0.4 mg total) by mouth once daily. for 14 days 14 capsule 0     Review of patient's allergies indicates:   Allergen Reactions    Cefdinir Anaphylaxis    Penicillins Anaphylaxis    Hydrocodone-acetaminophen Itching    Morphine Hives    Opioids - morphine analogues Hives     ROS: 12 point review of systems negative other than the HPI    PHYSICAL EXAM:  Vitals:    12/21/22 0848 12/21/22 1140 12/21/22 1220 12/21/22 1400   BP: (!) 143/105 (!) 153/78  129/75   Patient Position:    Lying   Pulse: 79 87  73   Resp: 18 20 20 18   Temp: 98.2 °F (36.8 °C) 98.2 °F (36.8 °C)     TempSrc: Oral Oral     SpO2: 100%   97%   Weight: 104.3 kg (230 lb)      Height: 6' (1.829 m)          Intake/Output Summary (Last 24 hours) at 12/21/2022 1540  Last data filed at 12/21/2022 1421  Gross per 24 hour   Intake 1 ml   Output --   Net 1 ml       GEN: WN/WD NAD  HEENT: NCAT, PERRLA, EOMI, OP clear, nares patent  CV: RRR  RESP: Even and unlabored  ABD:   Soft, nontender, nondistended  :  Mild left CVA tenderness  EXT: no C/C/E  NEURO: no focal deficits, MAEW, AAOx4      LABS:  Recent Results (from the past 24 hour(s))   Comp. Metabolic Panel    Collection Time: 12/21/22  8:57 AM   Result Value Ref Range    Sodium Level 142 136 - 145 mmol/L    Potassium Level 4.8 3.5 - 5.1 mmol/L    Chloride 105 98 - 107 mmol/L    Carbon Dioxide 30 (H) 22 - 29 mmol/L    Glucose Level 111 (H) 74 - 100 mg/dL    Blood Urea Nitrogen 20.9 8.4 - 25.7 mg/dL    Creatinine 1.31 (H) 0.73 - 1.18 mg/dL    Calcium Level Total 9.3 8.4 - 10.2 mg/dL    Protein Total 7.0 6.4 - 8.3 gm/dL    Albumin Level 4.1 3.5 - 5.0 g/dL    Globulin 2.9 2.4 - 3.5 gm/dL    Albumin/Globulin Ratio 1.4 1.1 - 2.0 ratio    Bilirubin Total 1.0 <=1.5 mg/dL    Alkaline Phosphatase 88 40 - 150 unit/L    Alanine Aminotransferase 20 0 - 55 unit/L    Aspartate Aminotransferase 19 5 - 34 unit/L    eGFR >60 mls/min/1.73/m2   CBC with Differential    Collection Time: 12/21/22  8:57 AM   Result Value Ref Range    WBC 4.9 4.5 - 11.5 x10(3)/mcL    RBC 5.12 4.70 - 6.10 x10(6)/mcL    Hgb 15.4 14.0 - 18.0 gm/dL    Hct 45.5 42.0 - 52.0 %    MCV 88.9 80.0 - 94.0 fL    MCH 30.1 pg    MCHC 33.8 33.0 - 36.0 mg/dL    RDW 12.1 11.6 - 14.4 %    Platelet 221 140 - 371 x10(3)/mcL    MPV 9.8 9.4 - 12.4 fL    Neut % 62.5 %    Lymph % 23.7 %    Mono % 9.5 %    Eos % 3.1 %    Basophil % 1.0 %    Lymph # 1.15 0.6 - 4.6 x10(3)/mcL    Neut # 3.04 2.1 - 9.2 x10(3)/mcL    Mono # 0.46 0.1 - 1.3 x10(3)/mcL    Eos # 0.15 0 - 0.9 x10(3)/mcL    Baso # 0.05 0 - 0.2 x10(3)/mcL    IG# 0.01 0 - 0.04 x10(3)/mcL    IG% 0.2 %    NRBC% 0.0 0 - 1 %   Urinalysis, Reflex to Urine Culture Urine, Clean Catch    Collection Time: 12/21/22  9:15 AM    Specimen: Urine   Result Value Ref Range    Color, UA Yellow Yellow, Light-Yellow, Dark Yellow, Davida, Straw    Appearance, UA Clear Clear    Specific Gravity, UA 1.023 1.001 - 1.030    pH, UA 7.5 5.0 - 8.5    Protein, UA 1+  (A) Negative mg/dL    Glucose, UA Negative Negative, Normal mg/dL    Ketones, UA Negative Negative mg/dL    Blood, UA 3+ (A) Negative unit/L    Bilirubin, UA Negative Negative mg/dL    Urobilinogen, UA 1.0 0.2, 1.0, Normal mg/dL    Nitrites, UA Negative Negative    Leukocyte Esterase, UA Negative Negative unit/L   Urinalysis, Microscopic    Collection Time: 12/21/22  9:15 AM   Result Value Ref Range    RBC,  (H) <=5 /HPF    WBC, UA <5 <=5 /HPF    Squamous Epithelial Cells, UA <5 <=5 /HPF    Bacteria, UA None Seen None Seen, Rare, Occasional /HPF         IMAGING:  EXAMINATION:  CT ABDOMEN PELVIS WITH CONTRAST     CLINICAL HISTORY:  RLQ abdominal pain (Age >= 14y);     TECHNIQUE:  CT imaging of the abdomen and pelvis after the administration of 100 mL of Isovue 370 intravenous contrast.  Oral contrast not administered. Dose length product 852 mGycm. Automatic exposure control, adjustment of mA/kV or iterative reconstruction technique used to limit radiation dose.     COMPARISON:  Chest CT 12/08/2022     FINDINGS:  Liver/biliary: Subcentimeter hypodensity in the right liver too small to fully assess, but statistically benign, likely tiny cyst.  Gallbladder not seen.  No biliary dilatation.     Pancreas: Normal.     Spleen: Normal.     Adrenals: Normal.     Genitourinary: A stone in the proximal left ureter measures 6 mm in transverse diameter and 9 mm in length.  Minimal upstream collecting system dilatation.  Bladder within normal limits. Prostate size upper normal.     Stomach/bowel: No evidence of bowel obstruction. Normal appendix. No definitive sites of bowel inflammation.     Lymph nodes/peritoneum: No pathologically enlarged lymph node identified. No ascites or free air. No fluid collection.     Vasculature: Mild aortic and iliac artery calcification.  No abdominal aortic aneurysm.     Abdominal wall: Normal.     Lung bases: No consolidation or pleural effusion.  4 mm solid nodule in the right middle lobe  unchanged from recent chest CT.     Musculoskeletal: No acute osseous findings.     Impression:  Proximal left ureteral stone producing minimal upstream left collecting system dilatation.  No other acute process identified.         ASSESSMENT:  6x9 mm proximal left ureteral stone      PLAN:  Discussed diagnosis, treatment options, risks and benefits.  Patient is agreeable forward with cystoscopy, possible left ureteroscopy with laser litho stone extraction and left ureteral stent placement later today.  Informed consent obtained.  Discussed normal postoperative findings with ureteral stent in place.    Liliya Pak, ADELE

## 2022-12-21 NOTE — ED PROVIDER NOTES
Encounter Date: 12/21/2022       History     Chief Complaint   Patient presents with    Flank Pain     Complaining of left flank pain radiating into groin since this morning; states here 2 days ago and dx with kidney stone      52 year old wm presents with complaints of worsening left and mid flank pain this morning. Patient was seen 2 days ago in this facility and diagnosed with a 6 x 9 mm kidney stone in the left ureter.  At the time Dr. Wei was consulted and the patient was sent home with Percocet and Zofran for pain control.  He attempted to contact [facility] urology where they gave him an appointment for 1228.  He states that he took the last Zofran at approximately 8:00 a.m. in the last Percocet at 9:35 a.m..  Patient states that his pain is still a 8/10.  He states that he has decreased ability to void his bladder.  He denies any fever.    The history is provided by the patient. No  was used.   Abdominal Pain  The current episode started two days ago. The onset of the illness was abrupt. The problem has been gradually worsening. The abdominal pain is located in the LLQ. The abdominal pain radiates to the RLQ. The severity of the abdominal pain is 8/10. The abdominal pain is relieved by certain positions. The other symptoms of the illness include vomiting. The other symptoms of the illness do not include fever, shortness of breath or dysuria.   Additional symptoms associated with the illness include frequency.   Review of patient's allergies indicates:   Allergen Reactions    Cefdinir Anaphylaxis    Penicillins Anaphylaxis    Hydrocodone-acetaminophen Itching    Morphine Hives    Opioids - morphine analogues Hives     Past Medical History:   Diagnosis Date    Digestive disorder     Hypertension     Thyroid disease      Past Surgical History:   Procedure Laterality Date    APPENDECTOMY      Bilateral L4-5, L5-S1 decompression; right L5-S1 microdiscectomy  03/08/2022    Dr. Rodriguez     C3-5 laminectomies, C2-C6 posterior instrumented fusion  03/08/2022    Dr. Rodriguez    CHOLECYSTECTOMY      colonocscopy      LEFT HEART CATHETERIZATION  10/16/2017    PARATHYROIDECTOMY N/A 05/06/2022    Procedure: PARATHYROIDECTOMY;  Surgeon: Ajay Blum MD;  Location: [facility];  Service: ENT;  Laterality: N/A;  PARATHYROIDECTOMY // NERVE MONITOR // FROZEN SECTION // INTRA OP PTH // SUPINE     Family History   Problem Relation Age of Onset    Cancer Mother         meningiomas    Cancer Father         colon     Social History     Tobacco Use    Smoking status: Former    Smokeless tobacco: Never   Substance Use Topics    Alcohol use: Yes     Alcohol/week: 2.0 standard drinks     Types: 2 Standard drinks or equivalent per week    Drug use: No     Review of Systems   Constitutional:  Negative for activity change and fever.   Respiratory:  Negative for chest tightness and shortness of breath.    Gastrointestinal:  Positive for abdominal pain and vomiting.   Genitourinary:  Positive for flank pain and frequency. Negative for dysuria.     Physical Exam     Initial Vitals [12/21/22 0848]   BP Pulse Resp Temp SpO2   (!) 143/105 79 18 98.2 °F (36.8 °C) 100 %      MAP       --         Physical Exam    Constitutional: He appears well-developed and well-nourished.   HENT:   Head: Normocephalic.   Eyes: Pupils are equal, round, and reactive to light.   Cardiovascular:  Normal rate, regular rhythm and normal heart sounds.           Abdominal: Abdomen is soft. There is abdominal tenderness.   Right lower quadrant tenderness to palpation.  Left flank CVA tenderness     Neurological: He is alert and oriented to person, place, and time.       ED Course   Procedures  Labs Reviewed   COMPREHENSIVE METABOLIC PANEL - Abnormal; Notable for the following components:       Result Value    Carbon Dioxide 30 (*)     Glucose Level 111 (*)     Creatinine 1.31 (*)     All other components within normal limits   URINALYSIS, REFLEX TO URINE  CULTURE - Abnormal; Notable for the following components:    Protein, UA 1+ (*)     Blood, UA 3+ (*)     All other components within normal limits   URINALYSIS, MICROSCOPIC - Abnormal; Notable for the following components:    RBC,  (*)     All other components within normal limits   CBC W/ AUTO DIFFERENTIAL    Narrative:     The following orders were created for panel order CBC W/ AUTO DIFFERENTIAL.  Procedure                               Abnormality         Status                     ---------                               -----------         ------                     CBC with Differential[[id number]]                            Final result                 Please view results for these tests on the individual orders.   CBC WITH DIFFERENTIAL          Imaging Results              X-Ray Abdomen AP 1 View (KUB) (Final result)  Result time 12/21/22 11:18:49      Final result by Josefina Marquez MD (12/21/22 11:18:49)                   Impression:      No acute abnormality identified.      Electronically signed by: Josefina Marquez  Date:    12/21/2022  Time:    11:18               Narrative:    EXAMINATION:  XR ABDOMEN AP 1 VIEW    CLINICAL HISTORY:  Unspecified abdominal pain    TECHNIQUE:  AP view of the abdomen.    COMPARISON:  None.    FINDINGS:  There is a normal colonic stool volume.  There are no dilated loops of small bowel to suggest obstruction.  There are no abnormal calcifications evident.  The lung bases are clear.                                       Medications   HYDROmorphone (PF) injection 1 mg (has no administration in time range)        Additional MDM:   Differential Diagnosis:   Other: The following diagnoses were also considered and will be evaluated: Obstructive ureterolithiasis, ureterolithiasis. 1140-  spoke with Dr. Wei, urologist on-call who asked that the patient is admitted to Hospital Medicine and kept NPO for stent placement today.    1150- paged hospitalist     1230-   Spoke to Nathalia  with Hospital Medicine, the patient will be admitted to Dr. Verdin     Abdominal Pain: Medication(s) given for abdominal pain: Dilaudid.   X-Rays Done: KUB Abdomen.   CT scan done:. The CT confirms the diagnosis. Disposition: Admitted for surgery.                      Clinical Impression:   Final diagnoses:  [R10.9] Abdominal pain               Becki Antonio PA-C  12/21/22 1231       Becki Antonio PA-C  12/21/22 1304

## 2022-12-21 NOTE — H&P
[facility] Hospital Medicine History & Physical Examination       Patient Name: Eb Alfaro  MRN: [id number]  Patient Class: OP- Observation   Admission Date: 12/21/2022   Admitting Physician: CRUZITO Service   Length of Stay: 0  Attending Physician: Dr. Jamaal Verdin  Primary Care Provider: Nick Monzon MD  Face-to-Face encounter date: 12/21/2022  Code Status:Full code   Chief Complaint: Flank Pain (Complaining of left flank pain radiating into groin since this morning; states here 2 days ago and dx with kidney stone )        Patient information was obtained from patient, patient's family, past medical records and ER records.     HISTORY OF PRESENT ILLNESS:   Eb Alfaro is a 52 y.o. male who PMH includes GERD, HTN, HLD, hypothyroidism; presented to the ED at  [facility] on 12/21/2022 with a primary complaint of left flank pain radiating to his groin with associated nausea. Pt reports he was in the ED here 2 days ago, diagnosed with kidney stone and referred to urology services outpatient. Pt has appt with urology on 12/28/2022. Pt reports he has taken medication for nausea and pain which has provided little relief. He denies any nausea. Pt reports difficulty urinating with reports of pain and pressure. No reports of vomiting, fever, cough, congestion, or any sick contacts. Labs done significant for CO2 30 , creatinine 1.31; glucose 111, other indices unremarkable.  Abdominal x-ray KUB demonstrated no acute abnormality.  CT of the abdomen and pelvis with contrast done on 12/19/2022 demonstrated proximal left ureteral stone producing minimal upstream left collecting system dilatation; no other acute process identified.   Initial vital signs /105 pulse 79 respirations 18 temperature 98.2° F O2 saturation 100% on room air.  Patient received IV hydration in the ED, IV pain medication and IV antiemetic therapy which provided little relief in his symptoms.  Urology services were  consulted per ED provider with plans to take the patient to the OR today.  Patient is admitted to hospital medicine services for further management.    PAST MEDICAL HISTORY:   HTN  HLD  GERD   Chronic back pain  Hyperparathyroidism    PAST SURGICAL HISTORY:   Lumbar lamectomy  Lumbar decompression  Lumbar microdiskectomy  Cervical fusion/cervical laminectomy   Cholecystectomy  Lower endoscopy   Left heart catheterization   Parathyroidectomy     ALLERGIES:   Cefdinir, Penicillins, Hydrocodone-acetaminophen, Morphine, and Opioids - morphine analogues    FAMILY HISTORY:   Reviewed and negative    SOCIAL HISTORY:   Denies any recent tobacco use  Denies any illicit drug use  Drinks alcohol socially   Lives with family    HOME MEDICATIONS:   As documented  Prior to Admission medications    Medication Sig Start Date End Date Taking? Authorizing Provider   cholecalciferol, vitamin D3, (VITAMIN D3) 50 mcg (2,000 unit) Cap capsule Take by mouth once daily.    Historical Provider   cyclobenzaprine (FLEXERIL) 10 MG tablet TAKE 1 TABLET BY MOUTH THREE TIMES DAILY AS NEEDED FOR SPASM 4/17/22   Historical Provider   gabapentin (NEURONTIN) 300 MG capsule Take 1 capsule (300 mg total) by mouth 3 (three) times daily. 6/13/22 6/13/23  KIA Gonzales-BC   ibuprofen (ADVIL,MOTRIN) 200 MG tablet Take 400 mg by mouth. 6/17/21   Historical Provider   ketorolac (TORADOL) 10 mg tablet Take 1 tablet (10 mg total) by mouth every 6 (six) hours as needed for Pain. Do not take any other NSAIDs 12/19/22 12/24/22  Aster Sewell MD   olmesartan (BENICAR) 5 MG Tab Take 2 tablets (10 mg total) by mouth 2 (two) times daily. 12/6/22   Nick Monzon MD   ondansetron (ZOFRAN-ODT) 4 MG TbDL Take 1 tablet (4 mg total) by mouth every 6 (six) hours as needed (nausea/vomiting). 12/19/22   Aster Sewell MD   orphenadrine (NORFLEX) 100 mg tablet Take 1 tablet (100 mg total) by mouth 2 (two) times daily. 8/4/22   Marcelino Rodriguez MD    oxyCODONE-acetaminophen (PERCOCET)  mg per tablet Take 1 tablet by mouth every 6 (six) hours as needed for Pain. 12/19/22 12/24/22  Aster Sewell MD   rosuvastatin (CRESTOR) 10 MG tablet Take 1 tablet (10 mg total) by mouth every evening. 12/12/22 12/12/23  Nick Monzon MD   tamsulosin (FLOMAX) 0.4 mg Cap Take 1 capsule (0.4 mg total) by mouth once daily. for 14 days 12/19/22 1/2/23  Aster Sewell MD       REVIEW OF SYSTEMS:   Except as documented, all other systems reviewed and negative     PHYSICAL EXAM:     VITAL SIGNS: 24 HRS MIN & MAX LAST   Temp  Min: 98.2 °F (36.8 °C)  Max: 98.2 °F (36.8 °C) 98.2 °F (36.8 °C)   BP  Min: 143/105  Max: 153/78 (!) 153/78     Pulse  Min: 79  Max: 87  87   Resp  Min: 18  Max: 20 20   SpO2  Min: 100 %  Max: 100 % 100 %       General appearance: Well-developed, well-nourished, chronically ill appearing;  male complaints of abdominal pain, non-toxic  HENT: Atraumatic head. Moist mucous membranes of oral cavity.  Eyes: PERRL  Neck: Supple.   Lungs: Clear to auscultation bilaterally. No wheezing present.   Heart: Regular rate and rhythm. S1 and S2 present with no murmurs/gallop/rub. No pedal edema. No JVD present.   Abdomen: Soft, non-distended, left abdominal pain; no rebound tenderness/guarding. Bowel sounds are normal.   Extremities: BAZZI; generalized weakness  Skin: warm and dry  Neuro: AAOx3, no focal deficits  Psych/mental status: Appropriate mood and affect. Responds appropriately to questions.     LABS AND IMAGING:     Recent Labs   Lab 12/19/22  0752 12/21/22  0857   WBC 5.8 4.9   RBC 5.38 5.12   HGB 16.1 15.4   HCT 47.5 45.5   MCV 88.3 88.9   MCH 29.9 30.1   MCHC 33.9 33.8   RDW 12.3 12.1    221   MPV 9.9 9.8       Recent Labs   Lab 12/19/22  0752 12/21/22  0857    142   K 4.6 4.8   CO2 30* 30*   BUN 15.2 20.9   CREATININE 1.31* 1.31*   CALCIUM 10.0 9.3   ALBUMIN 4.5 4.1   ALKPHOS 94 88   ALT 24 20   AST 21 19   BILITOT 0.9 1.0        Microbiology Results (last 7 days)       ** No results found for the last 168 hours. **             X-Ray Abdomen AP 1 View (KUB)  Narrative: EXAMINATION:  XR ABDOMEN AP 1 VIEW    CLINICAL HISTORY:  Unspecified abdominal pain    TECHNIQUE:  AP view of the abdomen.    COMPARISON:  None.    FINDINGS:  There is a normal colonic stool volume.  There are no dilated loops of small bowel to suggest obstruction.  There are no abnormal calcifications evident.  The lung bases are clear.  Impression: No acute abnormality identified.    Electronically signed by: Josefina Marquez  Date:    12/21/2022  Time:    11:18        EXAMINATION:  CT ABDOMEN PELVIS WITH CONTRAST     CLINICAL HISTORY:  RLQ abdominal pain (Age >= 14y);     TECHNIQUE:  CT imaging of the abdomen and pelvis after the administration of 100 mL of Isovue 370 intravenous contrast.  Oral contrast not administered. Dose length product 852 mGycm. Automatic exposure control, adjustment of mA/kV or iterative reconstruction technique used to limit radiation dose.     COMPARISON:  Chest CT 12/08/2022     FINDINGS:  Liver/biliary: Subcentimeter hypodensity in the right liver too small to fully assess, but statistically benign, likely tiny cyst.  Gallbladder not seen.  No biliary dilatation.     Pancreas: Normal.     Spleen: Normal.     Adrenals: Normal.     Genitourinary: A stone in the proximal left ureter measures 6 mm in transverse diameter and 9 mm in length.  Minimal upstream collecting system dilatation.  Bladder within normal limits. Prostate size upper normal.     Stomach/bowel: No evidence of bowel obstruction. Normal appendix. No definitive sites of bowel inflammation.     Lymph nodes/peritoneum: No pathologically enlarged lymph node identified. No ascites or free air. No fluid collection.     Vasculature: Mild aortic and iliac artery calcification.  No abdominal aortic aneurysm.     Abdominal wall: Normal.     Lung bases: No consolidation or pleural  effusion.  4 mm solid nodule in the right middle lobe unchanged from recent chest CT.     Musculoskeletal: No acute osseous findings.     Impression:     Proximal left ureteral stone producing minimal upstream left collecting system dilatation.  No other acute process identified.        Electronically signed by: Mando Clemons  Date:                                            12/19/2022  Time:                                           09:23           Exam Ended: 12/19/22 09:01 Last Resulted: 12/19/22 09:23                ASSESSMENT & PLAN:   ASSESSMENT:  Acute left distal renal calculi- obstructing  Left flank pain  LEXUS  HTN- urgency  Nausea without vomiting  Hyperparathyroidism- s/p parathyroidectomy  Chronic neck and back pain- s/p fusion and laminectomies  Weakness    PLAN:  Consult Urology Services appreciate assistance and recommendations   NPO status   IV hydration   PRN pain management  PRN anti emetic therapy   Resume home medication once able to take p.o.   Accurate I&O  Repeat lab work in a.m.   Prn anti-hypertensive per parameters    VTE Prophylaxis: Lovenox start tomorrow for DVT prophylaxis and will be advised to be as mobile as possible     Patient condition:  Stable  __________________________________________________________________________  INPATIENT LIST OF MEDICATIONS     Scheduled Meds:see mar   [START ON 12/22/2022] enoxaparin  40 mg Subcutaneous Daily     Continuous Infusions:  PRN Meds:.acetaminophen, dextrose 10%, dextrose 10%, glucagon (human recombinant), glucose, glucose, HYDROmorphone, melatonin, naloxone, ondansetron, oxyCODONE, sodium chloride 0.9%      IBridget FNP have reviewed and discussed the case with Dr. Jamaal Verdin. Please see the following addendum for further assessment and plan from there attending MD.  ADELE Dubon   12/21/2022

## 2022-12-21 NOTE — Clinical Note
Diagnosis: Ureterolithiasis [439631]   Future Attending Provider: KISHOR POSEY [[id number]]   Admitting Provider:: MARYLOU VARGAS III [[id number]]

## 2022-12-22 VITALS
SYSTOLIC BLOOD PRESSURE: 137 MMHG | BODY MASS INDEX: 32.19 KG/M2 | TEMPERATURE: 97 F | OXYGEN SATURATION: 97 % | WEIGHT: 229.94 LBS | RESPIRATION RATE: 18 BRPM | HEIGHT: 71 IN | DIASTOLIC BLOOD PRESSURE: 81 MMHG | HEART RATE: 75 BPM

## 2022-12-22 LAB
ALBUMIN SERPL-MCNC: 3.8 G/DL (ref 3.5–5)
ALBUMIN/GLOB SERPL: 1.2 RATIO (ref 1.1–2)
ALP SERPL-CCNC: 84 UNIT/L (ref 40–150)
ALT SERPL-CCNC: 19 UNIT/L (ref 0–55)
AST SERPL-CCNC: 16 UNIT/L (ref 5–34)
BASOPHILS # BLD AUTO: 0.01 X10(3)/MCL (ref 0–0.2)
BASOPHILS NFR BLD AUTO: 0.1 %
BILIRUBIN DIRECT+TOT PNL SERPL-MCNC: 0.6 MG/DL
BUN SERPL-MCNC: 23.1 MG/DL (ref 8.4–25.7)
CALCIUM SERPL-MCNC: 9.5 MG/DL (ref 8.4–10.2)
CHLORIDE SERPL-SCNC: 106 MMOL/L (ref 98–107)
CO2 SERPL-SCNC: 23 MMOL/L (ref 22–29)
CREAT SERPL-MCNC: 1.36 MG/DL (ref 0.73–1.18)
EOSINOPHIL # BLD AUTO: 0 X10(3)/MCL (ref 0–0.9)
EOSINOPHIL NFR BLD AUTO: 0 %
ERYTHROCYTE [DISTWIDTH] IN BLOOD BY AUTOMATED COUNT: 11.9 % (ref 11.6–14.4)
GFR SERPLBLD CREATININE-BSD FMLA CKD-EPI: >60 MLS/MIN/1.73/M2
GLOBULIN SER-MCNC: 3.1 GM/DL (ref 2.4–3.5)
GLUCOSE SERPL-MCNC: 152 MG/DL (ref 74–100)
HCT VFR BLD AUTO: 44.3 % (ref 42–52)
HGB BLD-MCNC: 14.8 GM/DL (ref 14–18)
IMM GRANULOCYTES # BLD AUTO: 0.02 X10(3)/MCL (ref 0–0.04)
IMM GRANULOCYTES NFR BLD AUTO: 0.2 %
LYMPHOCYTES # BLD AUTO: 0.59 X10(3)/MCL (ref 0.6–4.6)
LYMPHOCYTES NFR BLD AUTO: 7.2 %
MCH RBC QN AUTO: 29.9 PG
MCHC RBC AUTO-ENTMCNC: 33.4 MG/DL (ref 33–36)
MCV RBC AUTO: 89.5 FL (ref 80–94)
MONOCYTES # BLD AUTO: 0.09 X10(3)/MCL (ref 0.1–1.3)
MONOCYTES NFR BLD AUTO: 1.1 %
NEUTROPHILS # BLD AUTO: 7.43 X10(3)/MCL (ref 2.1–9.2)
NEUTROPHILS NFR BLD AUTO: 91.4 %
NRBC BLD AUTO-RTO: 0 % (ref 0–1)
PLATELET # BLD AUTO: 211 X10(3)/MCL (ref 140–371)
PMV BLD AUTO: 9.9 FL (ref 9.4–12.4)
POTASSIUM SERPL-SCNC: 4.9 MMOL/L (ref 3.5–5.1)
PROT SERPL-MCNC: 6.9 GM/DL (ref 6.4–8.3)
RBC # BLD AUTO: 4.95 X10(6)/MCL (ref 4.7–6.1)
SODIUM SERPL-SCNC: 137 MMOL/L (ref 136–145)
WBC # SPEC AUTO: 8.1 X10(3)/MCL (ref 4.5–11.5)

## 2022-12-22 PROCEDURE — 36415 COLL VENOUS BLD VENIPUNCTURE: CPT | Performed by: NURSE PRACTITIONER

## 2022-12-22 PROCEDURE — 85025 COMPLETE CBC W/AUTO DIFF WBC: CPT | Performed by: NURSE PRACTITIONER

## 2022-12-22 PROCEDURE — 96376 TX/PRO/DX INJ SAME DRUG ADON: CPT

## 2022-12-22 PROCEDURE — G0378 HOSPITAL OBSERVATION PER HR: HCPCS

## 2022-12-22 PROCEDURE — 25000003 PHARM REV CODE 250: Performed by: NURSE PRACTITIONER

## 2022-12-22 PROCEDURE — 63600175 PHARM REV CODE 636 W HCPCS: Performed by: NURSE PRACTITIONER

## 2022-12-22 PROCEDURE — 80053 COMPREHEN METABOLIC PANEL: CPT | Performed by: NURSE PRACTITIONER

## 2022-12-22 RX ORDER — VALSARTAN 40 MG/1
40 TABLET ORAL DAILY
Status: DISCONTINUED | OUTPATIENT
Start: 2022-12-22 | End: 2022-12-22 | Stop reason: HOSPADM

## 2022-12-22 RX ORDER — PHENAZOPYRIDINE HYDROCHLORIDE 100 MG/1
100 TABLET, FILM COATED ORAL 3 TIMES DAILY PRN
Qty: 30 TABLET | Refills: 0 | Status: SHIPPED | OUTPATIENT
Start: 2022-12-22 | End: 2023-01-01

## 2022-12-22 RX ORDER — PHENAZOPYRIDINE HYDROCHLORIDE 100 MG/1
100 TABLET, FILM COATED ORAL
Status: DISCONTINUED | OUTPATIENT
Start: 2022-12-22 | End: 2022-12-22 | Stop reason: HOSPADM

## 2022-12-22 RX ADMIN — OXYCODONE 5 MG: 5 TABLET ORAL at 08:12

## 2022-12-22 RX ADMIN — ONDANSETRON 4 MG: 2 INJECTION INTRAMUSCULAR; INTRAVENOUS at 08:12

## 2022-12-22 NOTE — TRANSFER OF CARE
Anesthesia Transfer of Care Note    Patient: Eb Alfaro    Procedure(s) Performed: Procedure(s) (LRB):  CYSTOSCOPY, WITH URETERAL STENT INSERTION (Left)    Patient location: PACU    Anesthesia Type: general    Transport from OR: Transported from OR on room air with adequate spontaneous ventilation    Post pain: adequate analgesia    Post assessment: no apparent anesthetic complications    Post vital signs: stable    Level of consciousness: awake and alert    Nausea/Vomiting: no nausea/vomiting    Complications: none    Transfer of care protocol was followed      Last vitals:   Visit Vitals  /73 (BP Location: Right arm, Patient Position: Lying)   Pulse 78   Temp 36.5 °C (97.7 °F) (Skin)   Resp 15   Ht 6' (1.829 m)   Wt 104.3 kg (230 lb)   SpO2 99%   BMI 31.19 kg/m²

## 2022-12-22 NOTE — ANESTHESIA PROCEDURE NOTES
Intubation    Date/Time: 12/21/2022 8:05 PM  Performed by: Bob Quiñonez CRNA  Authorized by: Pipo Crane MD     Intubation:     Induction:  Intravenous    Intubated:  Postinduction    Mask Ventilation:  Not attempted    Attempts:  1    Attempted By:  CRNA    Method of Intubation:  Direct    Blade:  John 3    Laryngeal View Grade: Grade I - full view of cords      Difficult Airway Encountered?: No      Complications:  None    Airway Device:  Oral endotracheal tube    Airway Device Size:  7.0    Style/Cuff Inflation:  Cuffed    Inflation Amount (mL):  7    Tube secured:  23    Secured at:  The lips    Placement Verified By:  Capnometry    Complicating Factors:  None    Findings Post-Intubation:  BS equal bilateral and atraumatic/condition of teeth unchanged

## 2022-12-22 NOTE — DISCHARGE INSTRUCTIONS
Call urologist or come to ED for any problems, questions or worsening condition . Keep same appt previously scheduled with urologist

## 2022-12-22 NOTE — ANESTHESIA POSTPROCEDURE EVALUATION
Anesthesia Post Evaluation    Patient: Eb Alfaro    Procedure(s) Performed: Procedure(s) (LRB):  CYSTOSCOPY, WITH URETERAL STENT INSERTION (Left)    Final Anesthesia Type: general      Patient location during evaluation: PACU  Patient participation: Yes- Able to Participate  Level of consciousness: awake and alert  Post-procedure vital signs: reviewed and stable  Pain management: adequate  Airway patency: patent  MADHU mitigation strategies: Multimodal analgesia  PONV status at discharge: No PONV  Anesthetic complications: no      Cardiovascular status: blood pressure returned to baseline and hemodynamically stable  Respiratory status: unassisted  Hydration status: euvolemic  Follow-up not needed.          Vitals Value Taken Time   /70 12/21/22 2050   Temp 36.5 °C (97.7 °F) 12/21/22 2036   Pulse 88 12/21/22 2050   Resp 18 12/21/22 2050   SpO2 99 % 12/21/22 2050   Vitals shown include unvalidated device data.      No case tracking events are documented in the log.      Pain/Tommy Score: Pain Rating Prior to Med Admin: 10 (12/21/2022 12:20 PM)  Tommy Score: 9 (12/21/2022  8:34 PM)

## 2022-12-22 NOTE — NURSING
Nurses Note -- 4 Eyes      12/21/2022   10:00 AM      Skin assessed during: Admit      [x] No Pressure Injuries Present    []Prevention Measures Documented      [] Yes- Altered Skin Integrity Present or Discovered   [] LDA Added if Not in Epic (Describe Wound)   [] New Altered Skin Integrity was Present on Admit and Documented in LDA   [] Wound Image Taken    Wound Care Consulted? No    Attending Nurse:  Brooke Perry RN     Second RN/Staff Member:  Jennifer Angelo CNA

## 2022-12-22 NOTE — DISCHARGE SUMMARY
[facility] Hospital Medicine Discharge Summary    Admit Date: 12/21/2022  Discharge Date and Time: 12/22/20229:59 AM  Admitting Physician: CRUZITO Team  Discharging Physician: Jelena Vasquez DO.  Primary Care Physician: Nick Monzon MD    Discharge Diagnoses:  Left ureteral stone status post cystoscopy and left ureteral stent placement on 12/21/2022.      Hospital Course:   52 y.o. male who PMH includes GERD, HTN, HLD, hypothyroidism; presented to the ED at  [facility] on 12/21/2022 with a primary complaint of left flank pain radiating to his groin with associated nausea. Pt reports he was in the ED here 2 days ago, diagnosed with kidney stone and referred to urology services outpatient. Pt has appt with urology on 12/28/2022. Pt reports he has taken medication for nausea and pain which has provided little relief. He denies any nausea. Pt reports difficulty urinating with reports of pain and pressure. No reports of vomiting, fever, cough, congestion, or any sick contacts. Labs done significant for CO2 30 , creatinine 1.31; glucose 111, other indices unremarkable.  Abdominal x-ray KUB demonstrated no acute abnormality.  CT of the abdomen and pelvis with contrast done on 12/19/2022 demonstrated proximal left ureteral stone producing minimal upstream left collecting system dilatation; no other acute process identified.   Initial vital signs /105 pulse 79 respirations 18 temperature 98.2° F O2 saturation 100% on room air.  Patient received IV hydration in the ED, IV pain medication and IV antiemetic therapy which provided little relief in his symptoms.  Urology services were consulted per ED provider with plans to take the patient to the OR today.  Patient is admitted to hospital medicine services for further management.   Patient is status post cystoscopy for left ureteral stone and possible left ureteral stent placement, patient is to follow-up with the urologist in the outpatient  setting-maintain current appointment.    Pyridium has been prescribed for outpatient use.    Patient is eager for discharge.  All questions answered to the best of my ability.  Wife is present at bedside.      Hospital course and discharge care plan has been discussed with patient, patient voices understanding and agreement with plan. All questions have been answered to the best of my ability. Patient is advised to return to ED or call 911 in case of emergency and or if symptoms worsen.          Vitals:  VITAL SIGNS: 24 HRS MIN & MAX LAST   Temp  Min: 97.3 °F (36.3 °C)  Max: 98.2 °F (36.8 °C) 97.3 °F (36.3 °C)   BP  Min: 100/61  Max: 154/85 137/81   Pulse  Min: 61  Max: 99  75   Resp  Min: 14  Max: 20 18   SpO2  Min: 93 %  Max: 99 % 97 %       Physical Exam:    General: Appears comfortable, no acute distress.  Integumentary: Warm, dry, intact.  Musculoskeletal: Purposeful movement noted.   Respiratory: No accessory muscle use. Breath sounds are equal.  Cardiovascular: Regular rate. No peripheral edema.      Procedures Performed: No admission procedures for hospital encounter.     Significant Diagnostic Studies: See Full reports for all details    Recent Labs   Lab 12/19/22  0752 12/21/22  0857 12/22/22  0434   WBC 5.8 4.9 8.1   RBC 5.38 5.12 4.95   HGB 16.1 15.4 14.8   HCT 47.5 45.5 44.3   MCV 88.3 88.9 89.5   MCH 29.9 30.1 29.9   MCHC 33.9 33.8 33.4   RDW 12.3 12.1 11.9    221 211   MPV 9.9 9.8 9.9       Recent Labs   Lab 12/19/22  0752 12/21/22  0857 12/22/22  0434    142 137   K 4.6 4.8 4.9   CO2 30* 30* 23   BUN 15.2 20.9 23.1   CREATININE 1.31* 1.31* 1.36*   CALCIUM 10.0 9.3 9.5   ALBUMIN 4.5 4.1 3.8   ALKPHOS 94 88 84   ALT 24 20 19   AST 21 19 16   BILITOT 0.9 1.0 0.6        Microbiology Results (last 7 days)       ** No results found for the last 168 hours. **             SURG FL Surgery Fluoro Usage  See OP Notes for results.     IMPRESSION: See OP Notes for results.     This procedure was  auto-finalized by: Virtual Radiologist  X-Ray Abdomen AP 1 View (KUB)  Narrative: EXAMINATION:  XR ABDOMEN AP 1 VIEW    CLINICAL HISTORY:  Unspecified abdominal pain    TECHNIQUE:  AP view of the abdomen.    COMPARISON:  None.    FINDINGS:  There is a normal colonic stool volume.  There are no dilated loops of small bowel to suggest obstruction.  There are no abnormal calcifications evident.  The lung bases are clear.  Impression: No acute abnormality identified.    Electronically signed by: Josefina Marquez  Date:    12/21/2022  Time:    11:18         Medication List        START taking these medications      phenazopyridine 100 MG tablet  Commonly known as: PYRIDIUM  Take 1 tablet (100 mg total) by mouth 3 (three) times daily as needed for Pain.            CONTINUE taking these medications      cholecalciferol (vitamin D3) 50 mcg (2,000 unit) Cap capsule  Commonly known as: VITAMIN D3     cyclobenzaprine 10 MG tablet  Commonly known as: FLEXERIL     gabapentin 300 MG capsule  Commonly known as: NEURONTIN  Take 1 capsule (300 mg total) by mouth 3 (three) times daily.     ibuprofen 200 MG tablet  Commonly known as: ADVIL,MOTRIN     ketorolac 10 mg tablet  Commonly known as: TORADOL  Take 1 tablet (10 mg total) by mouth every 6 (six) hours as needed for Pain. Do not take any other NSAIDs     olmesartan 5 MG Tab  Commonly known as: BENICAR  Take 2 tablets (10 mg total) by mouth 2 (two) times daily.     ondansetron 4 MG Tbdl  Commonly known as: ZOFRAN-ODT  Take 1 tablet (4 mg total) by mouth every 6 (six) hours as needed (nausea/vomiting).     orphenadrine 100 mg tablet  Commonly known as: NORFLEX  Take 1 tablet (100 mg total) by mouth 2 (two) times daily.     oxyCODONE-acetaminophen  mg per tablet  Commonly known as: PERCOCET  Take 1 tablet by mouth every 6 (six) hours as needed for Pain.     rosuvastatin 10 MG tablet  Commonly known as: CRESTOR  Take 1 tablet (10 mg total) by mouth every evening.     tamsulosin  0.4 mg Cap  Commonly known as: FLOMAX  Take 1 capsule (0.4 mg total) by mouth once daily. for 14 days               Where to Get Your Medications        These medications were sent to [id number] - [place]  [address] [place] [address] AT [facility] [address]  [address] [facility] [address]      Phone: [phone]   phenazopyridine 100 MG tablet          Explained in detail to the patient about the discharge plan, medications, and follow-up visits. Pt understands and agrees with the treatment plan  Discharge Disposition: Home or Self Care   Discharged Condition: stable  Diet-   Dietary Orders (From admission, onward)       Start     Ordered    12/22/22 0906  Diet Adult Regular  Diet effective now         12/22/22 0906                   Medications Per ID med rec  Activities as tolerated   Follow-up Information       Keep follow up appointment with urologist and plan for definitive stone treatment Follow up.                           For further questions contact hospitalist office    Discharge time 33 minutes    For worsening symptoms, chest pain, shortness of breath, increased abdominal pain, high grade fever, stroke or stroke like symptoms, immediately go to the nearest Emergency Room or call 911 as soon as possible.      Jelena Blanchard M.D, on 12/22/2022. at 9:59 AM.

## 2022-12-22 NOTE — OP NOTE
UROLOGY OPERATIVE NOTE    Eb Alfaro  1970  [id number]  12/21/2022      Pre-op Diagnosis: L ureteral stone    Post-op Diagnosis: Same    Procedure: Cystoscopy, L Retrograde Pyelogram, L Ureteral Stent    Surgeon: Ramirez Wei M.D.    Assistant: N/A    Anesthesia: LMA    Complications: none    IVF: 1000cc crystalloid    Blood Loss: 0cc    Indications: ureteral obstruction    Findings: L 6x26 ureteral stent    Disposition: Taken to the PACU in stable condition    Condition: Doing well without problems    Procedure in Detail:  After appropriate consent was obtained the patient was taken to the OR and placed in the supine position.  Anesthesia was induced.  They were repositioned into dorsal lithotomy.  All lines were placed and pressure points padded.  They were prepped and draped in sterile fashion.  Time out was performed.  The received appropriate preoperative antibiotic therapy.      We entered the bladder with a 21F rigid cystoscope.  Systematic inspection was performed revealing no diverticulum, tumor, or stone.  We visualized the L ureteral orifice effluxing clear urine.  It was cannulated with a 5F open ended catheter.  Glidewire was placed through the 5F and the 5F back loaded.  Next a L 6x26 ureteral stent was placed over the wire under direct vision and fluoroscopic guidance with good curl in the kidney and bladder.  The bladder was emptied and the procedure terminated.      He will need ESWL vs USE pending KUB once he convalesces outpatient.    He can be discharged home when clinically stable.

## 2022-12-22 NOTE — ANESTHESIA PREPROCEDURE EVALUATION
12/21/2022  Eb Alfaro is a 52 y.o., male.      Pre-op Assessment    I have reviewed the Patient Summary Reports.     I have reviewed the Nursing Notes. I have reviewed the NPO Status.   I have reviewed the Medications.     Review of Systems  Anesthesia Hx:  No problems with previous Anesthesia    Hematology/Oncology:  Hematology Normal   Oncology Normal     EENT/Dental:EENT/Dental Normal   Cardiovascular:   Hypertension    Pulmonary:  Pulmonary Normal    Renal/:  Renal/ Normal     Hepatic/GI:  Hepatic/GI Normal    Musculoskeletal:  Musculoskeletal Normal    Neurological:   Neuromuscular Disease, ACF in march - had cervical stenosis with some myelopathy   Endocrine:  Endocrine Normal    Dermatological:  Skin Normal    Psych:  Psychiatric Normal           Physical Exam  General: Well nourished, Cooperative, Alert and Oriented    Airway:  Mallampati: III   Mouth Opening: Normal  TM Distance: Normal  Tongue: Normal  Neck ROM: Extension Decreased, Flexion Decreased    Dental:  Intact    Chest/Lungs:  Clear to auscultation    Heart:  Rate: Normal        Anesthesia Plan  Type of Anesthesia, risks & benefits discussed:    Anesthesia Type: Gen ETT  Intra-op Monitoring Plan: Standard ASA Monitors  Post Op Pain Control Plan: multimodal analgesia  Induction:  IV and rapid sequence  Airway Plan: Direct  ASA Score: 2  Day of Surgery Review of History & Physical: H&P Update referred to the surgeon/provider.I have interviewed and examined the patient. I have reviewed the patient's H&P dated:     Ready For Surgery From Anesthesia Perspective.     .

## 2022-12-22 NOTE — PROGRESS NOTES
.UROLOGY  PROGRESS  NOTE    Eb Alfaro 1970  [id number]  12/22/2022    S/p cysto with left ureteral stent placement POD 1    Feeling better than yesterday  Complaining of burning with urination; normal with stent  Vital signs stable, afebrile   Tolerating a regular diet without nausea      Intake/Output:  No intake/output data recorded.  I/O last 3 completed shifts:  In: 501 [P.O.:500; I.V.:1]  Out: 350 [Urine:350]       Exam:    NAD  Card RRR  Resp unlabored      Recent Results (from the past 24 hour(s))   Comp. Metabolic Panel    Collection Time: 12/21/22  8:57 AM   Result Value Ref Range    Sodium Level 142 136 - 145 mmol/L    Potassium Level 4.8 3.5 - 5.1 mmol/L    Chloride 105 98 - 107 mmol/L    Carbon Dioxide 30 (H) 22 - 29 mmol/L    Glucose Level 111 (H) 74 - 100 mg/dL    Blood Urea Nitrogen 20.9 8.4 - 25.7 mg/dL    Creatinine 1.31 (H) 0.73 - 1.18 mg/dL    Calcium Level Total 9.3 8.4 - 10.2 mg/dL    Protein Total 7.0 6.4 - 8.3 gm/dL    Albumin Level 4.1 3.5 - 5.0 g/dL    Globulin 2.9 2.4 - 3.5 gm/dL    Albumin/Globulin Ratio 1.4 1.1 - 2.0 ratio    Bilirubin Total 1.0 <=1.5 mg/dL    Alkaline Phosphatase 88 40 - 150 unit/L    Alanine Aminotransferase 20 0 - 55 unit/L    Aspartate Aminotransferase 19 5 - 34 unit/L    eGFR >60 mls/min/1.73/m2   CBC with Differential    Collection Time: 12/21/22  8:57 AM   Result Value Ref Range    WBC 4.9 4.5 - 11.5 x10(3)/mcL    RBC 5.12 4.70 - 6.10 x10(6)/mcL    Hgb 15.4 14.0 - 18.0 gm/dL    Hct 45.5 42.0 - 52.0 %    MCV 88.9 80.0 - 94.0 fL    MCH 30.1 pg    MCHC 33.8 33.0 - 36.0 mg/dL    RDW 12.1 11.6 - 14.4 %    Platelet 221 140 - 371 x10(3)/mcL    MPV 9.8 9.4 - 12.4 fL    Neut % 62.5 %    Lymph % 23.7 %    Mono % 9.5 %    Eos % 3.1 %    Basophil % 1.0 %    Lymph # 1.15 0.6 - 4.6 x10(3)/mcL    Neut # 3.04 2.1 - 9.2 x10(3)/mcL    Mono # 0.46 0.1 - 1.3 x10(3)/mcL    Eos # 0.15 0 - 0.9 x10(3)/mcL    Baso # 0.05 0 - 0.2 x10(3)/mcL    IG# 0.01 0 - 0.04 x10(3)/mcL    IG%  0.2 %    NRBC% 0.0 0 - 1 %   Urinalysis, Reflex to Urine Culture Urine, Clean Catch    Collection Time: 12/21/22  9:15 AM    Specimen: Urine   Result Value Ref Range    Color, UA Yellow Yellow, Light-Yellow, Dark Yellow, Dav[place], Straw    Appearance, UA Clear Clear    Specific Gravity, UA 1.023 1.001 - 1.030    pH, UA 7.5 5.0 - 8.5    Protein, UA 1+ (A) Negative mg/dL    Glucose, UA Negative Negative, Normal mg/dL    Ketones, UA Negative Negative mg/dL    Blood, UA 3+ (A) Negative unit/L    Bilirubin, UA Negative Negative mg/dL    Urobilinogen, UA 1.0 0.2, 1.0, Normal mg/dL    Nitrites, UA Negative Negative    Leukocyte Esterase, UA Negative Negative unit/L   Urinalysis, Microscopic    Collection Time: 12/21/22  9:15 AM   Result Value Ref Range    RBC,  (H) <=5 /HPF    WBC, UA <5 <=5 /HPF    Squamous Epithelial Cells, UA <5 <=5 /HPF    Bacteria, UA None Seen None Seen, Rare, Occasional /HPF   Comprehensive Metabolic Panel (CMP)    Collection Time: 12/22/22  4:34 AM   Result Value Ref Range    Sodium Level 137 136 - 145 mmol/L    Potassium Level 4.9 3.5 - 5.1 mmol/L    Chloride 106 98 - 107 mmol/L    Carbon Dioxide 23 22 - 29 mmol/L    Glucose Level 152 (H) 74 - 100 mg/dL    Blood Urea Nitrogen 23.1 8.4 - 25.7 mg/dL    Creatinine 1.36 (H) 0.73 - 1.18 mg/dL    Calcium Level Total 9.5 8.4 - 10.2 mg/dL    Protein Total 6.9 6.4 - 8.3 gm/dL    Albumin Level 3.8 3.5 - 5.0 g/dL    Globulin 3.1 2.4 - 3.5 gm/dL    Albumin/Globulin Ratio 1.2 1.1 - 2.0 ratio    Bilirubin Total 0.6 <=1.5 mg/dL    Alkaline Phosphatase 84 40 - 150 unit/L    Alanine Aminotransferase 19 0 - 55 unit/L    Aspartate Aminotransferase 16 5 - 34 unit/L    eGFR >60 mls/min/1.73/m2   CBC with Differential    Collection Time: 12/22/22  4:34 AM   Result Value Ref Range    WBC 8.1 4.5 - 11.5 x10(3)/mcL    RBC 4.95 4.70 - 6.10 x10(6)/mcL    Hgb 14.8 14.0 - 18.0 gm/dL    Hct 44.3 42.0 - 52.0 %    MCV 89.5 80.0 - 94.0 fL    MCH 29.9 pg    MCHC 33.4 33.0 -  36.0 mg/dL    RDW 11.9 11.6 - 14.4 %    Platelet 211 140 - 371 x10(3)/mcL    MPV 9.9 9.4 - 12.4 fL    Neut % 91.4 %    Lymph % 7.2 %    Mono % 1.1 %    Eos % 0.0 %    Basophil % 0.1 %    Lymph # 0.59 (L) 0.6 - 4.6 x10(3)/mcL    Neut # 7.43 2.1 - 9.2 x10(3)/mcL    Mono # 0.09 (L) 0.1 - 1.3 x10(3)/mcL    Eos # 0.00 0 - 0.9 x10(3)/mcL    Baso # 0.01 0 - 0.2 x10(3)/mcL    IG# 0.02 0 - 0.04 x10(3)/mcL    IG% 0.2 %    NRBC% 0.0 0 - 1 %         Assessment:  Left ureteral stone status post cystoscopy with left ureteral stent placement      Plan:  Can be discharged from a  standpoint, recommend Pyridium for dysuria. Keep follow up appointment to plan for definitive stone treatment    ADELE Dubois

## 2022-12-30 ENCOUNTER — PATIENT MESSAGE (OUTPATIENT)
Dept: UROLOGY | Facility: CLINIC | Age: 52
End: 2022-12-30
Payer: COMMERCIAL

## 2022-12-30 ENCOUNTER — PATIENT MESSAGE (OUTPATIENT)
Dept: PRIMARY CARE CLINIC | Facility: CLINIC | Age: 52
End: 2022-12-30
Payer: COMMERCIAL

## 2023-01-10 ENCOUNTER — PATIENT MESSAGE (OUTPATIENT)
Dept: PRIMARY CARE CLINIC | Facility: CLINIC | Age: 53
End: 2023-01-10
Payer: COMMERCIAL

## 2023-01-11 ENCOUNTER — PATIENT MESSAGE (OUTPATIENT)
Dept: PRIMARY CARE CLINIC | Facility: CLINIC | Age: 53
End: 2023-01-11
Payer: COMMERCIAL

## 2023-01-11 ENCOUNTER — LAB VISIT (OUTPATIENT)
Dept: LAB | Facility: HOSPITAL | Age: 53
End: 2023-01-11
Attending: STUDENT IN AN ORGANIZED HEALTH CARE EDUCATION/TRAINING PROGRAM
Payer: COMMERCIAL

## 2023-01-11 ENCOUNTER — TELEPHONE (OUTPATIENT)
Dept: PRIMARY CARE CLINIC | Facility: CLINIC | Age: 53
End: 2023-01-11
Payer: COMMERCIAL

## 2023-01-11 DIAGNOSIS — E83.52 HYPERCALCEMIA: ICD-10-CM

## 2023-01-11 DIAGNOSIS — N20.1 URETEROLITHIASIS: Primary | ICD-10-CM

## 2023-01-11 DIAGNOSIS — E21.0 HYPERPARATHYROIDISM, PRIMARY: ICD-10-CM

## 2023-01-11 DIAGNOSIS — N17.9 AKI (ACUTE KIDNEY INJURY): ICD-10-CM

## 2023-01-11 LAB
ALBUMIN SERPL-MCNC: 4 G/DL (ref 3.5–5)
ALBUMIN/GLOB SERPL: 1.5 RATIO (ref 1.1–2)
ALP SERPL-CCNC: 81 UNIT/L (ref 40–150)
ALT SERPL-CCNC: 20 UNIT/L (ref 0–55)
AST SERPL-CCNC: 19 UNIT/L (ref 5–34)
BILIRUBIN DIRECT+TOT PNL SERPL-MCNC: 0.8 MG/DL
BUN SERPL-MCNC: 24.7 MG/DL (ref 8.4–25.7)
CALCIUM SERPL-MCNC: 10 MG/DL (ref 8.4–10.2)
CHLORIDE SERPL-SCNC: 105 MMOL/L (ref 98–107)
CO2 SERPL-SCNC: 26 MMOL/L (ref 22–29)
CREAT SERPL-MCNC: 1.28 MG/DL (ref 0.73–1.18)
GFR SERPLBLD CREATININE-BSD FMLA CKD-EPI: >60 MLS/MIN/1.73/M2
GLOBULIN SER-MCNC: 2.6 GM/DL (ref 2.4–3.5)
GLUCOSE SERPL-MCNC: 100 MG/DL (ref 74–100)
POTASSIUM SERPL-SCNC: 4.5 MMOL/L (ref 3.5–5.1)
PROT SERPL-MCNC: 6.6 GM/DL (ref 6.4–8.3)
PTH-INTACT SERPL-MCNC: 33.4 PG/ML (ref 8.7–77)
SODIUM SERPL-SCNC: 138 MMOL/L (ref 136–145)

## 2023-01-11 PROCEDURE — 36415 COLL VENOUS BLD VENIPUNCTURE: CPT

## 2023-01-11 PROCEDURE — 83970 ASSAY OF PARATHORMONE: CPT

## 2023-01-11 PROCEDURE — 80053 COMPREHEN METABOLIC PANEL: CPT

## 2023-01-11 RX ORDER — KETOROLAC TROMETHAMINE 10 MG/1
TABLET, FILM COATED ORAL
COMMUNITY
Start: 2023-01-04 | End: 2023-01-12

## 2023-01-11 NOTE — TELEPHONE ENCOUNTER
Patient came by office reports he has been trying to reach urology to get refill on the following  Ketorolac 10mg  bottle noted to take every 6- 8 hours as needed.  Please advise if we can fill as patient surgery has been pushed back from 1/16 to 2/3.

## 2023-01-11 NOTE — TELEPHONE ENCOUNTER
----- Message from Philip Mosquera sent at 1/11/2023  2:47 PM CST -----  Regarding: call back  .Type:  Needs Medical Advice    Who Called: jennifer  Would the patient rather a call back or a response via [facility]?   Best Call Back Number: [phone]  Additional Information: pt would like a call back from tru to discuss a medication that he brought in this morning.

## 2023-01-12 RX ORDER — TRAMADOL HYDROCHLORIDE 50 MG/1
50 TABLET ORAL EVERY 8 HOURS PRN
Qty: 30 TABLET | Refills: 0 | Status: ON HOLD | OUTPATIENT
Start: 2023-01-12 | End: 2023-03-23 | Stop reason: HOSPADM

## 2023-01-26 ENCOUNTER — CLINICAL SUPPORT (OUTPATIENT)
Dept: PREADMISSION TESTING | Facility: HOSPITAL | Age: 53
End: 2023-01-26
Attending: UROLOGY
Payer: COMMERCIAL

## 2023-01-26 DIAGNOSIS — Z01.818 PREOPERATIVE EXAMINATION, UNSPECIFIED: ICD-10-CM

## 2023-01-26 PROCEDURE — 93010 EKG 12-LEAD: ICD-10-PCS | Mod: ,,, | Performed by: INTERNAL MEDICINE

## 2023-01-26 PROCEDURE — 93010 ELECTROCARDIOGRAM REPORT: CPT | Mod: ,,, | Performed by: INTERNAL MEDICINE

## 2023-01-26 PROCEDURE — 93005 ELECTROCARDIOGRAM TRACING: CPT

## 2023-01-30 ENCOUNTER — PATIENT MESSAGE (OUTPATIENT)
Dept: ADMINISTRATIVE | Facility: HOSPITAL | Age: 53
End: 2023-01-30
Payer: COMMERCIAL

## 2023-02-02 NOTE — DISCHARGE INSTRUCTIONS
Patient Education       Lithotripsy for Kidney Stones Discharge Instructions   About this topic   Laser lithotripsy is a procedure to break up kidney stones without cutting the skin. The stone is broken down into tiny sand-like pieces and flushed out of the urinary tract.  The urinary tract is made up of the kidney, ureters, bladder, and urethra. The kidneys make urine and it drains down into tubes called ureters. These ureters are connected to the bladder. The bladder then squeezes out the urine and it exits the body through the urethra.  Sometimes, salts and minerals in your urine build up and form stones. The stones are hard and can get stuck on their way out of the body. Some stones are too large and block the flow of urine. Others cause bleeding and pain. They may damage the kidney. These stones need a procedure like laser lithotripsy to break them up.         What care is needed at home?   This is what you will need to know:  Strain your urine by using a filter. This will hold the stone pieces. Save the pieces for your doctor in a zip lock bag. Bring the collected stones on your next visit. Pieces of the kidney stone may pass in the urine for a few days and cause mild pain. Your doctor may give you medication for the pain. Take the medications as ordered by your doctor. You may take over the counter Tylenol for pain.  Drink 8 to 10 glasses of water each day. This will help flush the broken kidney stones out. This will keep your hydrated.   What follow-up care is needed?   Be sure to keep your follow up appointment.  If you have a stent, your doctor may want to take it out or may teach you how to take it out.  Will physical activity be limited?   You may have to limit your activity for a while. Talk to your doctor about the right amount of activity for you.  What changes to diet are needed?   Talk to your doctor or dietitian about your personal diet plan to prevent more stones. Ask if there are foods you should  avoid.  What problems could happen?   Pain while pieces of the kidney stone pass  Pain or irritation from the stent, especially when urinating  Blocked urine flow if stone fragments are too big to pass  Kidney or ureter injury  High blood pressure  Fevers or urinary tract infection  Blood in the urine  Not able to pass urine  Bruising  Discomfort in the back or belly  What can be done to prevent this health problem?   Prevent or treat urinary tract infections.  Drink lots of water during the day and evening. When you have less fluid in your body, urine becomes concentrated. This increases your chance of kidney stones.  Follow the diet plan your dietitian gives you to prevent kidney stones.  Limit foods or drugs that may cause kidney stones.  When do I need to call the doctor?   Signs of infection. These include a fever of 100.4°F (38°C) or higher, chills, pain or burning with passing urine.  Very bad pain in your back or side that will not go away  Throwing up  Urine smells bad, looks cloudy, or has blood in it  No urine for more than 6 hours  Very bad pain in your chest, shoulder, or belly  More swelling of your ankles, legs, and hands or tightness with your shoes or rings     Patient Education  Cystoscopy Discharge Instructions   About this topic   Your kidneys make urine. It is stored in your bladder. The urethra is a tube at the bottom of the bladder. Urine flows out of this tube. Sometimes, there is a blockage and urine is not able to leave the body.  A cystoscopy is a procedure that lets the doctor see the inside of your bladder and urethra. The doctor does it to:  Look for stones or tumors blocking the bladder and urethra  Look for changes or injury inside the bladder  Take a tissue sample from the inside of your bladder  Look for reasons for blood in the urine, pain with urination, or why you are passing urine often  Look for prostate problems     What care is needed at home?   Take a warm bath or use a warm  wet washcloth over the opening to the urethra. This will help to ease any pain. Do this as needed.  Drink 6 to 8 glasses of water a day and 3 to 4 glasses in the first few hours after the procedure to flush out your bladder and reduce irritation.  You may see some blood in your urine for a few days. This is normal.  Empty your bladder as soon as you feel the need to. Don't delay going to the bathroom. It stretches and weakens the bladder.  What follow-up care is needed?   Be sure to keep your follow up visit.  If you had a biopsy, talk with your doctor about the results.  Will physical activity be limited?   Talk to your doctor about when you may go back to your normal activities like work, driving, or sex.  What problems could happen?   Bleeding  Infection  Injury to the bladder and urethra  Discomfort in the urethra area  Burning sensation for a short time  Upset stomach  When do I need to call the doctor?   Signs of infection. These include a fever of 100.4°F (38°C) or higher, chills, pain with passing urine.  Pain that does not go away even with drugs or that lasts longer than 2 days  Too much blood in your urine  Passing large dime-sized clots  Cloudy urine  Little or no urine or not able to pass urine  Abdominal pain and nausea

## 2023-02-03 ENCOUNTER — ANESTHESIA (OUTPATIENT)
Dept: SURGERY | Facility: HOSPITAL | Age: 53
End: 2023-02-03
Payer: COMMERCIAL

## 2023-02-03 ENCOUNTER — ANESTHESIA EVENT (OUTPATIENT)
Dept: SURGERY | Facility: HOSPITAL | Age: 53
End: 2023-02-03
Payer: COMMERCIAL

## 2023-02-03 ENCOUNTER — HOSPITAL ENCOUNTER (OUTPATIENT)
Facility: HOSPITAL | Age: 53
Discharge: HOME | End: 2023-02-03
Attending: UROLOGY | Admitting: UROLOGY
Payer: COMMERCIAL

## 2023-02-03 DIAGNOSIS — N20.1 CALCULUS OF URETER: ICD-10-CM

## 2023-02-03 DIAGNOSIS — N20.1 URETEROLITHIASIS: ICD-10-CM

## 2023-02-03 DIAGNOSIS — Z01.818 PREOPERATIVE EXAMINATION, UNSPECIFIED: Primary | ICD-10-CM

## 2023-02-03 PROCEDURE — 63600175 PHARM REV CODE 636 W HCPCS: Performed by: UROLOGY

## 2023-02-03 PROCEDURE — 82365 CALCULUS SPECTROSCOPY: CPT

## 2023-02-03 PROCEDURE — C2617 STENT, NON-COR, TEM W/O DEL: HCPCS | Performed by: UROLOGY

## 2023-02-03 PROCEDURE — C1894 INTRO/SHEATH, NON-LASER: HCPCS | Performed by: UROLOGY

## 2023-02-03 PROCEDURE — 37000009 HC ANESTHESIA EA ADD 15 MINS: Performed by: UROLOGY

## 2023-02-03 PROCEDURE — 36000707: Performed by: UROLOGY

## 2023-02-03 PROCEDURE — 71000015 HC POSTOP RECOV 1ST HR: Performed by: UROLOGY

## 2023-02-03 PROCEDURE — 63600175 PHARM REV CODE 636 W HCPCS: Performed by: NURSE ANESTHETIST, CERTIFIED REGISTERED

## 2023-02-03 PROCEDURE — 63600175 PHARM REV CODE 636 W HCPCS: Performed by: ANESTHESIOLOGY

## 2023-02-03 PROCEDURE — C1769 GUIDE WIRE: HCPCS | Performed by: UROLOGY

## 2023-02-03 PROCEDURE — 71000039 HC RECOVERY, EACH ADD'L HOUR: Performed by: UROLOGY

## 2023-02-03 PROCEDURE — 25000003 PHARM REV CODE 250: Performed by: UROLOGY

## 2023-02-03 PROCEDURE — 71000016 HC POSTOP RECOV ADDL HR: Performed by: UROLOGY

## 2023-02-03 PROCEDURE — 63600175 PHARM REV CODE 636 W HCPCS

## 2023-02-03 PROCEDURE — 36000706: Performed by: UROLOGY

## 2023-02-03 PROCEDURE — 25000003 PHARM REV CODE 250: Performed by: NURSE ANESTHETIST, CERTIFIED REGISTERED

## 2023-02-03 PROCEDURE — 27201423 OPTIME MED/SURG SUP & DEVICES STERILE SUPPLY: Performed by: UROLOGY

## 2023-02-03 PROCEDURE — 37000008 HC ANESTHESIA 1ST 15 MINUTES: Performed by: UROLOGY

## 2023-02-03 PROCEDURE — C1758 CATHETER, URETERAL: HCPCS | Performed by: UROLOGY

## 2023-02-03 PROCEDURE — 71000033 HC RECOVERY, INTIAL HOUR: Performed by: UROLOGY

## 2023-02-03 DEVICE — STENT URT DBL PGTL FLX AQUA 6F: Type: IMPLANTABLE DEVICE | Site: URETER | Status: FUNCTIONAL

## 2023-02-03 RX ORDER — MEPERIDINE HYDROCHLORIDE 25 MG/ML
INJECTION INTRAMUSCULAR; INTRAVENOUS; SUBCUTANEOUS
Status: COMPLETED
Start: 2023-02-03 | End: 2023-02-03

## 2023-02-03 RX ORDER — SODIUM CHLORIDE, SODIUM LACTATE, POTASSIUM CHLORIDE, CALCIUM CHLORIDE 600; 310; 30; 20 MG/100ML; MG/100ML; MG/100ML; MG/100ML
INJECTION, SOLUTION INTRAVENOUS CONTINUOUS
Status: DISCONTINUED | OUTPATIENT
Start: 2023-02-03 | End: 2023-02-03

## 2023-02-03 RX ORDER — PHENAZOPYRIDINE HYDROCHLORIDE 200 MG/1
200 TABLET, FILM COATED ORAL 3 TIMES DAILY PRN
Qty: 30 TABLET | Refills: 0 | Status: SHIPPED | OUTPATIENT
Start: 2023-02-03 | End: 2023-02-13

## 2023-02-03 RX ORDER — LIDOCAINE HYDROCHLORIDE 20 MG/ML
JELLY TOPICAL
Status: DISCONTINUED
Start: 2023-02-03 | End: 2023-02-03 | Stop reason: HOSPADM

## 2023-02-03 RX ORDER — CIPROFLOXACIN 2 MG/ML
400 INJECTION, SOLUTION INTRAVENOUS
Status: COMPLETED | OUTPATIENT
Start: 2023-02-03 | End: 2023-02-03

## 2023-02-03 RX ORDER — LIDOCAINE HYDROCHLORIDE 10 MG/ML
1 INJECTION, SOLUTION EPIDURAL; INFILTRATION; INTRACAUDAL; PERINEURAL ONCE
Status: DISCONTINUED | OUTPATIENT
Start: 2023-02-03 | End: 2023-02-03

## 2023-02-03 RX ORDER — LIDOCAINE HYDROCHLORIDE 10 MG/ML
INJECTION, SOLUTION EPIDURAL; INFILTRATION; INTRACAUDAL; PERINEURAL
Status: DISCONTINUED | OUTPATIENT
Start: 2023-02-03 | End: 2023-02-03

## 2023-02-03 RX ORDER — MEPERIDINE HYDROCHLORIDE 25 MG/ML
25 INJECTION INTRAMUSCULAR; INTRAVENOUS; SUBCUTANEOUS ONCE
Status: DISCONTINUED | OUTPATIENT
Start: 2023-02-03 | End: 2023-02-03

## 2023-02-03 RX ORDER — HYDROMORPHONE HYDROCHLORIDE 2 MG/ML
0.2 INJECTION, SOLUTION INTRAMUSCULAR; INTRAVENOUS; SUBCUTANEOUS EVERY 5 MIN PRN
Status: DISCONTINUED | OUTPATIENT
Start: 2023-02-03 | End: 2023-02-03

## 2023-02-03 RX ORDER — HYDROMORPHONE HYDROCHLORIDE 2 MG/ML
INJECTION, SOLUTION INTRAMUSCULAR; INTRAVENOUS; SUBCUTANEOUS
Status: DISCONTINUED
Start: 2023-02-03 | End: 2023-02-03 | Stop reason: HOSPADM

## 2023-02-03 RX ORDER — KETOROLAC TROMETHAMINE 10 MG/1
10 TABLET, FILM COATED ORAL EVERY 6 HOURS
Qty: 20 TABLET | Refills: 0 | Status: SHIPPED | OUTPATIENT
Start: 2023-02-03 | End: 2023-02-08

## 2023-02-03 RX ORDER — LIDOCAINE HYDROCHLORIDE 20 MG/ML
JELLY TOPICAL
Status: DISCONTINUED | OUTPATIENT
Start: 2023-02-03 | End: 2023-02-03 | Stop reason: HOSPADM

## 2023-02-03 RX ORDER — HYOSCYAMINE SULFATE 0.12 MG/1
0.12 TABLET SUBLINGUAL EVERY 4 HOURS PRN
Qty: 30 TABLET | Refills: 0 | Status: ON HOLD | OUTPATIENT
Start: 2023-02-03 | End: 2023-03-23 | Stop reason: HOSPADM

## 2023-02-03 RX ORDER — ONDANSETRON 2 MG/ML
4 INJECTION INTRAMUSCULAR; INTRAVENOUS ONCE AS NEEDED
Status: COMPLETED | OUTPATIENT
Start: 2023-02-03 | End: 2023-02-03

## 2023-02-03 RX ORDER — FENTANYL CITRATE 50 UG/ML
INJECTION, SOLUTION INTRAMUSCULAR; INTRAVENOUS
Status: DISCONTINUED | OUTPATIENT
Start: 2023-02-03 | End: 2023-02-03

## 2023-02-03 RX ORDER — MIDAZOLAM HYDROCHLORIDE 1 MG/ML
2 INJECTION INTRAMUSCULAR; INTRAVENOUS ONCE AS NEEDED
Status: COMPLETED | OUTPATIENT
Start: 2023-02-03 | End: 2023-02-03

## 2023-02-03 RX ORDER — PROPOFOL 10 MG/ML
VIAL (ML) INTRAVENOUS
Status: DISCONTINUED | OUTPATIENT
Start: 2023-02-03 | End: 2023-02-03

## 2023-02-03 RX ADMIN — PROPOFOL 200 MG: 10 INJECTION, EMULSION INTRAVENOUS at 11:02

## 2023-02-03 RX ADMIN — ONDANSETRON 4 MG: 2 INJECTION INTRAMUSCULAR; INTRAVENOUS at 02:02

## 2023-02-03 RX ADMIN — HYDROMORPHONE HYDROCHLORIDE 0.2 MG: 2 INJECTION INTRAMUSCULAR; INTRAVENOUS; SUBCUTANEOUS at 12:02

## 2023-02-03 RX ADMIN — MIDAZOLAM HYDROCHLORIDE 2 MG: 1 INJECTION, SOLUTION INTRAMUSCULAR; INTRAVENOUS at 11:02

## 2023-02-03 RX ADMIN — SODIUM CHLORIDE, POTASSIUM CHLORIDE, SODIUM LACTATE AND CALCIUM CHLORIDE: 600; 310; 30; 20 INJECTION, SOLUTION INTRAVENOUS at 10:02

## 2023-02-03 RX ADMIN — HYDROMORPHONE HYDROCHLORIDE 0.2 MG: 2 INJECTION INTRAMUSCULAR; INTRAVENOUS; SUBCUTANEOUS at 01:02

## 2023-02-03 RX ADMIN — LIDOCAINE HYDROCHLORIDE 50 MG: 10 INJECTION, SOLUTION EPIDURAL; INFILTRATION; INTRACAUDAL; PERINEURAL at 11:02

## 2023-02-03 RX ADMIN — MEPERIDINE HYDROCHLORIDE 25 MG: 25 INJECTION INTRAMUSCULAR; INTRAVENOUS; SUBCUTANEOUS at 01:02

## 2023-02-03 RX ADMIN — CIPROFLOXACIN 400 MG: 2 INJECTION, SOLUTION INTRAVENOUS at 11:02

## 2023-02-03 RX ADMIN — FENTANYL CITRATE 50 MCG: 50 INJECTION, SOLUTION INTRAMUSCULAR; INTRAVENOUS at 11:02

## 2023-02-03 RX ADMIN — SODIUM CHLORIDE, POTASSIUM CHLORIDE, SODIUM LACTATE AND CALCIUM CHLORIDE: 600; 310; 30; 20 INJECTION, SOLUTION INTRAVENOUS at 11:02

## 2023-02-03 NOTE — OP NOTE
UROLOGY OPERATIVE NOTE    Eb Alfaro  1970  [id number]  2/3/2023      Pre-op Diagnosis: L distal impacted ureteral stone    Post-op Diagnosis: Same    Procedure: Cystoscopy, L Ureteroscopic Stone Extraction, L Ureteral Stent    Surgeon: Ramirez Wei M.D.    Assistant: N/A    Anesthesia: LMA    Complications: none    IVF: 1000cc crystalloid    Blood Loss: 0cc    Indications: urolithiasis    Findings: L proximal ureteral stone    Disposition: Taken to the PACU in stable condition    Condition: Doing well without problems    Procedure in Detail:  After appropriate consent was obtained the patient was taken to the OR and placed in the supine position.  Anesthesia was induced.  They were repositioned into dorsal lithotomy.  All lines were placed and pressure points padded.  They were prepped and draped in sterile fashion.  Time out was performed.  The received appropriate preoperative antibiotic therapy.      We entered the bladder with a 21F rigid cystoscope.  Systematic inspection was performed revealing no diverticulum, tumor, or stone.  We visualized the L ureteral orifice effluxing clear urine.  It was cannulated with a glidewire under fluoro.  I then used a dual lumen to dilate the UO and place a second working wire.  I then advanced a rigid ureteroscope under fluoro and vision to the L distal ureter where the stone was encountered.  I used a tipless nitinol basket to grasp the fragments and pull them out and send them for analysis.  The scope was advanced to the renal pelvis.  All calyces were inspected showing no stones.  On slow regress I inspected the ureter which was healthy.  I elected to place a 6x26 ureteral stent externalized.  I emptied the bladder and terminated the procedure.    They will need a renal US in 3 months.  They can remove the stent Monday am.

## 2023-02-03 NOTE — PLAN OF CARE
Problem: Hypertension Acute  Goal: Blood Pressure Within Desired Range  Outcome: Met     Problem: Adult Inpatient Plan of Care  Goal: Plan of Care Review  Outcome: Met  Goal: Patient-Specific Goal (Individualized)  Outcome: Met  Goal: Absence of Hospital-Acquired Illness or Injury  Outcome: Met  Goal: Optimal Comfort and Wellbeing  Outcome: Met  Goal: Readiness for Transition of Care  Outcome: Met

## 2023-02-03 NOTE — ANESTHESIA POSTPROCEDURE EVALUATION
Anesthesia Post Evaluation    Patient: Eb Alfaro    Procedure(s) Performed: Procedure(s) (LRB):  CYSTOURETEROSCOPY, WITH HOLMIUM LASER LITHOTRIPSY OF URETERAL CALCULUS AND STENT INSERTION / Stent exchange Left (Left)    Final Anesthesia Type: general      Patient location during evaluation: PACU  Patient participation: No - Unable to Participate, Sedation  Level of consciousness: sedated  Post-procedure vital signs: reviewed and stable  Pain management: adequate  Airway patency: patent  MADHU mitigation strategies: Multimodal analgesia  PONV status at discharge: No PONV  Anesthetic complications: no      Cardiovascular status: stable  Respiratory status: nasal cannula  Hydration status: euvolemic  Follow-up not needed.          Vitals Value Taken Time   /92 02/03/23 1025   Temp 36.7 °C (98.1 °F) 02/03/23 1025   Pulse 73 02/03/23 1025   Resp 20 02/03/23 1034   SpO2 99 % 02/03/23 1025         No case tracking events are documented in the log.      Pain/Tommy Score: No data recorded

## 2023-02-03 NOTE — PLAN OF CARE
Phone call to  patient requesting nausea prescription for home and wants something else for pain besides toradol, will call in nausea medication and will figure out what other mediation to prescribe for pain, reported to patient and family, agree with plan of care

## 2023-02-03 NOTE — PLAN OF CARE
Pt is [id number] score9/10. Resume o2 per nasal cannula in phase2 s/p pain mgmt til ready to be weaned-he reports improved comfort and is able to rest-he meets criteria for phase2 care per dr dickson-to rm2 via bed

## 2023-02-03 NOTE — DISCHARGE SUMMARY
[facility] Surgical - Periop Services  Discharge Note  Short Stay    Procedure(s) (LRB):  CYSTOURETEROSCOPY, WITH HOLMIUM LASER LITHOTRIPSY OF URETERAL CALCULUS AND STENT INSERTION / Stent exchange Left (Left)      OUTCOME: Patient tolerated treatment/procedure well without complication and is now ready for discharge.    DISPOSITION: Home or Self Care    FINAL DIAGNOSIS:  <principal problem not specified>    FOLLOWUP: In clinic    DISCHARGE INSTRUCTIONS:    Discharge Procedure Orders   Diet Adult Regular     No dressing needed   Order Comments: If stent in place with string then remove Monday by pulling string at home or call office     EKG 12-lead   Standing Status: Future Number of Occurrences: 1 Standing Exp. Date: 01/26/24     Activity as tolerated        TIME SPENT ON DISCHARGE: <30 minutes

## 2023-02-03 NOTE — ANESTHESIA PREPROCEDURE EVALUATION
02/03/2023  Eb Alfaro is a 52 y.o., male , who presents for the following:    Procedure: CYSTOURETEROSCOPY, WITH HOLMIUM LASER LITHOTRIPSY OF URETERAL CALCULUS AND STENT INSERTION / Stent exchange Left (Left)   Anesthesia type: General   Diagnosis: Calculus of ureter [N20.1]   Pre-op diagnosis: Calculus of ureter [N20.1]   Location: [facility] OR  / [facility] OR   Surgeons: Ramirez Wei MD       Pre-op Assessment    I have reviewed the Patient Summary Reports.     I have reviewed the Nursing Notes. I have reviewed the NPO Status.   I have reviewed the Medications.     Review of Systems  Anesthesia Hx:  No problems with previous Anesthesia  Denies Family Hx of Anesthesia complications.   Denies Personal Hx of Anesthesia complications.   Social:  Former Smoker    Cardiovascular:   Hypertension    Pulmonary:  Pulmonary Normal    Renal/:   Kidney Stones   Neurological:   s/p PCDF, (3/2022) w/ decreased cervical extension/flexion   Endocrine:  Endocrine Normal        Physical Exam  General: Alert and Oriented    Airway:  Mallampati: III   Mouth Opening: Normal  TM Distance: Normal  Tongue: Normal  Neck ROM: Extension Decreased, Flexion Decreased    Dental:  Intact    Chest/Lungs:  Normal Respiratory Rate    Heart:  Rate: Normal  Rhythm: Regular Rhythm       Latest Reference Range & Units 01/11/23 11:34   Sodium 136 - 145 mmol/L 138   Potassium 3.5 - 5.1 mmol/L 4.5   Chloride 98 - 107 mmol/L 105   CO2 22 - 29 mmol/L 26   BUN 8.4 - 25.7 mg/dL 24.7   Creatinine 0.73 - 1.18 mg/dL 1.28 (H)   eGFR mls/min/1.73/m2 >60   Glucose 74 - 100 mg/dL 100   Calcium 8.4 - 10.2 mg/dL 10.0   Alkaline Phosphatase 40 - 150 unit/L 81   PROTEIN TOTAL 6.4 - 8.3 gm/dL 6.6   Albumin 3.5 - 5.0 g/dL 4.0   Albumin/Globulin Ratio 1.1 - 2.0 ratio 1.5   BILIRUBIN TOTAL <=1.5 mg/dL 0.8   AST 5 - 34 unit/L 19   ALT 0 - 55 unit/L 20   (H):  Data is abnormally high   Latest Reference Range & Units 12/22/22 04:34   Hemoglobin 14.0 - 18.0 gm/dL 14.8   Hematocrit 42.0 - 52.0 % 44.3   MCV 80.0 - 94.0 fL 89.5   MCH pg 29.9   MCHC 33.0 - 36.0 mg/dL 33.4   RDW 11.6 - 14.4 % 11.9   Platelets 140 - 371 x10(3)/mcL 211           Anesthesia Plan  Type of Anesthesia, risks & benefits discussed:    Anesthesia Type: Gen Supraglottic Airway  Intra-op Monitoring Plan: Standard ASA Monitors  Post Op Pain Control Plan: IV/PO Opioids PRN and multimodal analgesia  Induction:  IV  Airway Plan: Direct, Post-Induction  Informed Consent: Informed consent signed with the Patient and all parties understand the risks and agree with anesthesia plan.  All questions answered. Patient consented to blood products? Yes  ASA Score: 2  Day of Surgery Review of History & Physical: H&P Update referred to the surgeon/provider.    Ready For Surgery From Anesthesia Perspective.     .

## 2023-02-04 NOTE — PLAN OF CARE
Pt met discharge home criteria but still has continued nausea. Pt and family decided to go home and take prescribed nausea and pain meds at home. D/C instructions explained and given to pt and spouse, no questions or concerns expressed. Pt escorted to vehicle via w/c,wife is driving.

## 2023-02-04 NOTE — PLAN OF CARE
Paged Dr. XIN Wei for continued nausea and pt's request for another type of antiemetic. Pt denies any other discomfort at this time. Awaiting call back.

## 2023-02-07 VITALS
DIASTOLIC BLOOD PRESSURE: 80 MMHG | TEMPERATURE: 98 F | BODY MASS INDEX: 30.4 KG/M2 | RESPIRATION RATE: 15 BRPM | WEIGHT: 224.44 LBS | HEIGHT: 72 IN | HEART RATE: 63 BPM | OXYGEN SATURATION: 100 % | SYSTOLIC BLOOD PRESSURE: 129 MMHG

## 2023-02-07 NOTE — PROGRESS NOTES
"Pt states he was overly sedated and nauseous. States he was "miserable" for hours after his procedure. States nurse tried multiple times to get in contact with Dr Wei and was unable to. Informed pt follow up would be made with manager.  "

## 2023-02-10 LAB — PSYCHE PATHOLOGY RESULT: NORMAL

## 2023-03-08 ENCOUNTER — HOSPITAL ENCOUNTER (OUTPATIENT)
Dept: RADIOLOGY | Facility: HOSPITAL | Age: 53
Discharge: HOME | End: 2023-03-08
Attending: NEUROLOGICAL SURGERY
Payer: COMMERCIAL

## 2023-03-08 ENCOUNTER — OFFICE VISIT (OUTPATIENT)
Dept: NEUROSURGERY | Facility: CLINIC | Age: 53
End: 2023-03-08
Payer: COMMERCIAL

## 2023-03-08 VITALS
HEIGHT: 72 IN | BODY MASS INDEX: 31.42 KG/M2 | DIASTOLIC BLOOD PRESSURE: 88 MMHG | RESPIRATION RATE: 16 BRPM | SYSTOLIC BLOOD PRESSURE: 134 MMHG | WEIGHT: 232 LBS | HEART RATE: 59 BPM

## 2023-03-08 DIAGNOSIS — G99.2 STENOSIS OF CERVICAL SPINE WITH MYELOPATHY: Primary | ICD-10-CM

## 2023-03-08 DIAGNOSIS — M48.062 LUMBAR STENOSIS WITH NEUROGENIC CLAUDICATION: ICD-10-CM

## 2023-03-08 DIAGNOSIS — M54.16 LUMBAR RADICULOPATHY: ICD-10-CM

## 2023-03-08 DIAGNOSIS — M48.02 STENOSIS OF CERVICAL SPINE WITH MYELOPATHY: Primary | ICD-10-CM

## 2023-03-08 DIAGNOSIS — M48.02 STENOSIS OF CERVICAL SPINE WITH MYELOPATHY: ICD-10-CM

## 2023-03-08 DIAGNOSIS — G99.2 STENOSIS OF CERVICAL SPINE WITH MYELOPATHY: ICD-10-CM

## 2023-03-08 PROCEDURE — 3079F DIAST BP 80-89 MM HG: CPT | Mod: CPTII,,, | Performed by: NEUROLOGICAL SURGERY

## 2023-03-08 PROCEDURE — 3079F PR MOST RECENT DIASTOLIC BLOOD PRESSURE 80-89 MM HG: ICD-10-PCS | Mod: CPTII,,, | Performed by: NEUROLOGICAL SURGERY

## 2023-03-08 PROCEDURE — 3075F SYST BP GE 130 - 139MM HG: CPT | Mod: CPTII,,, | Performed by: NEUROLOGICAL SURGERY

## 2023-03-08 PROCEDURE — 1160F RVW MEDS BY RX/DR IN RCRD: CPT | Mod: CPTII,,, | Performed by: NEUROLOGICAL SURGERY

## 2023-03-08 PROCEDURE — 3008F BODY MASS INDEX DOCD: CPT | Mod: CPTII,,, | Performed by: NEUROLOGICAL SURGERY

## 2023-03-08 PROCEDURE — 3075F PR MOST RECENT SYSTOLIC BLOOD PRESS GE 130-139MM HG: ICD-10-PCS | Mod: CPTII,,, | Performed by: NEUROLOGICAL SURGERY

## 2023-03-08 PROCEDURE — 1159F MED LIST DOCD IN RCRD: CPT | Mod: CPTII,,, | Performed by: NEUROLOGICAL SURGERY

## 2023-03-08 PROCEDURE — 1160F PR REVIEW ALL MEDS BY PRESCRIBER/CLIN PHARMACIST DOCUMENTED: ICD-10-PCS | Mod: CPTII,,, | Performed by: NEUROLOGICAL SURGERY

## 2023-03-08 PROCEDURE — 4010F ACE/ARB THERAPY RXD/TAKEN: CPT | Mod: CPTII,,, | Performed by: NEUROLOGICAL SURGERY

## 2023-03-08 PROCEDURE — 1159F PR MEDICATION LIST DOCUMENTED IN MEDICAL RECORD: ICD-10-PCS | Mod: CPTII,,, | Performed by: NEUROLOGICAL SURGERY

## 2023-03-08 PROCEDURE — 99214 PR OFFICE/OUTPT VISIT, EST, LEVL IV, 30-39 MIN: ICD-10-PCS | Mod: ,,, | Performed by: NEUROLOGICAL SURGERY

## 2023-03-08 PROCEDURE — 4010F PR ACE/ARB THEARPY RXD/TAKEN: ICD-10-PCS | Mod: CPTII,,, | Performed by: NEUROLOGICAL SURGERY

## 2023-03-08 PROCEDURE — 99214 OFFICE O/P EST MOD 30 MIN: CPT | Mod: ,,, | Performed by: NEUROLOGICAL SURGERY

## 2023-03-08 PROCEDURE — 72052 X-RAY EXAM NECK SPINE 6/>VWS: CPT | Mod: TC

## 2023-03-08 PROCEDURE — 3008F PR BODY MASS INDEX (BMI) DOCUMENTED: ICD-10-PCS | Mod: CPTII,,, | Performed by: NEUROLOGICAL SURGERY

## 2023-03-08 RX ORDER — OXYCODONE AND ACETAMINOPHEN 10; 325 MG/1; MG/1
TABLET ORAL
COMMUNITY
Start: 2022-12-31 | End: 2023-08-15

## 2023-03-08 RX ORDER — IBUPROFEN 200 MG
200 TABLET ORAL EVERY 6 HOURS PRN
COMMUNITY

## 2023-03-08 NOTE — PROGRESS NOTES
[facility]  Neurosurgery        Eb Alfaro   [id number]   1970         CHIEF COMPLAINT:    1 year post-op    HPI:    Eb Alfaro is a 52 y.o.-year-old male who presents today for post-operative follow-up.  He is s/p  C3-5 laminectomies, bilateral C2-C6 posterior instrumentation; bilateral L4-5, L5-S1 decompression, and right L5-S1 microdiskectomy that was done on 3/8/22.  He reports much improvement in preoperative neck symptoms.  He continues with a pins/needles sensation in the right upper trapezius region that has been present since the surgery.  The pain he experienced in this area after surgery has resolved.  He denies any pain or numbness in the upper extremities.  He does not feel the arms are weak.  He complains of electrical, nerve type pain down bilateral posterior legs that began a few months after surgery.  The pain increases the further he walks.  He must stop and rest for a few minutes before he is able to continue walking.  He finds that leaning forward on something helps.  He continues to take Gabapentin 300mg twice a day.  He has not tried increasing it.  He feels the symptoms in his legs are worsening.        Review of patient's allergies indicates:   Allergen Reactions    Cefdinir Anaphylaxis    Penicillins Anaphylaxis    Hydrocodone-acetaminophen Itching    Morphine Hives    Opioids - morphine analogues Hives       Current Outpatient Medications   Medication Sig Dispense Refill    cyclobenzaprine (FLEXERIL) 10 MG tablet TAKE 1 TABLET BY MOUTH THREE TIMES DAILY AS NEEDED FOR SPASM      gabapentin (NEURONTIN) 300 MG capsule Take 1 capsule (300 mg total) by mouth 3 (three) times daily. (Patient taking differently: Take 300 mg by mouth 2 (two) times daily.) 90 capsule 5    ibuprofen (ADVIL,MOTRIN) 200 MG tablet Take 200 mg by mouth every 6 (six) hours as needed for Pain.      olmesartan (BENICAR) 5 MG Tab TAKE 1 TABLET(5 MG) BY MOUTH TWICE DAILY 180 tablet 3     orphenadrine (NORFLEX) 100 mg tablet Take 1 tablet (100 mg total) by mouth 2 (two) times daily. 60 tablet 2    rosuvastatin (CRESTOR) 10 MG tablet Take 1 tablet (10 mg total) by mouth every evening. 90 tablet 3    cholecalciferol, vitamin D3, (VITAMIN D3) 50 mcg (2,000 unit) Cap capsule Take by mouth once daily.      hyoscyamine (LEVSIN/SL) 0.125 mg Subl Place 1 tablet (0.125 mg total) under the tongue every 4 (four) hours as needed. (Patient not taking: Reported on 3/8/2023) 30 tablet 0    ondansetron (ZOFRAN-ODT) 4 MG TbDL Take 1 tablet (4 mg total) by mouth every 6 (six) hours as needed (nausea/vomiting). (Patient not taking: Reported on 3/8/2023) 30 tablet 0    oxyCODONE-acetaminophen (PERCOCET)  mg per tablet Take by mouth.      tamsulosin (FLOMAX) 0.4 mg Cap Take 1 capsule (0.4 mg total) by mouth once daily. for 14 days 14 capsule 0    traMADoL (ULTRAM) 50 mg tablet Take 1 tablet (50 mg total) by mouth every 8 (eight) hours as needed for Pain. (Patient not taking: Reported on 3/8/2023) 30 tablet 0     No current facility-administered medications for this visit.       Past Medical History:   Diagnosis Date    Degeneration, intervertebral disc, cervicothoracic     Herniated nucleus pulposus with myelopathy, cervical     High cholesterol     Hypertension     Kidney stones     Muscle spasms of neck     PONV (postoperative nausea and vomiting)     Spinal stenosis of lumbar region with neurogenic claudication     Stenosis of cervical spine with myelopathy      Past Surgical History:   Procedure Laterality Date    APPENDECTOMY      Bilateral L4-5, L5-S1 decompression; right L5-S1 microdiscectomy  03/08/2022    Dr. Rodriguez    C3-5 laminectomies, C2-C6 posterior instrumented fusion  03/08/2022    Dr. Rodriguez    CHOLECYSTECTOMY      colonocscopy      CYSTOSCOPY W/ URETERAL STENT PLACEMENT Left 12/21/2022    Procedure: CYSTOSCOPY, WITH URETERAL STENT INSERTION;  Surgeon: Ramirez Wei MD;  Location: [facility];   Service: Urology;  Laterality: Left;  LEFT URETERAL STENT PLACEMENT    CYSTOURETEROSCOPY, WITH HOLMIUM LASER LITHOTRIPSY OF URETERAL CALCULUS AND STENT INSERTION Left 2/3/2023    Procedure: CYSTOURETEROSCOPY, WITH HOLMIUM LASER LITHOTRIPSY OF URETERAL CALCULUS AND STENT INSERTION / Stent exchange Left;  Surgeon: Ramirez Wei MD;  Location: [facility] OR;  Service: Urology;  Laterality: Left;    LEFT HEART CATHETERIZATION  10/16/2017    PARATHYROIDECTOMY N/A 05/06/2022    Procedure: PARATHYROIDECTOMY;  Surgeon: Ajay Blum MD;  Location: [facility] OR;  Service: ENT;  Laterality: N/A;  PARATHYROIDECTOMY // NERVE MONITOR // FROZEN SECTION // INTRA OP PTH // SUPINE     Family History       Problem Relation (Age of Onset)    Cancer Mother, Father    Liver cancer Mother    Liver disease Sister          Social History     Socioeconomic History    Marital status:    Tobacco Use    Smoking status: Former    Smokeless tobacco: Never   Substance and Sexual Activity    Alcohol use: Yes     Alcohol/week: 2.0 standard drinks     Types: 2 Standard drinks or equivalent per week     Comment: SOCIALLY    Drug use: No       Review of systems:    Pertinent items are noted in HPI.      Vital Signs  Pulse: (!) 59  Resp: 16  BP: 134/88  Pain Score: 0-No pain  Height: 6' (182.9 cm)  Weight: 105.2 kg (232 lb)  Body mass index is 31.46 kg/m².      Physical Exam:    General:  Pleasant. Well-nourished. Alert. No acute distress.    Head:  Normocephalic, without obvious abnormality, atraumatic    Lungs:   Breathing is quiet, non-lablored    Neurological:    Oriented to Person, Place, Time   Speech:  normal  Memory, cognition, and affect are appropriate.  Motor Strength: Moves all extremities spontaneously with good tone.  No abnormal movements seen.  normal 5/5 strength in all tested muscle groups and no muscle wasting or atrophy  Conteh's is positive bilaterally    Cervical and Lumbar incision:  Well healed    Gait:  normal, able to  walk on heels and toes without difficulty      ASSESSMENT:     1. Stenosis of cervical spine with myelopathy      2. Lumbar stenosis with neurogenic claudication  - MRI Lumbar Spine Without Contrast; Future  - X-Ray Lumbar Complete Including Flex And Ext; Future    3. Lumbar radiculopathy  - MRI Lumbar Spine Without Contrast; Future  - X-Ray Lumbar Complete Including Flex And Ext; Future       - Instructed on gradually increasing Neurontin to TID, okay to continue increasing as tolerated, not to exceed 3600mg/day.  - Follow up with lumbar imaging            I, Dr. Marcelino Rodriguez, personally performed the services described in this documentation. All medical record entries made by the scribe, Karen Hanks RN, were at my direction and in my presence.  I have reviewed the chart and agree that the record reflects my personal performance and is accurate and complete. Marcelino Rodriguez MD.  1:12 PM 03/08/2023           Marcelino Rodriguez MD FACS [facility]

## 2023-03-20 ENCOUNTER — TELEPHONE (OUTPATIENT)
Dept: PRIMARY CARE CLINIC | Facility: CLINIC | Age: 53
End: 2023-03-20
Payer: COMMERCIAL

## 2023-03-20 ENCOUNTER — HOSPITAL ENCOUNTER (OUTPATIENT)
Facility: HOSPITAL | Age: 53
Discharge: HOME | End: 2023-03-23
Attending: EMERGENCY MEDICINE | Admitting: INTERNAL MEDICINE
Payer: COMMERCIAL

## 2023-03-20 DIAGNOSIS — R55 NEAR SYNCOPE: Primary | ICD-10-CM

## 2023-03-20 DIAGNOSIS — R42 DIZZINESS: ICD-10-CM

## 2023-03-20 DIAGNOSIS — G45.9 TIA (TRANSIENT ISCHEMIC ATTACK): ICD-10-CM

## 2023-03-20 DIAGNOSIS — M48.062 PSEUDOCLAUDICATION SYNDROME: ICD-10-CM

## 2023-03-20 LAB
ALBUMIN SERPL-MCNC: 4.5 G/DL (ref 3.5–5)
ALBUMIN/GLOB SERPL: 1.3 RATIO (ref 1.1–2)
ALP SERPL-CCNC: 92 UNIT/L (ref 40–150)
ALT SERPL-CCNC: 20 UNIT/L (ref 0–55)
APPEARANCE UR: CLEAR
AST SERPL-CCNC: 17 UNIT/L (ref 5–34)
BACTERIA #/AREA URNS AUTO: NORMAL /HPF
BASOPHILS # BLD AUTO: 0.06 X10(3)/MCL (ref 0–0.2)
BASOPHILS NFR BLD AUTO: 0.9 %
BILIRUB UR QL STRIP.AUTO: NEGATIVE MG/DL
BILIRUBIN DIRECT+TOT PNL SERPL-MCNC: 0.6 MG/DL
BUN SERPL-MCNC: 10.4 MG/DL (ref 8.4–25.7)
CALCIUM SERPL-MCNC: 10.1 MG/DL (ref 8.4–10.2)
CHLORIDE SERPL-SCNC: 103 MMOL/L (ref 98–107)
CO2 SERPL-SCNC: 28 MMOL/L (ref 22–29)
COLOR UR AUTO: YELLOW
CREAT SERPL-MCNC: 1.18 MG/DL (ref 0.73–1.18)
EOSINOPHIL # BLD AUTO: 0.17 X10(3)/MCL (ref 0–0.9)
EOSINOPHIL NFR BLD AUTO: 2.5 %
ERYTHROCYTE [DISTWIDTH] IN BLOOD BY AUTOMATED COUNT: 12.5 % (ref 11.5–17)
GFR SERPLBLD CREATININE-BSD FMLA CKD-EPI: >60 MLS/MIN/1.73/M2
GLOBULIN SER-MCNC: 3.4 GM/DL (ref 2.4–3.5)
GLUCOSE SERPL-MCNC: 96 MG/DL (ref 74–100)
GLUCOSE UR QL STRIP.AUTO: NEGATIVE MG/DL
HCT VFR BLD AUTO: 49.5 % (ref 42–52)
HGB BLD-MCNC: 17 G/DL (ref 14–18)
IMM GRANULOCYTES # BLD AUTO: 0.02 X10(3)/MCL (ref 0–0.04)
IMM GRANULOCYTES NFR BLD AUTO: 0.3 %
INR BLD: 1.07 (ref 0–1.3)
KETONES UR QL STRIP.AUTO: NEGATIVE MG/DL
LEUKOCYTE ESTERASE UR QL STRIP.AUTO: NEGATIVE UNIT/L
LYMPHOCYTES # BLD AUTO: 1.73 X10(3)/MCL (ref 0.6–4.6)
LYMPHOCYTES NFR BLD AUTO: 25.6 %
MCH RBC QN AUTO: 30.3 PG
MCHC RBC AUTO-ENTMCNC: 34.3 G/DL (ref 33–36)
MCV RBC AUTO: 88.2 FL (ref 80–94)
MONOCYTES # BLD AUTO: 0.46 X10(3)/MCL (ref 0.1–1.3)
MONOCYTES NFR BLD AUTO: 6.8 %
NEUTROPHILS # BLD AUTO: 4.32 X10(3)/MCL (ref 2.1–9.2)
NEUTROPHILS NFR BLD AUTO: 63.9 %
NITRITE UR QL STRIP.AUTO: NEGATIVE
NRBC BLD AUTO-RTO: 0 %
PH UR STRIP.AUTO: 6 [PH]
PLATELET # BLD AUTO: 239 X10(3)/MCL (ref 130–400)
PMV BLD AUTO: 9.5 FL (ref 7.4–10.4)
POTASSIUM SERPL-SCNC: 4.2 MMOL/L (ref 3.5–5.1)
PROT SERPL-MCNC: 7.9 GM/DL (ref 6.4–8.3)
PROT UR QL STRIP.AUTO: NEGATIVE MG/DL
PROTHROMBIN TIME: 13.8 SECONDS (ref 12.5–14.5)
RBC # BLD AUTO: 5.61 X10(6)/MCL (ref 4.7–6.1)
RBC #/AREA URNS AUTO: <5 /HPF
RBC UR QL AUTO: ABNORMAL UNIT/L
SODIUM SERPL-SCNC: 139 MMOL/L (ref 136–145)
SP GR UR STRIP.AUTO: 1.01 (ref 1–1.03)
SQUAMOUS #/AREA URNS AUTO: <5 /HPF
TROPONIN I SERPL-MCNC: <0.01 NG/ML (ref 0–0.04)
UROBILINOGEN UR STRIP-ACNC: 0.2 MG/DL
WBC # SPEC AUTO: 6.8 X10(3)/MCL (ref 4.5–11.5)
WBC #/AREA URNS AUTO: <5 /HPF

## 2023-03-20 PROCEDURE — 81001 URINALYSIS AUTO W/SCOPE: CPT | Performed by: NURSE PRACTITIONER

## 2023-03-20 PROCEDURE — 85025 COMPLETE CBC W/AUTO DIFF WBC: CPT | Performed by: NURSE PRACTITIONER

## 2023-03-20 PROCEDURE — 80053 COMPREHEN METABOLIC PANEL: CPT | Performed by: NURSE PRACTITIONER

## 2023-03-20 PROCEDURE — 93005 ELECTROCARDIOGRAM TRACING: CPT

## 2023-03-20 PROCEDURE — 84484 ASSAY OF TROPONIN QUANT: CPT | Performed by: NURSE PRACTITIONER

## 2023-03-20 PROCEDURE — 99285 EMERGENCY DEPT VISIT HI MDM: CPT | Mod: 25

## 2023-03-20 PROCEDURE — 93010 EKG 12-LEAD: ICD-10-PCS | Mod: ,,, | Performed by: STUDENT IN AN ORGANIZED HEALTH CARE EDUCATION/TRAINING PROGRAM

## 2023-03-20 PROCEDURE — 85610 PROTHROMBIN TIME: CPT | Performed by: NURSE PRACTITIONER

## 2023-03-20 PROCEDURE — 93010 ELECTROCARDIOGRAM REPORT: CPT | Mod: ,,, | Performed by: STUDENT IN AN ORGANIZED HEALTH CARE EDUCATION/TRAINING PROGRAM

## 2023-03-20 NOTE — TELEPHONE ENCOUNTER
Spoke to patient.  Reports elevated bp.  Onset today.  Dizziness-disoriented.   Patient encouraged to seek emergency attention.   Patient informed he should not drive.

## 2023-03-20 NOTE — FIRST PROVIDER EVALUATION
"Medical screening examination initiated.  I have conducted a focused provider triage encounter, findings are as follows:    Brief history of present illness:  Patient states "feeling disoriented" and blurred vision starting yesterday.     There were no vitals filed for this visit.    Pertinent physical exam:  Awake, alert, ambulatory    Brief workup plan:  labs, imaging.     Preliminary workup initiated; this workup will be continued and followed by the physician or advanced practice provider that is assigned to the patient when roomed.  "

## 2023-03-20 NOTE — TELEPHONE ENCOUNTER
----- Message from Jackie Guadalupe sent at 3/20/2023 11:38 AM CDT -----  Regarding: Same Day Appointment Request  Type:  Same Day Appointment Request    Caller is requesting a same day appointment.  Caller declined first available appointment listed below.    Name of Caller: pt  When is the first available appointment? 3/28  Symptoms: disoriented & blurred vision  Best Call Back Number: [phone]  Additional Information:  pt states he is very disoriented & has blurred vision. Feels like he wants to pass out. Wants to be seen today & does not want to go to urgent care.

## 2023-03-21 PROBLEM — R55 NEAR SYNCOPE: Status: ACTIVE | Noted: 2023-03-21

## 2023-03-21 PROBLEM — G45.9 TIA (TRANSIENT ISCHEMIC ATTACK): Status: ACTIVE | Noted: 2023-03-21

## 2023-03-21 LAB
ANION GAP SERPL CALC-SCNC: 6 MEQ/L
APPEARANCE UR: CLEAR
BACTERIA #/AREA URNS AUTO: NORMAL /HPF
BASOPHILS # BLD AUTO: 0.07 X10(3)/MCL (ref 0–0.2)
BASOPHILS NFR BLD AUTO: 1 %
BILIRUB UR QL STRIP.AUTO: NEGATIVE MG/DL
BUN SERPL-MCNC: 9.6 MG/DL (ref 8.4–25.7)
CALCIUM SERPL-MCNC: 9.2 MG/DL (ref 8.4–10.2)
CHLORIDE SERPL-SCNC: 105 MMOL/L (ref 98–107)
CHOLEST SERPL-MCNC: 152 MG/DL
CHOLEST/HDLC SERPL: 4 {RATIO} (ref 0–5)
CO2 SERPL-SCNC: 26 MMOL/L (ref 22–29)
COLOR UR AUTO: YELLOW
CREAT SERPL-MCNC: 1.21 MG/DL (ref 0.73–1.18)
CREAT/UREA NIT SERPL: 8
CRP SERPL-MCNC: 7.6 MG/L
EOSINOPHIL # BLD AUTO: 0.19 X10(3)/MCL (ref 0–0.9)
EOSINOPHIL NFR BLD AUTO: 2.8 %
ERYTHROCYTE [DISTWIDTH] IN BLOOD BY AUTOMATED COUNT: 12.6 % (ref 11.5–17)
ERYTHROCYTE [SEDIMENTATION RATE] IN BLOOD: 3 MM/HR (ref 0–15)
GFR SERPLBLD CREATININE-BSD FMLA CKD-EPI: >60 MLS/MIN/1.73/M2
GLUCOSE SERPL-MCNC: 149 MG/DL (ref 74–100)
GLUCOSE UR QL STRIP.AUTO: NEGATIVE MG/DL
HCT VFR BLD AUTO: 45.5 % (ref 42–52)
HDLC SERPL-MCNC: 40 MG/DL (ref 35–60)
HGB BLD-MCNC: 15.6 G/DL (ref 14–18)
IMM GRANULOCYTES # BLD AUTO: 0.02 X10(3)/MCL (ref 0–0.04)
IMM GRANULOCYTES NFR BLD AUTO: 0.3 %
KETONES UR QL STRIP.AUTO: NEGATIVE MG/DL
LDLC SERPL CALC-MCNC: 95 MG/DL (ref 50–140)
LEUKOCYTE ESTERASE UR QL STRIP.AUTO: NEGATIVE UNIT/L
LYMPHOCYTES # BLD AUTO: 2.1 X10(3)/MCL (ref 0.6–4.6)
LYMPHOCYTES NFR BLD AUTO: 30.9 %
MCH RBC QN AUTO: 29.9 PG
MCHC RBC AUTO-ENTMCNC: 34.3 G/DL (ref 33–36)
MCV RBC AUTO: 87.2 FL (ref 80–94)
MONOCYTES # BLD AUTO: 0.61 X10(3)/MCL (ref 0.1–1.3)
MONOCYTES NFR BLD AUTO: 9 %
NEUTROPHILS # BLD AUTO: 3.81 X10(3)/MCL (ref 2.1–9.2)
NEUTROPHILS NFR BLD AUTO: 56 %
NITRITE UR QL STRIP.AUTO: NEGATIVE
NRBC BLD AUTO-RTO: 0 %
PH UR STRIP.AUTO: 6.5 [PH]
PLATELET # BLD AUTO: 224 X10(3)/MCL (ref 130–400)
PMV BLD AUTO: 9.5 FL (ref 7.4–10.4)
POTASSIUM SERPL-SCNC: 4.3 MMOL/L (ref 3.5–5.1)
PROT UR QL STRIP.AUTO: NEGATIVE MG/DL
RBC # BLD AUTO: 5.22 X10(6)/MCL (ref 4.7–6.1)
RBC #/AREA URNS AUTO: <5 /HPF
RBC UR QL AUTO: ABNORMAL UNIT/L
SODIUM SERPL-SCNC: 137 MMOL/L (ref 136–145)
SP GR UR STRIP.AUTO: 1.01 (ref 1–1.03)
SQUAMOUS #/AREA URNS AUTO: <5 /HPF
TRIGL SERPL-MCNC: 84 MG/DL (ref 34–140)
TSH SERPL-ACNC: 2.74 UIU/ML (ref 0.35–4.94)
UROBILINOGEN UR STRIP-ACNC: 0.2 MG/DL
VLDLC SERPL CALC-MCNC: 17 MG/DL
WBC # SPEC AUTO: 6.8 X10(3)/MCL (ref 4.5–11.5)
WBC #/AREA URNS AUTO: <5 /HPF

## 2023-03-21 PROCEDURE — 85651 RBC SED RATE NONAUTOMATED: CPT | Performed by: STUDENT IN AN ORGANIZED HEALTH CARE EDUCATION/TRAINING PROGRAM

## 2023-03-21 PROCEDURE — 80061 LIPID PANEL: CPT | Performed by: INTERNAL MEDICINE

## 2023-03-21 PROCEDURE — 25000003 PHARM REV CODE 250: Performed by: INTERNAL MEDICINE

## 2023-03-21 PROCEDURE — 80048 BASIC METABOLIC PNL TOTAL CA: CPT | Performed by: STUDENT IN AN ORGANIZED HEALTH CARE EDUCATION/TRAINING PROGRAM

## 2023-03-21 PROCEDURE — 85025 COMPLETE CBC W/AUTO DIFF WBC: CPT | Performed by: STUDENT IN AN ORGANIZED HEALTH CARE EDUCATION/TRAINING PROGRAM

## 2023-03-21 PROCEDURE — G0378 HOSPITAL OBSERVATION PER HR: HCPCS

## 2023-03-21 PROCEDURE — 96361 HYDRATE IV INFUSION ADD-ON: CPT

## 2023-03-21 PROCEDURE — 63600175 PHARM REV CODE 636 W HCPCS: Performed by: INTERNAL MEDICINE

## 2023-03-21 PROCEDURE — 25000003 PHARM REV CODE 250: Performed by: EMERGENCY MEDICINE

## 2023-03-21 PROCEDURE — 92523 SPEECH SOUND LANG COMPREHEN: CPT

## 2023-03-21 PROCEDURE — 84443 ASSAY THYROID STIM HORMONE: CPT | Performed by: INTERNAL MEDICINE

## 2023-03-21 PROCEDURE — 86140 C-REACTIVE PROTEIN: CPT | Performed by: STUDENT IN AN ORGANIZED HEALTH CARE EDUCATION/TRAINING PROGRAM

## 2023-03-21 PROCEDURE — 96372 THER/PROPH/DIAG INJ SC/IM: CPT | Performed by: INTERNAL MEDICINE

## 2023-03-21 PROCEDURE — 81001 URINALYSIS AUTO W/SCOPE: CPT | Performed by: INTERNAL MEDICINE

## 2023-03-21 PROCEDURE — 25000003 PHARM REV CODE 250

## 2023-03-21 RX ORDER — LABETALOL HYDROCHLORIDE 5 MG/ML
10 INJECTION, SOLUTION INTRAVENOUS
Status: DISCONTINUED | OUTPATIENT
Start: 2023-03-21 | End: 2023-03-23 | Stop reason: HOSPADM

## 2023-03-21 RX ORDER — ASPIRIN 325 MG
325 TABLET, DELAYED RELEASE (ENTERIC COATED) ORAL
Status: COMPLETED | OUTPATIENT
Start: 2023-03-21 | End: 2023-03-21

## 2023-03-21 RX ORDER — SODIUM CHLORIDE 0.9 % (FLUSH) 0.9 %
10 SYRINGE (ML) INJECTION
Status: DISCONTINUED | OUTPATIENT
Start: 2023-03-21 | End: 2023-03-23 | Stop reason: HOSPADM

## 2023-03-21 RX ORDER — ENOXAPARIN SODIUM 100 MG/ML
40 INJECTION SUBCUTANEOUS EVERY 24 HOURS
Status: DISCONTINUED | OUTPATIENT
Start: 2023-03-21 | End: 2023-03-23 | Stop reason: HOSPADM

## 2023-03-21 RX ORDER — GABAPENTIN 300 MG/1
300 CAPSULE ORAL 2 TIMES DAILY
Status: DISCONTINUED | OUTPATIENT
Start: 2023-03-21 | End: 2023-03-23 | Stop reason: HOSPADM

## 2023-03-21 RX ORDER — SODIUM CHLORIDE 9 MG/ML
INJECTION, SOLUTION INTRAVENOUS CONTINUOUS
Status: DISCONTINUED | OUTPATIENT
Start: 2023-03-21 | End: 2023-03-22

## 2023-03-21 RX ORDER — ASPIRIN 325 MG
325 TABLET, DELAYED RELEASE (ENTERIC COATED) ORAL DAILY
Status: DISCONTINUED | OUTPATIENT
Start: 2023-03-22 | End: 2023-03-23

## 2023-03-21 RX ORDER — ASPIRIN 325 MG
325 TABLET, DELAYED RELEASE (ENTERIC COATED) ORAL DAILY
Status: DISCONTINUED | OUTPATIENT
Start: 2023-03-21 | End: 2023-03-21

## 2023-03-21 RX ORDER — MECLIZINE HYDROCHLORIDE 25 MG/1
25 TABLET ORAL
Status: COMPLETED | OUTPATIENT
Start: 2023-03-21 | End: 2023-03-21

## 2023-03-21 RX ADMIN — ASPIRIN 325 MG: 325 TABLET, COATED ORAL at 04:03

## 2023-03-21 RX ADMIN — MECLIZINE HYDROCHLORIDE 25 MG: 25 TABLET ORAL at 02:03

## 2023-03-21 RX ADMIN — SODIUM CHLORIDE: 9 INJECTION, SOLUTION INTRAVENOUS at 05:03

## 2023-03-21 RX ADMIN — ENOXAPARIN SODIUM 40 MG: 40 INJECTION SUBCUTANEOUS at 05:03

## 2023-03-21 RX ADMIN — GABAPENTIN 300 MG: 300 CAPSULE ORAL at 08:03

## 2023-03-21 RX ADMIN — ASPIRIN 325 MG: 325 TABLET, COATED ORAL at 05:03

## 2023-03-21 RX ADMIN — LOSARTAN POTASSIUM 12.5 MG: 25 TABLET, FILM COATED ORAL at 04:03

## 2023-03-21 NOTE — ED NOTES
spoke to amaris with neuro to confirm that patient having MRI tomorrow would not be a problem. she said that as far as she is concerned, MRI tomorrow will be okay being that patient has been having the symptoms since sunday

## 2023-03-21 NOTE — SUBJECTIVE & OBJECTIVE
Past Medical History:   Diagnosis Date    Degeneration, intervertebral disc, cervicothoracic     Herniated nucleus pulposus with myelopathy, cervical     High cholesterol     Hypertension     Kidney stones     Muscle spasms of neck     PONV (postoperative nausea and vomiting)     Spinal stenosis of lumbar region with neurogenic claudication     Stenosis of cervical spine with myelopathy        Past Surgical History:   Procedure Laterality Date    APPENDECTOMY      Bilateral L4-5, L5-S1 decompression; right L5-S1 microdiscectomy  03/08/2022    Dr. Rodriguez    C3-5 laminectomies, C2-C6 posterior instrumented fusion  03/08/2022    Dr. Rodriguez    CHOLECYSTECTOMY      colonocscopy      CYSTOSCOPY W/ URETERAL STENT PLACEMENT Left 12/21/2022    Procedure: CYSTOSCOPY, WITH URETERAL STENT INSERTION;  Surgeon: Ramirez Wei MD;  Location: [facility] OR;  Service: Urology;  Laterality: Left;  LEFT URETERAL STENT PLACEMENT    CYSTOURETEROSCOPY, WITH HOLMIUM LASER LITHOTRIPSY OF URETERAL CALCULUS AND STENT INSERTION Left 2/3/2023    Procedure: CYSTOURETEROSCOPY, WITH HOLMIUM LASER LITHOTRIPSY OF URETERAL CALCULUS AND STENT INSERTION / Stent exchange Left;  Surgeon: Ramirez Wei MD;  Location: [facility] OR;  Service: Urology;  Laterality: Left;    LEFT HEART CATHETERIZATION  10/16/2017    PARATHYROIDECTOMY N/A 05/06/2022    Procedure: PARATHYROIDECTOMY;  Surgeon: Ajay Blum MD;  Location: [facility];  Service: ENT;  Laterality: N/A;  PARATHYROIDECTOMY // NERVE MONITOR // FROZEN SECTION // INTRA OP PTH // SUPINE       Review of patient's allergies indicates:   Allergen Reactions    Cefdinir Anaphylaxis    Penicillins Anaphylaxis    Hydrocodone-acetaminophen Itching    Morphine Hives    Opioids - morphine analogues Hives       Current Neurological Medications:     No current facility-administered medications on file prior to encounter.     Current Outpatient Medications on File Prior to Encounter   Medication Sig    gabapentin  (NEURONTIN) 300 MG capsule Take 1 capsule (300 mg total) by mouth 3 (three) times daily. (Patient taking differently: Take 300 mg by mouth 2 (two) times daily.)    olmesartan (BENICAR) 5 MG Tab TAKE 1 TABLET(5 MG) BY MOUTH TWICE DAILY    orphenadrine (NORFLEX) 100 mg tablet Take 1 tablet (100 mg total) by mouth 2 (two) times daily.    rosuvastatin (CRESTOR) 10 MG tablet Take 1 tablet (10 mg total) by mouth every evening.    cholecalciferol, vitamin D3, (VITAMIN D3) 50 mcg (2,000 unit) Cap capsule Take by mouth once daily.    cyclobenzaprine (FLEXERIL) 10 MG tablet TAKE 1 TABLET BY MOUTH THREE TIMES DAILY AS NEEDED FOR SPASM    hyoscyamine (LEVSIN/SL) 0.125 mg Subl Place 1 tablet (0.125 mg total) under the tongue every 4 (four) hours as needed. (Patient not taking: Reported on 3/8/2023)    ibuprofen (ADVIL,MOTRIN) 200 MG tablet Take 200 mg by mouth every 6 (six) hours as needed for Pain.    ondansetron (ZOFRAN-ODT) 4 MG TbDL Take 1 tablet (4 mg total) by mouth every 6 (six) hours as needed (nausea/vomiting). (Patient not taking: Reported on 3/8/2023)    oxyCODONE-acetaminophen (PERCOCET)  mg per tablet Take by mouth.    tamsulosin (FLOMAX) 0.4 mg Cap Take 1 capsule (0.4 mg total) by mouth once daily. for 14 days    traMADoL (ULTRAM) 50 mg tablet Take 1 tablet (50 mg total) by mouth every 8 (eight) hours as needed for Pain. (Patient not taking: Reported on 3/8/2023)     Family History       Problem Relation (Age of Onset)    Cancer Mother, Father    Liver cancer Mother    Liver disease Sister          Tobacco Use    Smoking status: Former    Smokeless tobacco: Never   Substance and Sexual Activity    Alcohol use: Yes     Alcohol/week: 2.0 standard drinks     Types: 2 Standard drinks or equivalent per week     Comment: SOCIALLY    Drug use: No    Sexual activity: Not on file     Review of Systems   All other systems reviewed and are negative.  Objective:     Vital Signs (Most Recent):  Temp: 98 °F (36.7 °C)  (23 1308)  Pulse: (!) 56 (23 1245)  Resp: 15 (23 1245)  BP: 129/66 (23 1245)  SpO2: 98 % (23 1245)   Vital Signs (24h Range):  Pulse:  [56-67] 56  Resp:  [15] 15  SpO2:  [97 %-98 %] 98 %  BP: (129-153)/(66-97) 129/66     Weight: 104.3 kg (230 lb)  Body mass index is 31.19 kg/m².    Physical Exam  HENT:      Nose: Nose normal.   Eyes:      Extraocular Movements: Extraocular movements intact and EOM normal.      Conjunctiva/sclera: Conjunctivae normal.      Pupils: Pupils are equal, round, and reactive to light.   Cardiovascular:      Rate and Rhythm: Normal rate.   Abdominal:      Palpations: Abdomen is soft.   Musculoskeletal:         General: Normal range of motion.      Cervical back: Normal range of motion.   Skin:     Capillary Refill: Capillary refill takes less than 2 seconds.   Neurological:      Mental Status: He is alert and oriented to person, place, and time.      Coordination: Finger-Nose-Finger Test normal.      Gait: Gait is intact.   Psychiatric:         Mood and Affect: Mood normal.         Speech: Speech normal.       NEUROLOGICAL EXAMINATION:     MENTAL STATUS   Oriented to person, place, and time.   Speech: speech is normal   Level of consciousness: alert    CRANIAL NERVES     CN II   Visual fields full to confrontation.     CN III, IV, VI   Pupils are equal, round, and reactive to light.  Extraocular motions are normal.     CN V   Facial sensation intact.     CN VII   Facial expression full, symmetric.     CN VIII   CN VIII normal.     CN IX, X   CN IX normal.     CN XI   CN XI normal.     CN XII   CN XII normal.     MOTOR EXAM   Muscle bulk: normal  Overall muscle tone: normal    Strength   Right deltoid: 5/5  Left deltoid: 5/5  Right biceps: 5/5  Left biceps: 5/5  Right quadriceps: 5/5  Left quadriceps: 5/5  Right hamstrin/5  Left hamstrin/5    SENSORY EXAM   Light touch normal.   Proprioception normal.     GAIT AND COORDINATION     Gait  Gait: normal      Coordination   Finger to nose coordination: normal    Significant Labs: CBC:   Recent Labs   Lab 03/20/23  1330 03/21/23  1246   WBC 6.8 6.8   HGB 17.0 15.6   HCT 49.5 45.5    224     CMP:   Recent Labs   Lab 03/20/23  1330 03/21/23  0518    137   K 4.2 4.3   CO2 28 26   BUN 10.4 9.6   CREATININE 1.18 1.21*   CALCIUM 10.1 9.2   ALBUMIN 4.5  --    BILITOT 0.6  --    ALKPHOS 92  --    AST 17  --    ALT 20  --        Significant Imaging: I have reviewed all pertinent imaging results/findings within the past 24 hours.

## 2023-03-21 NOTE — PT/OT/SLP EVAL
Speech Language Pathology Department  Cognitive-Communication Evaluation    Patient Name:  Eb Alfaro   MRN:  [id number]    Recommendations:     General recommendations:  SLP intervention not indicated  Communication strategies:  none    History:     Eb Alfaro is a/n 52 y.o. male admitted for CVA workup.    Past Medical History:   Diagnosis Date    Degeneration, intervertebral disc, cervicothoracic     Herniated nucleus pulposus with myelopathy, cervical     High cholesterol     Hypertension     Kidney stones     Muscle spasms of neck     PONV (postoperative nausea and vomiting)     Spinal stenosis of lumbar region with neurogenic claudication     Stenosis of cervical spine with myelopathy      Past Surgical History:   Procedure Laterality Date    APPENDECTOMY      Bilateral L4-5, L5-S1 decompression; right L5-S1 microdiscectomy  03/08/2022    Dr. Rodriguez    C3-5 laminectomies, C2-C6 posterior instrumented fusion  03/08/2022    Dr. Rodriguez    CHOLECYSTECTOMY      colonocscopy      CYSTOSCOPY W/ URETERAL STENT PLACEMENT Left 12/21/2022    Procedure: CYSTOSCOPY, WITH URETERAL STENT INSERTION;  Surgeon: Ramirez Wei MD;  Location: [facility];  Service: Urology;  Laterality: Left;  LEFT URETERAL STENT PLACEMENT    CYSTOURETEROSCOPY, WITH HOLMIUM LASER LITHOTRIPSY OF URETERAL CALCULUS AND STENT INSERTION Left 2/3/2023    Procedure: CYSTOURETEROSCOPY, WITH HOLMIUM LASER LITHOTRIPSY OF URETERAL CALCULUS AND STENT INSERTION / Stent exchange Left;  Surgeon: Ramirez Wei MD;  Location: [facility];  Service: Urology;  Laterality: Left;    LEFT HEART CATHETERIZATION  10/16/2017    PARATHYROIDECTOMY N/A 05/06/2022    Procedure: PARATHYROIDECTOMY;  Surgeon: Ajay Blum MD;  Location: [facility];  Service: ENT;  Laterality: N/A;  PARATHYROIDECTOMY // NERVE MONITOR // FROZEN SECTION // INTRA OP PTH // SUPINE       Previous level of Function  Education:high school  Occupation: full time job  Lives: with  spouse  Handed: Left  Glasses: no  Hearing Aids: no    Subjective     Patient awake, alert, and oriented x4 .  Spiritual/Cultural/Advent Beliefs/Practices that affect care: no  Pain/Comfort: Pain Rating 1: 0/10  Respiratory Status: room air    Objective:     SPEECH PRODUCTION  Phoneme Production: wfl  Voice Quality: wfl  Voice Production: wfl  Speech Rate: wfl  Loudness: wfl  Respiration: wfl  Speech Intelligibility  Known Context: Greater that 90%  Unknown Context: Greater that 90%    AUDITORY COMPREHENSION  Identification:  Body parts: 100%  Objects: 10%  Following Directions:  1-Step: 100%  2-Step: 100%  Yes/No Questions:  Biographical: 100%  Environmental: 100%  Simple: 100%  Complex: 100%    VERBAL EXPRESSION  Automatic Speech:  Functional needs: 100%  Days of the week: 100%  Months of the year: 100%  Repetition:  Phonemes: 100%  Single syllable words: 100%  Bi-syllabic words: 100%  Confrontation Naming  Body Parts: 100%  Objects: 100%  Wh- Questions:  Object name: 100%  Object function: 100%    COGNITION  Orientation:  Person: 100%  Place: 100%  Time: 100%  Situation: 100%  Memory:  Immediate: 100%  Delayed: 100%  Short Term: 100%  Problem Solving  Functional simple: 100%  Functional complex: 100%  Organization:  Convergent thinkin%  Divergent thinkin%  Executive Function:  Attention to detail: 100%  Awareness: 100%  Cognitive endurance: 100%    Assessment:     Pt presents with functional speech and language skills, cognitive linguistic skills note to be at baseline. No skilled SLP intervention is warranted at this time.     Goals:   Multidisciplinary Problems       SLP Goals       Not on file                  Patient Education:     Patient provided with verbal education regarding POC.  Understanding was verbalized.    Plan:     SLP Follow-Up:      Patient to be seen:      Plan of Care expires:     Plan of Care reviewed with:         Time Tracking:     SLP Treatment Date:    3/21/23  Speech  Start Time:   0845  Speech Stop Time:     0900   Speech Total Time (min):   15    Billable minutes:  Evaluation of Speech Sound Production with Comprehension and Expression, 15      03/21/2023

## 2023-03-21 NOTE — H&P
[facility] Hospital Medicine History & Physical Examination       Patient Name: Eb Alfaro  MRN: [id number]  Patient Class: OP- Observation   Admission Date: 3/20/2023  1:14 PM  Length of Stay: 0  Admitting Service: Hospital Medicine   Attending Physician: Yash Murphy MD   Primary Care Provider: Nick Monzon MD  History source: EMR, patient and/or patient's family    CHIEF COMPLAINT   Dizziness (Patient reports dizziness and blurred vision since yesterday)    HISTORY OF PRESENT ILLNESS:   Patient is a pleasant 52-year-old male with history of hypertension and chronic back pain who presented to the ER for dizziness that started Sunday.  Patient reports he had relatively abrupt onset of decreased visual acuity and significant dizziness that made him have to take rest for some time on Sunday.  On Monday attempted to go to work but quickly throughout the course of the morning started developed severe dizziness again therefore presented to the ER.  Was afebrile hemodynamically stable on arrival.  CT of his head showed no acute process and laboratory work was unremarkable.  Out of concern for posterior circulation CVA hospitalist service was consulted for admission.      Patient reports he can only undergo MRIs under anesthesia and is followed by Dr. Rodriguez for chronic back pain and has an upcoming MRI lumbar spine scheduled early April and asked if this can be done at the same time.  Of note his symptoms did not respond to meclizine in the ER.    PAST MEDICAL HISTORY:     Past Medical History:   Diagnosis Date    Degeneration, intervertebral disc, cervicothoracic     Herniated nucleus pulposus with myelopathy, cervical     High cholesterol     Hypertension     Kidney stones     Muscle spasms of neck     PONV (postoperative nausea and vomiting)     Spinal stenosis of lumbar region with neurogenic claudication     Stenosis of cervical spine with myelopathy        PAST  SURGICAL HISTORY:     Past Surgical History:   Procedure Laterality Date    APPENDECTOMY      Bilateral L4-5, L5-S1 decompression; right L5-S1 microdiscectomy  03/08/2022    Dr. Rodriguez    C3-5 laminectomies, C2-C6 posterior instrumented fusion  03/08/2022    Dr. Rodriguez    CHOLECYSTECTOMY      colonocscopy      CYSTOSCOPY W/ URETERAL STENT PLACEMENT Left 12/21/2022    Procedure: CYSTOSCOPY, WITH URETERAL STENT INSERTION;  Surgeon: Ramirez Wei MD;  Location: [facility];  Service: Urology;  Laterality: Left;  LEFT URETERAL STENT PLACEMENT    CYSTOURETEROSCOPY, WITH HOLMIUM LASER LITHOTRIPSY OF URETERAL CALCULUS AND STENT INSERTION Left 2/3/2023    Procedure: CYSTOURETEROSCOPY, WITH HOLMIUM LASER LITHOTRIPSY OF URETERAL CALCULUS AND STENT INSERTION / Stent exchange Left;  Surgeon: Ramirez Wei MD;  Location: [facility] OR;  Service: Urology;  Laterality: Left;    LEFT HEART CATHETERIZATION  10/16/2017    PARATHYROIDECTOMY N/A 05/06/2022    Procedure: PARATHYROIDECTOMY;  Surgeon: Ajay Blum MD;  Location: [facility];  Service: ENT;  Laterality: N/A;  PARATHYROIDECTOMY // NERVE MONITOR // FROZEN SECTION // INTRA OP PTH // SUPINE       ALLERGIES:   Cefdinir, Penicillins, Hydrocodone-acetaminophen, Morphine, and Opioids - morphine analogues    FAMILY HISTORY:   Reviewed and non-contributory     SOCIAL HISTORY:     Social History     Tobacco Use    Smoking status: Former    Smokeless tobacco: Never   Substance Use Topics    Alcohol use: Yes     Alcohol/week: 2.0 standard drinks     Types: 2 Standard drinks or equivalent per week     Comment: SOCIALLY        HOME MEDICATIONS:     Prior to Admission medications    Medication Sig Start Date End Date Taking? Authorizing Provider   cholecalciferol, vitamin D3, (VITAMIN D3) 50 mcg (2,000 unit) Cap capsule Take by mouth once daily.    Historical Provider   cyclobenzaprine (FLEXERIL) 10 MG tablet TAKE 1 TABLET BY MOUTH THREE TIMES DAILY AS NEEDED FOR SPASM 4/17/22    Historical Provider   gabapentin (NEURONTIN) 300 MG capsule Take 1 capsule (300 mg total) by mouth 3 (three) times daily.  Patient taking differently: Take 300 mg by mouth 2 (two) times daily. 6/13/22 6/13/23  Davida Xiong, AGAP-BC   hyoscyamine (LEVSIN/SL) 0.125 mg Subl Place 1 tablet (0.125 mg total) under the tongue every 4 (four) hours as needed.  Patient not taking: Reported on 3/8/2023 2/3/23   Ramirez Wei MD   ibuprofen (ADVIL,MOTRIN) 200 MG tablet Take 200 mg by mouth every 6 (six) hours as needed for Pain.    Historical Provider   olmesartan (BENICAR) 5 MG Tab TAKE 1 TABLET(5 MG) BY MOUTH TWICE DAILY 1/19/23   Nick Monzon MD   ondansetron (ZOFRAN-ODT) 4 MG TbDL Take 1 tablet (4 mg total) by mouth every 6 (six) hours as needed (nausea/vomiting).  Patient not taking: Reported on 3/8/2023 12/19/22   Aster Sewell MD   orphenadrine (NORFLEX) 100 mg tablet Take 1 tablet (100 mg total) by mouth 2 (two) times daily. 8/4/22   Marcelino Rodriguez MD   oxyCODONE-acetaminophen (PERCOCET)  mg per tablet Take by mouth. 12/31/22   Historical Provider   rosuvastatin (CRESTOR) 10 MG tablet Take 1 tablet (10 mg total) by mouth every evening. 12/12/22 12/12/23  Nick Monzon MD   tamsulosin (FLOMAX) 0.4 mg Cap Take 1 capsule (0.4 mg total) by mouth once daily. for 14 days 12/19/22 1/2/23  Aster Sewell MD   traMADoL (ULTRAM) 50 mg tablet Take 1 tablet (50 mg total) by mouth every 8 (eight) hours as needed for Pain.  Patient not taking: Reported on 3/8/2023 1/12/23   Nick Monzon MD       REVIEW OF SYSTEMS:   Except as documented, all other systems reviewed and negative     PHYSICAL EXAM:   T 98 °F (36.7 °C)   BP (!) 153/97   P 67   RR 15   O2 97 %  GENERAL: awake, alert, oriented and in no acute distress, non-toxic appearing   HEENT: normocephalic atraumatic   NECK: supple   LUNGS: Clear bilaterally, no wheezing or rales, no accessory muscle use   CVS: Regular rate and rhythm, normal  peripheral perfusion  ABD: Soft, non-tender, non-distended, bowel sounds present  EXTREMITIES: no clubbing or cyanosis  SKIN: Warm, dry.   NEURO: alert and oriented, grossly without focal deficits   PSYCHIATRIC: Cooperative    LABS AND IMAGING:     Recent Labs     03/20/23  1330   WBC 6.8   RBC 5.61   HGB 17.0   HCT 49.5   MCV 88.2   MCH 30.3   MCHC 34.3   RDW 12.5        No results for input(s): LACTIC in the last 72 hours.  Recent Labs     03/20/23  1330   INR 1.07     No results for input(s): HGBA1C, CHOL, TRIG, LDL, VLDL, HDL in the last 72 hours.   Recent Labs     03/20/23  1330      K 4.2   CHLORIDE 103   CO2 28   BUN 10.4   CREATININE 1.18   GLUCOSE 96   CALCIUM 10.1   ALBUMIN 4.5   GLOBULIN 3.4   ALKPHOS 92   ALT 20   AST 17   BILITOT 0.6     Recent Labs     03/20/23  1330   TROPONINI <0.010          CT Head Without Contrast  Narrative: EXAMINATION:  CT HEAD WITHOUT CONTRAST    CLINICAL HISTORY:  Altered mental status    TECHNIQUE:  Axial CT images were obtained through the head without contrast.  Coronal and sagittal reformations were also performed.  Total DLP 1138.  Automated exposure control utilized.    COMPARISON:  07/19/2017    FINDINGS:  No hemorrhage, mass effect or CT evidence of acute cortical infarction.  Brain parenchyma has a normal attenuation.  Ventricles and sulci are proportionate.    No abnormal extra-axial fluid collections.    No hyperdense artery or vein.    No skull fracture or aggressive osseous process.  Visualized paranasal sinuses and mastoid air cells are clear.  Impression: No acute intracranial findings.    Electronically signed by: Cisco Sears MD  Date:    03/20/2023  Time:    14:24      ASSESSMENT & PLAN:   Acute onset dizziness and blurry vision, rule out posterior CVA   HX:  HTN, peripheral neuropathy, degenerative joint disease of the spine    - will need MRI brain with anesthesia, adding on lumbar spine so that he does not have to undergo anesthesia twice as  he has a upcoming lumbar MRI scheduled for Dr. Rodriguez in early April  - stroke protocol, q.4 hours neuro checks and telemetry monitoring  - carotid ultrasound an echocardiogram  - permissive hypertension with parameters  - aspirin, statin at discharge  - neurology consultation    DVT prophylaxis: lovenox  Code status: full     If patient was admitted under observational status it is with my approval/permission.     At least 55 min was spent on this history and physical.  Time seen: 5 AM   Yash Murphy MD

## 2023-03-21 NOTE — ASSESSMENT & PLAN NOTE
TIA verus CVA    Rule out stroke    Stroke workup   -CTh: negative  -MRI brain: pending   -CTA h/n: not done  -ECHO: pending  -CUS: negative  -LDL: 95  -A1c: not done  -ESR: 3  -CRP: 7.6  -TSH: 2.737  -home medications: rosuvastatin 10 mg, no home aspirin    -Continue   -Continue home rosuvastatin  -MRI scheduled 03/21 with assistance from anesthesiology  - Okay to have regular diet for dinner. Lexus negative, orders to keep NPO after midnight for anethesia.   -further recommendations to follow per MD

## 2023-03-21 NOTE — CONSULTS
[facility] - Emergency Dept  Neurology  Consult Note    Patient Name: Eb Alfaro  MRN: [id number]  Admission Date: 3/20/2023  Hospital Length of Stay: 0 days  Code Status: Full Code   Attending Provider: Yash Murphy MD   Consulting Provider: Huong Barillas NP  Primary Care Physician: Nick Monzon MD  Principal Problem:Near syncope    Inpatient consult to Neurology  Consult performed by: Huong Barillas NP  Consult ordered by: Anton Bishop MD         Subjective:     Chief Complaint:    Chief Complaint   Patient presents with    Dizziness     Patient reports dizziness and blurred vision since yesterday          HPI:   Ozzy Alfaro is a 52 year-old male with PMH of HTN presenting lightheadedness/dizziness,HA, and blurred vision that started Danial 3/19 while visiting with his children. Daughter , reportedly checked his blood pressure and blood sugar and both were okay  Per notes, patient stated that he felt better when lying down, but still positive for blurred vision and HA. He experienced the same symptoms this morning prior to going to work and decided to come to the ER. Patient was afebrile, NSR on EKG, without any significant labs, and hemodynamically stable upon admit. Neurology consulted for further evaluation for concern posterior CVA.        Past Medical History:   Diagnosis Date    Degeneration, intervertebral disc, cervicothoracic     Herniated nucleus pulposus with myelopathy, cervical     High cholesterol     Hypertension     Kidney stones     Muscle spasms of neck     PONV (postoperative nausea and vomiting)     Spinal stenosis of lumbar region with neurogenic claudication     Stenosis of cervical spine with myelopathy        Past Surgical History:   Procedure Laterality Date    APPENDECTOMY      Bilateral L4-5, L5-S1 decompression; right L5-S1 microdiscectomy  03/08/2022    Dr. Rodriguez    C3-5 laminectomies, C2-C6 posterior instrumented fusion   03/08/2022    Dr. Rodriguez    CHOLECYSTECTOMY      colonocscopy      CYSTOSCOPY W/ URETERAL STENT PLACEMENT Left 12/21/2022    Procedure: CYSTOSCOPY, WITH URETERAL STENT INSERTION;  Surgeon: Ramirez Wei MD;  Location: [facility] OR;  Service: Urology;  Laterality: Left;  LEFT URETERAL STENT PLACEMENT    CYSTOURETEROSCOPY, WITH HOLMIUM LASER LITHOTRIPSY OF URETERAL CALCULUS AND STENT INSERTION Left 2/3/2023    Procedure: CYSTOURETEROSCOPY, WITH HOLMIUM LASER LITHOTRIPSY OF URETERAL CALCULUS AND STENT INSERTION / Stent exchange Left;  Surgeon: Ramirez Wei MD;  Location: [facility] OR;  Service: Urology;  Laterality: Left;    LEFT HEART CATHETERIZATION  10/16/2017    PARATHYROIDECTOMY N/A 05/06/2022    Procedure: PARATHYROIDECTOMY;  Surgeon: Ajay Blum MD;  Location: [facility] OR;  Service: ENT;  Laterality: N/A;  PARATHYROIDECTOMY // NERVE MONITOR // FROZEN SECTION // INTRA OP PTH // SUPINE       Review of patient's allergies indicates:   Allergen Reactions    Cefdinir Anaphylaxis    Penicillins Anaphylaxis    Hydrocodone-acetaminophen Itching    Morphine Hives    Opioids - morphine analogues Hives       Current Neurological Medications:     No current facility-administered medications on file prior to encounter.     Current Outpatient Medications on File Prior to Encounter   Medication Sig    gabapentin (NEURONTIN) 300 MG capsule Take 1 capsule (300 mg total) by mouth 3 (three) times daily. (Patient taking differently: Take 300 mg by mouth 2 (two) times daily.)    olmesartan (BENICAR) 5 MG Tab TAKE 1 TABLET(5 MG) BY MOUTH TWICE DAILY    orphenadrine (NORFLEX) 100 mg tablet Take 1 tablet (100 mg total) by mouth 2 (two) times daily.    rosuvastatin (CRESTOR) 10 MG tablet Take 1 tablet (10 mg total) by mouth every evening.    cholecalciferol, vitamin D3, (VITAMIN D3) 50 mcg (2,000 unit) Cap capsule Take by mouth once daily.    cyclobenzaprine (FLEXERIL) 10 MG tablet TAKE 1 TABLET BY MOUTH THREE  TIMES DAILY AS NEEDED FOR SPASM    hyoscyamine (LEVSIN/SL) 0.125 mg Subl Place 1 tablet (0.125 mg total) under the tongue every 4 (four) hours as needed. (Patient not taking: Reported on 3/8/2023)    ibuprofen (ADVIL,MOTRIN) 200 MG tablet Take 200 mg by mouth every 6 (six) hours as needed for Pain.    ondansetron (ZOFRAN-ODT) 4 MG TbDL Take 1 tablet (4 mg total) by mouth every 6 (six) hours as needed (nausea/vomiting). (Patient not taking: Reported on 3/8/2023)    oxyCODONE-acetaminophen (PERCOCET)  mg per tablet Take by mouth.    tamsulosin (FLOMAX) 0.4 mg Cap Take 1 capsule (0.4 mg total) by mouth once daily. for 14 days    traMADoL (ULTRAM) 50 mg tablet Take 1 tablet (50 mg total) by mouth every 8 (eight) hours as needed for Pain. (Patient not taking: Reported on 3/8/2023)     Family History       Problem Relation (Age of Onset)    Cancer Mother, Father    Liver cancer Mother    Liver disease Sister          Tobacco Use    Smoking status: Former    Smokeless tobacco: Never   Substance and Sexual Activity    Alcohol use: Yes     Alcohol/week: 2.0 standard drinks     Types: 2 Standard drinks or equivalent per week     Comment: SOCIALLY    Drug use: No    Sexual activity: Not on file     Review of Systems   All other systems reviewed and are negative.  Objective:     Vital Signs (Most Recent):  Temp: 98 °F (36.7 °C) (03/20/23 1308)  Pulse: (!) 56 (03/21/23 1245)  Resp: 15 (03/21/23 1245)  BP: 129/66 (03/21/23 1245)  SpO2: 98 % (03/21/23 1245)   Vital Signs (24h Range):  Pulse:  [56-67] 56  Resp:  [15] 15  SpO2:  [97 %-98 %] 98 %  BP: (129-153)/(66-97) 129/66     Weight: 104.3 kg (230 lb)  Body mass index is 31.19 kg/m².    Physical Exam  HENT:      Nose: Nose normal.   Eyes:      Extraocular Movements: Extraocular movements intact and EOM normal.      Conjunctiva/sclera: Conjunctivae normal.      Pupils: Pupils are equal, round, and reactive to light.   Cardiovascular:      Rate and Rhythm: Normal  rate.   Abdominal:      Palpations: Abdomen is soft.   Musculoskeletal:         General: Normal range of motion.      Cervical back: Normal range of motion.   Skin:     Capillary Refill: Capillary refill takes less than 2 seconds.   Neurological:      Mental Status: He is alert and oriented to person, place, and time.      Coordination: Finger-Nose-Finger Test normal.      Gait: Gait is intact.   Psychiatric:         Mood and Affect: Mood normal.         Speech: Speech normal.       NEUROLOGICAL EXAMINATION:     MENTAL STATUS   Oriented to person, place, and time.   Speech: speech is normal   Level of consciousness: alert    CRANIAL NERVES     CN II   Visual fields full to confrontation.     CN III, IV, VI   Pupils are equal, round, and reactive to light.  Extraocular motions are normal.     CN V   Facial sensation intact.     CN VII   Facial expression full, symmetric.     CN VIII   CN VIII normal.     CN IX, X   CN IX normal.     CN XI   CN XI normal.     CN XII   CN XII normal.     MOTOR EXAM   Muscle bulk: normal  Overall muscle tone: normal    Strength   Right deltoid: 5/5  Left deltoid: 5/5  Right biceps: 5/5  Left biceps: 5/5  Right quadriceps: 5/5  Left quadriceps: 5/5  Right hamstrin/5  Left hamstrin/5    SENSORY EXAM   Light touch normal.   Proprioception normal.     GAIT AND COORDINATION     Gait  Gait: normal     Coordination   Finger to nose coordination: normal    Significant Labs: CBC:   Recent Labs   Lab 23  1330 23  1246   WBC 6.8 6.8   HGB 17.0 15.6   HCT 49.5 45.5    224     CMP:   Recent Labs   Lab 23  1330 23  0518    137   K 4.2 4.3   CO2 28 26   BUN 10.4 9.6   CREATININE 1.18 1.21*   CALCIUM 10.1 9.2   ALBUMIN 4.5  --    BILITOT 0.6  --    ALKPHOS 92  --    AST 17  --    ALT 20  --        Significant Imaging: I have reviewed all pertinent imaging results/findings within the past 24 hours.    Assessment and Plan:     TIA (transient ischemic  attack)  TIA verus CVA    Rule out stroke    Stroke workup   -CTh: negative  -MRI brain: pending   -CTA h/n: not done  -ECHO: pending  -CUS: negative  -LDL: 95  -A1c: not done  -ESR: 3  -CRP: 7.6  -TSH: 2.737  -home medications: rosuvastatin 10 mg, no home aspirin    -Continue   -Continue home rosuvastatin  -MRI scheduled 03/21 with assistance from anesthesiology  - Okay to have regular diet for dinner. Lexus negative, orders to keep NPO after midnight for anethesia.   -further recommendations to follow per MD        VTE Risk Mitigation (From admission, onward)         Ordered     enoxaparin injection 40 mg  Daily         03/21/23 0505     IP VTE HIGH RISK PATIENT  Once         03/21/23 0505     Place sequential compression device  Until discontinued         03/21/23 0505                Thank you for your consult. I will follow-up with patient. Please contact us if you have any additional questions.    Huong Barillas NP  Neurology  [facility] - Emergency Dept

## 2023-03-21 NOTE — ED PROVIDER NOTES
"Encounter Date: 3/20/2023       History     Chief Complaint   Patient presents with    Dizziness     Patient reports dizziness and blurred vision since yesterday     53 yo male with PMH of HTN presenting lightheadedness/dizziness that started Danial 3/19 while visiting with his children.  He states his daughter checked his blood pressure and his blood sugar and both were okay.  Patient when laying down says he felt somewhat better but still has blurred vision, headache and feels somewhat disoriented "cagoo".  Denies chest pain, weakness, shortness of breath, fever.    The history is provided by the patient.   Review of patient's allergies indicates:   Allergen Reactions    Cefdinir Anaphylaxis    Penicillins Anaphylaxis    Hydrocodone-acetaminophen Itching    Morphine Hives    Opioids - morphine analogues Hives     Past Medical History:   Diagnosis Date    Degeneration, intervertebral disc, cervicothoracic     Herniated nucleus pulposus with myelopathy, cervical     High cholesterol     Hypertension     Kidney stones     Muscle spasms of neck     PONV (postoperative nausea and vomiting)     Spinal stenosis of lumbar region with neurogenic claudication     Stenosis of cervical spine with myelopathy      Past Surgical History:   Procedure Laterality Date    APPENDECTOMY      Bilateral L4-5, L5-S1 decompression; right L5-S1 microdiscectomy  03/08/2022    Dr. Rodriguez    C3-5 laminectomies, C2-C6 posterior instrumented fusion  03/08/2022    Dr. Rodriguez    CHOLECYSTECTOMY      colonocscopy      CYSTOSCOPY W/ URETERAL STENT PLACEMENT Left 12/21/2022    Procedure: CYSTOSCOPY, WITH URETERAL STENT INSERTION;  Surgeon: Ramirez Wei MD;  Location: [facility];  Service: Urology;  Laterality: Left;  LEFT URETERAL STENT PLACEMENT    CYSTOURETEROSCOPY, WITH HOLMIUM LASER LITHOTRIPSY OF URETERAL CALCULUS AND STENT INSERTION Left 2/3/2023    Procedure: CYSTOURETEROSCOPY, WITH HOLMIUM LASER LITHOTRIPSY OF URETERAL CALCULUS AND STENT " INSERTION / Stent exchange Left;  Surgeon: Ramirez Wei MD;  Location: [facility] OR;  Service: Urology;  Laterality: Left;    LEFT HEART CATHETERIZATION  10/16/2017    PARATHYROIDECTOMY N/A 05/06/2022    Procedure: PARATHYROIDECTOMY;  Surgeon: Ajay Blum MD;  Location: [facility] OR;  Service: ENT;  Laterality: N/A;  PARATHYROIDECTOMY // NERVE MONITOR // FROZEN SECTION // INTRA OP PTH // SUPINE     Family History   Problem Relation Age of Onset    Cancer Mother         meningiomas    Liver cancer Mother     Cancer Father         colon    Liver disease Sister      Social History     Tobacco Use    Smoking status: Former    Smokeless tobacco: Never   Substance Use Topics    Alcohol use: Yes     Alcohol/week: 2.0 standard drinks     Types: 2 Standard drinks or equivalent per week     Comment: SOCIALLY    Drug use: No     Review of Systems   Eyes:  Positive for visual disturbance.   Cardiovascular:  Negative for chest pain and leg swelling.   Gastrointestinal:  Negative for nausea.   Neurological:  Positive for light-headedness and headaches. Negative for seizures, syncope and weakness.     Physical Exam     Initial Vitals [03/20/23 1308]   BP Pulse Resp Temp SpO2   (!) 155/87 63 18 98 °F (36.7 °C) 99 %      MAP       --         Physical Exam    Nursing note and vitals reviewed.  Constitutional: He appears well-developed and well-nourished. He is not diaphoretic. He does not appear ill. No distress.   HENT:   Head: Normocephalic and atraumatic.   Right Ear: External ear normal.   Left Ear: External ear normal.   Nose: Nose normal.   Mouth/Throat: Oropharynx is clear and moist.   Eyes: Conjunctivae and EOM are normal. Pupils are equal, round, and reactive to light.   Neck: Neck supple. No tracheal deviation present.   Cardiovascular:  Normal rate, regular rhythm and normal heart sounds.           Pulmonary/Chest: Breath sounds normal. No respiratory distress. He has no wheezes. He has no rhonchi. He has no rales.    Abdominal: Abdomen is soft. He exhibits no distension.   No right CVA tenderness.  No left CVA tenderness.   Musculoskeletal:         General: Normal range of motion.      Cervical back: Neck supple.     Neurological: He is alert and oriented to person, place, and time. He has normal strength. No cranial nerve deficit. GCS score is 15. GCS eye subscore is 4. GCS verbal subscore is 5. GCS motor subscore is 6.   Skin: Skin is warm and dry. Capillary refill takes less than 2 seconds. No pallor.   Psychiatric: He has a normal mood and affect. His mood appears not anxious.       ED Course   Procedures  Labs Reviewed   URINALYSIS, REFLEX TO URINE CULTURE - Abnormal; Notable for the following components:       Result Value    Blood, UA Trace (*)     All other components within normal limits   PROTIME-INR - Normal   TROPONIN I - Normal   URINALYSIS, MICROSCOPIC - Normal   COMPREHENSIVE METABOLIC PANEL   CBC W/ AUTO DIFFERENTIAL    Narrative:     The following orders were created for panel order CBC Auto Differential.  Procedure                               Abnormality         Status                     ---------                               -----------         ------                     CBC with Differential[[id number]]                            Final result                 Please view results for these tests on the individual orders.   CBC WITH DIFFERENTIAL     EKG Readings: (Independently Interpreted)   Rhythm: Normal Sinus Rhythm. Heart Rate: 69. Ectopy: No Ectopy. Conduction: Normal. ST Segments: Normal ST Segments. T Waves: Normal. Axis: Normal.   Performed at 1:13 p.m. on 03/20/2023   ECG Results              EKG 12-lead (Final result)  Result time 03/20/23 20:19:31      Final result by Interface, Lab In [facility] (03/20/23 20:19:31)                   Narrative:    Test Reason : R42,    Vent. Rate : 069 BPM     Atrial Rate : 069 BPM     P-R Int : 142 ms          QRS Dur : 086 ms      QT Int : 372 ms       P-R-T Axes  : 072 082 039 degrees     QTc Int : 398 ms    Normal sinus rhythm  Normal ECG    Confirmed by Mor Addison MD ([id number]) on 3/20/2023 8:19:25 PM    Referred By:             Confirmed By:Mor Addison MD                                  Imaging Results              CT Head Without Contrast (Final result)  Result time 03/20/23 14:24:51      Final result by Cisco Sears MD (03/20/23 14:24:51)                   Impression:      No acute intracranial findings.      Electronically signed by: Cisco Sears MD  Date:    03/20/2023  Time:    14:24               Narrative:    EXAMINATION:  CT HEAD WITHOUT CONTRAST    CLINICAL HISTORY:  Altered mental status    TECHNIQUE:  Axial CT images were obtained through the head without contrast.  Coronal and sagittal reformations were also performed.  Total DLP 1138.  Automated exposure control utilized.    COMPARISON:  07/19/2017    FINDINGS:  No hemorrhage, mass effect or CT evidence of acute cortical infarction.  Brain parenchyma has a normal attenuation.  Ventricles and sulci are proportionate.    No abnormal extra-axial fluid collections.    No hyperdense artery or vein.    No skull fracture or aggressive osseous process.  Visualized paranasal sinuses and mastoid air cells are clear.                                       Medications   aspirin EC tablet 325 mg (has no administration in time range)   meclizine tablet 25 mg (25 mg Oral Given 3/21/23 0214)   losartan split tablet 12.5 mg (12.5 mg Oral Given 3/21/23 0405)                       Medical Decision Making  Initial evaluation: well appearing, episode of lightheadedness followed by continued blurred vision and disorientation, no focal deficits    DDX: CVA, ACS, hyper/hypoglycemia, hypertensive urgency vs emergency, vertigo, brain mass    Labs and CT head normal  Planned to get MRI to rule out posterior circulation CVA however patient requires anesthesia for MRI   Spoke with hospitalist for admission  Given aspirin and home  BP meds     Problems Addressed:  Near syncope: acute illness or injury that poses a threat to life or bodily functions    Amount and/or Complexity of Data Reviewed  Labs: ordered. Decision-making details documented in ED Course.  Radiology: ordered. Decision-making details documented in ED Course.  ECG/medicine tests: ordered and independent interpretation performed. Decision-making details documented in ED Course.    Risk  Prescription drug management.             Clinical Impression:   Final diagnoses:  [R42] Dizziness  [R55] Near syncope (Primary)        ED Disposition Condition    Observation Stable                Aster Sewell MD  03/21/23 8173

## 2023-03-21 NOTE — HPI
Ozzy Alfaro is a 52 year-old male with PMH of HTN presenting lightheadedness/dizziness,HA, and blurred vision that started Danial 3/19 while visiting with his children. Daughter , reportedly checked his blood pressure and blood sugar and both were okay  Per notes, patient stated that he felt better when lying down, but still positive for blurred vision and HA. He experienced the same symptoms this morning prior to going to work and decided to come to the ER. Patient was afebrile, NSR on EKG, without any significant labs, and hemodynamically stable upon admit. Neurology consulted for further evaluation for concern posterior CVA.

## 2023-03-22 ENCOUNTER — ANESTHESIA EVENT (OUTPATIENT)
Dept: SURGERY | Facility: HOSPITAL | Age: 53
End: 2023-03-22
Payer: COMMERCIAL

## 2023-03-22 ENCOUNTER — ANESTHESIA (OUTPATIENT)
Dept: SURGERY | Facility: HOSPITAL | Age: 53
End: 2023-03-22
Payer: COMMERCIAL

## 2023-03-22 LAB
ALBUMIN SERPL-MCNC: 3.6 G/DL (ref 3.5–5)
ALBUMIN/GLOB SERPL: 1.4 RATIO (ref 1.1–2)
ALP SERPL-CCNC: 80 UNIT/L (ref 40–150)
ALT SERPL-CCNC: 15 UNIT/L (ref 0–55)
APTT PPP: 31.4 SECONDS (ref 23.2–33.7)
AST SERPL-CCNC: 14 UNIT/L (ref 5–34)
AV INDEX (PROSTH): 0.76
AV MEAN GRADIENT: 4 MMHG
AV PEAK GRADIENT: 8 MMHG
AV VALVE AREA: 2.4 CM2
AV VELOCITY RATIO: 0.76
BASOPHILS # BLD AUTO: 0.07 X10(3)/MCL (ref 0–0.2)
BASOPHILS NFR BLD AUTO: 1.2 %
BILIRUBIN DIRECT+TOT PNL SERPL-MCNC: 0.7 MG/DL
BUN SERPL-MCNC: 13.1 MG/DL (ref 8.4–25.7)
CALCIUM SERPL-MCNC: 8.9 MG/DL (ref 8.4–10.2)
CHLORIDE SERPL-SCNC: 108 MMOL/L (ref 98–107)
CK MB SERPL-MCNC: 0.5 NG/ML
CO2 SERPL-SCNC: 25 MMOL/L (ref 22–29)
CREAT SERPL-MCNC: 1.15 MG/DL (ref 0.73–1.18)
CV ECHO LV RWT: 0.46 CM
DOP CALC AO PEAK VEL: 1.37 M/S
DOP CALC AO VTI: 30 CM
DOP CALC LVOT AREA: 3.1 CM2
DOP CALC LVOT DIAMETER: 2 CM
DOP CALC LVOT PEAK VEL: 1.04 M/S
DOP CALC LVOT STROKE VOLUME: 71.91 CM3
DOP CALC MV VTI: 36.1 CM
DOP CALCLVOT PEAK VEL VTI: 22.9 CM
E WAVE DECELERATION TIME: 232 MSEC
E/A RATIO: 1.05
E/E' RATIO: 13.13 M/S
ECHO LV POSTERIOR WALL: 1.09 CM (ref 0.6–1.1)
EJECTION FRACTION: 65 %
EOSINOPHIL # BLD AUTO: 0.24 X10(3)/MCL (ref 0–0.9)
EOSINOPHIL NFR BLD AUTO: 4 %
ERYTHROCYTE [DISTWIDTH] IN BLOOD BY AUTOMATED COUNT: 12.6 % (ref 11.5–17)
FRACTIONAL SHORTENING: 35 % (ref 28–44)
GFR SERPLBLD CREATININE-BSD FMLA CKD-EPI: >60 MLS/MIN/1.73/M2
GLOBULIN SER-MCNC: 2.6 GM/DL (ref 2.4–3.5)
GLUCOSE SERPL-MCNC: 110 MG/DL (ref 74–100)
HCT VFR BLD AUTO: 45.4 % (ref 42–52)
HGB BLD-MCNC: 15.5 G/DL (ref 14–18)
IMM GRANULOCYTES # BLD AUTO: 0.02 X10(3)/MCL (ref 0–0.04)
IMM GRANULOCYTES NFR BLD AUTO: 0.3 %
INR BLD: 1.09 (ref 0–1.3)
INTERVENTRICULAR SEPTUM: 1.02 CM (ref 0.6–1.1)
LEFT ATRIUM SIZE: 3.8 CM
LEFT ATRIUM VOLUME MOD: 47.4 CM3
LEFT INTERNAL DIMENSION IN SYSTOLE: 3.11 CM (ref 2.1–4)
LEFT VENTRICLE DIASTOLIC VOLUME: 106 ML
LEFT VENTRICLE SYSTOLIC VOLUME: 38.2 ML
LEFT VENTRICULAR INTERNAL DIMENSION IN DIASTOLE: 4.78 CM (ref 3.5–6)
LEFT VENTRICULAR MASS: 181.87 G
LV LATERAL E/E' RATIO: 11.67 M/S
LV SEPTAL E/E' RATIO: 15 M/S
LVOT MG: 2 MMHG
LVOT MV: 0.65 CM/S
LYMPHOCYTES # BLD AUTO: 2.26 X10(3)/MCL (ref 0.6–4.6)
LYMPHOCYTES NFR BLD AUTO: 37.6 %
MAGNESIUM SERPL-MCNC: 1.9 MG/DL (ref 1.6–2.6)
MCH RBC QN AUTO: 29.9 PG
MCHC RBC AUTO-ENTMCNC: 34.1 G/DL (ref 33–36)
MCV RBC AUTO: 87.6 FL (ref 80–94)
MONOCYTES # BLD AUTO: 0.48 X10(3)/MCL (ref 0.1–1.3)
MONOCYTES NFR BLD AUTO: 8 %
MV MEAN GRADIENT: 2 MMHG
MV PEAK A VEL: 1 M/S
MV PEAK E VEL: 1.05 M/S
MV PEAK GRADIENT: 5 MMHG
MV STENOSIS PRESSURE HALF TIME: 85 MS
MV VALVE AREA BY CONTINUITY EQUATION: 1.99 CM2
MV VALVE AREA P 1/2 METHOD: 2.59 CM2
NEUTROPHILS # BLD AUTO: 2.94 X10(3)/MCL (ref 2.1–9.2)
NEUTROPHILS NFR BLD AUTO: 48.9 %
NRBC BLD AUTO-RTO: 0 %
PHOSPHATE SERPL-MCNC: 3.8 MG/DL (ref 2.3–4.7)
PLATELET # BLD AUTO: 204 X10(3)/MCL (ref 130–400)
PMV BLD AUTO: 10.1 FL (ref 7.4–10.4)
POTASSIUM SERPL-SCNC: 4.3 MMOL/L (ref 3.5–5.1)
PROT SERPL-MCNC: 6.2 GM/DL (ref 6.4–8.3)
PROTHROMBIN TIME: 14 SECONDS (ref 12.5–14.5)
RA PRESSURE: 3 MMHG
RBC # BLD AUTO: 5.18 X10(6)/MCL (ref 4.7–6.1)
SODIUM SERPL-SCNC: 140 MMOL/L (ref 136–145)
TDI LATERAL: 0.09 M/S
TDI SEPTAL: 0.07 M/S
TDI: 0.08 M/S
TRICUSPID ANNULAR PLANE SYSTOLIC EXCURSION: 2.24 CM
TROPONIN I SERPL-MCNC: <0.01 NG/ML (ref 0–0.04)
WBC # SPEC AUTO: 6 X10(3)/MCL (ref 4.5–11.5)

## 2023-03-22 PROCEDURE — G0378 HOSPITAL OBSERVATION PER HR: HCPCS

## 2023-03-22 PROCEDURE — 63600175 PHARM REV CODE 636 W HCPCS: Performed by: INTERNAL MEDICINE

## 2023-03-22 PROCEDURE — 37000008 HC ANESTHESIA 1ST 15 MINUTES: Performed by: INTERNAL MEDICINE

## 2023-03-22 PROCEDURE — 83735 ASSAY OF MAGNESIUM: CPT | Performed by: INTERNAL MEDICINE

## 2023-03-22 PROCEDURE — 96374 THER/PROPH/DIAG INJ IV PUSH: CPT

## 2023-03-22 PROCEDURE — 25000003 PHARM REV CODE 250: Performed by: ANESTHESIOLOGY

## 2023-03-22 PROCEDURE — 84484 ASSAY OF TROPONIN QUANT: CPT | Performed by: INTERNAL MEDICINE

## 2023-03-22 PROCEDURE — 25000003 PHARM REV CODE 250: Performed by: INTERNAL MEDICINE

## 2023-03-22 PROCEDURE — 84100 ASSAY OF PHOSPHORUS: CPT | Performed by: INTERNAL MEDICINE

## 2023-03-22 PROCEDURE — 85025 COMPLETE CBC W/AUTO DIFF WBC: CPT | Performed by: INTERNAL MEDICINE

## 2023-03-22 PROCEDURE — 63600175 PHARM REV CODE 636 W HCPCS: Performed by: NURSE ANESTHETIST, CERTIFIED REGISTERED

## 2023-03-22 PROCEDURE — 80053 COMPREHEN METABOLIC PANEL: CPT | Performed by: INTERNAL MEDICINE

## 2023-03-22 PROCEDURE — 85610 PROTHROMBIN TIME: CPT | Performed by: INTERNAL MEDICINE

## 2023-03-22 PROCEDURE — 37000009 HC ANESTHESIA EA ADD 15 MINS: Performed by: INTERNAL MEDICINE

## 2023-03-22 PROCEDURE — 82553 CREATINE MB FRACTION: CPT | Performed by: INTERNAL MEDICINE

## 2023-03-22 PROCEDURE — 96372 THER/PROPH/DIAG INJ SC/IM: CPT | Mod: 59 | Performed by: INTERNAL MEDICINE

## 2023-03-22 PROCEDURE — 25000003 PHARM REV CODE 250: Performed by: NURSE ANESTHETIST, CERTIFIED REGISTERED

## 2023-03-22 PROCEDURE — 85730 THROMBOPLASTIN TIME PARTIAL: CPT | Performed by: INTERNAL MEDICINE

## 2023-03-22 PROCEDURE — 96361 HYDRATE IV INFUSION ADD-ON: CPT

## 2023-03-22 RX ORDER — SCOLOPAMINE TRANSDERMAL SYSTEM 1 MG/1
1 PATCH, EXTENDED RELEASE TRANSDERMAL ONCE
Status: COMPLETED | OUTPATIENT
Start: 2023-03-22 | End: 2023-03-22

## 2023-03-22 RX ORDER — ONDANSETRON 4 MG/1
4 TABLET, ORALLY DISINTEGRATING ORAL ONCE
Status: COMPLETED | OUTPATIENT
Start: 2023-03-22 | End: 2023-03-22

## 2023-03-22 RX ORDER — FAMOTIDINE 20 MG/1
40 TABLET, FILM COATED ORAL ONCE
Status: COMPLETED | OUTPATIENT
Start: 2023-03-22 | End: 2023-03-22

## 2023-03-22 RX ORDER — MIDAZOLAM HYDROCHLORIDE 1 MG/ML
INJECTION INTRAMUSCULAR; INTRAVENOUS
Status: DISCONTINUED | OUTPATIENT
Start: 2023-03-22 | End: 2023-03-22

## 2023-03-22 RX ORDER — LOSARTAN POTASSIUM 25 MG/1
25 TABLET ORAL DAILY
Status: DISCONTINUED | OUTPATIENT
Start: 2023-03-23 | End: 2023-03-22

## 2023-03-22 RX ORDER — HYDRALAZINE HYDROCHLORIDE 20 MG/ML
10 INJECTION INTRAMUSCULAR; INTRAVENOUS EVERY 6 HOURS PRN
Status: DISCONTINUED | OUTPATIENT
Start: 2023-03-22 | End: 2023-03-23 | Stop reason: HOSPADM

## 2023-03-22 RX ORDER — LOSARTAN POTASSIUM 25 MG/1
25 TABLET ORAL NIGHTLY
Status: DISCONTINUED | OUTPATIENT
Start: 2023-03-22 | End: 2023-03-23 | Stop reason: HOSPADM

## 2023-03-22 RX ORDER — ONDANSETRON 2 MG/ML
4 INJECTION INTRAMUSCULAR; INTRAVENOUS ONCE AS NEEDED
Status: DISCONTINUED | OUTPATIENT
Start: 2023-03-22 | End: 2023-03-23 | Stop reason: HOSPADM

## 2023-03-22 RX ORDER — MECLIZINE HYDROCHLORIDE 25 MG/1
25 TABLET ORAL 3 TIMES DAILY PRN
Status: DISCONTINUED | OUTPATIENT
Start: 2023-03-22 | End: 2023-03-23 | Stop reason: HOSPADM

## 2023-03-22 RX ORDER — DEXAMETHASONE SODIUM PHOSPHATE 4 MG/ML
INJECTION, SOLUTION INTRA-ARTICULAR; INTRALESIONAL; INTRAMUSCULAR; INTRAVENOUS; SOFT TISSUE
Status: DISCONTINUED | OUTPATIENT
Start: 2023-03-22 | End: 2023-03-22

## 2023-03-22 RX ORDER — CYCLOBENZAPRINE HCL 10 MG
10 TABLET ORAL 3 TIMES DAILY PRN
Status: DISCONTINUED | OUTPATIENT
Start: 2023-03-22 | End: 2023-03-23 | Stop reason: HOSPADM

## 2023-03-22 RX ORDER — PROPOFOL 10 MG/ML
INJECTION, EMULSION INTRAVENOUS
Status: DISCONTINUED | OUTPATIENT
Start: 2023-03-22 | End: 2023-03-22

## 2023-03-22 RX ORDER — LIDOCAINE HCL/PF 100 MG/5ML
SYRINGE (ML) INTRAVENOUS
Status: DISCONTINUED | OUTPATIENT
Start: 2023-03-22 | End: 2023-03-22

## 2023-03-22 RX ORDER — OXYCODONE AND ACETAMINOPHEN 10; 325 MG/1; MG/1
1 TABLET ORAL EVERY 6 HOURS PRN
Status: DISCONTINUED | OUTPATIENT
Start: 2023-03-22 | End: 2023-03-23 | Stop reason: HOSPADM

## 2023-03-22 RX ORDER — KETOROLAC TROMETHAMINE 30 MG/ML
15 INJECTION, SOLUTION INTRAMUSCULAR; INTRAVENOUS ONCE
Status: COMPLETED | OUTPATIENT
Start: 2023-03-22 | End: 2023-03-22

## 2023-03-22 RX ORDER — GLYCOPYRROLATE 0.2 MG/ML
INJECTION INTRAMUSCULAR; INTRAVENOUS
Status: DISCONTINUED | OUTPATIENT
Start: 2023-03-22 | End: 2023-03-22

## 2023-03-22 RX ORDER — SODIUM CHLORIDE, SODIUM LACTATE, POTASSIUM CHLORIDE, CALCIUM CHLORIDE 600; 310; 30; 20 MG/100ML; MG/100ML; MG/100ML; MG/100ML
INJECTION, SOLUTION INTRAVENOUS CONTINUOUS
Status: DISCONTINUED | OUTPATIENT
Start: 2023-03-22 | End: 2023-03-22

## 2023-03-22 RX ADMIN — LOSARTAN POTASSIUM 25 MG: 25 TABLET, FILM COATED ORAL at 08:03

## 2023-03-22 RX ADMIN — ONDANSETRON 4 MG: 4 TABLET, ORALLY DISINTEGRATING ORAL at 12:03

## 2023-03-22 RX ADMIN — MIDAZOLAM HYDROCHLORIDE 2 MG: 1 INJECTION, SOLUTION INTRAMUSCULAR; INTRAVENOUS at 01:03

## 2023-03-22 RX ADMIN — GABAPENTIN 300 MG: 300 CAPSULE ORAL at 08:03

## 2023-03-22 RX ADMIN — SCOPOLAMINE 1 PATCH: 1 PATCH TRANSDERMAL at 03:03

## 2023-03-22 RX ADMIN — PROPOFOL 200 MG: 10 INJECTION, EMULSION INTRAVENOUS at 01:03

## 2023-03-22 RX ADMIN — LIDOCAINE HYDROCHLORIDE 80 MG: 20 INJECTION, SOLUTION INTRAVENOUS at 01:03

## 2023-03-22 RX ADMIN — FAMOTIDINE 40 MG: 20 TABLET, FILM COATED ORAL at 12:03

## 2023-03-22 RX ADMIN — SODIUM CHLORIDE: 9 INJECTION, SOLUTION INTRAVENOUS at 04:03

## 2023-03-22 RX ADMIN — KETOROLAC TROMETHAMINE 15 MG: 30 INJECTION, SOLUTION INTRAMUSCULAR at 10:03

## 2023-03-22 RX ADMIN — PROPOFOL 20 MG: 10 INJECTION, EMULSION INTRAVENOUS at 02:03

## 2023-03-22 RX ADMIN — ASPIRIN 325 MG: 325 TABLET, COATED ORAL at 08:03

## 2023-03-22 RX ADMIN — ENOXAPARIN SODIUM 40 MG: 40 INJECTION SUBCUTANEOUS at 04:03

## 2023-03-22 RX ADMIN — DEXAMETHASONE SODIUM PHOSPHATE 4 MG: 4 INJECTION, SOLUTION INTRA-ARTICULAR; INTRALESIONAL; INTRAMUSCULAR; INTRAVENOUS; SOFT TISSUE at 01:03

## 2023-03-22 RX ADMIN — SODIUM CHLORIDE, SODIUM GLUCONATE, SODIUM ACETATE, POTASSIUM CHLORIDE AND MAGNESIUM CHLORIDE: 526; 502; 368; 37; 30 INJECTION, SOLUTION INTRAVENOUS at 01:03

## 2023-03-22 RX ADMIN — GLYCOPYRROLATE 0.2 MG: 0.2 INJECTION INTRAMUSCULAR; INTRAVENOUS at 01:03

## 2023-03-22 NOTE — ANESTHESIA PROCEDURE NOTES
Intubation    Date/Time: 3/22/2023 1:47 PM  Performed by: Crystal Cruz CRNA  Authorized by: Janessa Farr MD     Intubation:     Induction:  Intravenous    Intubated:  Postinduction    Mask Ventilation:  Easy mask    Attempts:  1    Attempted By:  CRNA    Method of Intubation:  Blind intubation    Difficult Airway Encountered?: No      Complications:  None    Airway Device:  Supraglottic airway/LMA    Airway Device Size:  4.0    Style/Cuff Inflation:  Cuffed (inflated to minimal occlusive pressure)    Secured at:  The teeth    Placement Verified By:  Capnometry    Complicating Factors:  None    Findings Post-Intubation:  BS equal bilateral

## 2023-03-22 NOTE — ANESTHESIA POSTPROCEDURE EVALUATION
Anesthesia Post Evaluation    Patient: Eb Alfaro    Procedure(s) Performed: Procedure(s) (LRB):  MRI (Magnetic Resonance Imagine) (N/A)    Final Anesthesia Type: general      Patient location during evaluation: PACU  Patient participation: Yes- Able to Participate  Level of consciousness: awake and alert, awake and oriented  Post-procedure vital signs: reviewed and stable  Pain management: adequate  Airway patency: patent    PONV status at discharge: No PONV (Zofran ODT and Famotidine 40mg tab PO preop, Zofran IV post induction. TScope Patch.)  Anesthetic complications: no      Cardiovascular status: blood pressure returned to baseline, hemodynamically stable and stable  Respiratory status: unassisted, spontaneous ventilation and room air  Hydration status: euvolemic  Follow-up not needed.          Vitals Value Taken Time   /80 03/22/23 1521   Temp 36.2 °C (97.1 °F) 03/22/23 1510   Pulse 72 03/22/23 1525   Resp 20 03/22/23 1525   SpO2 98 % 03/22/23 1525   Vitals shown include unvalidated device data.      No case tracking events are documented in the log.      Pain/Tommy Score: Pain Rating Prior to Med Admin: 8 (3/22/2023 10:48 AM)  Pain Rating Post Med Admin: 4 (3/22/2023 11:18 AM)  Tommy Score: 10 (3/22/2023  3:10 PM)

## 2023-03-22 NOTE — PROGRESS NOTES
[facility] Hospital Medicine Progress Note        Chief Complaint: Inpatient Follow-up for dizziness    HPI:   Patient is a pleasant 52-year-old male with history of hypertension and chronic back pain who presented to the ER for dizziness that started Sunday.  Patient reports he had relatively abrupt onset of decreased visual acuity and significant dizziness that made him have to take rest for some time on Sunday.  On Monday attempted to go to work but quickly throughout the course of the morning started developed severe dizziness again therefore presented to the ER.  Was afebrile hemodynamically stable on arrival.  CT of his head showed no acute process and laboratory work was unremarkable.  Out of concern for posterior circulation CVA hospitalist service was consulted for admission.      Patient reports he can only undergo MRIs under anesthesia and is followed by Dr. Rodriguez for chronic back pain and has an upcoming MRI lumbar spine scheduled early April and asked if this can be done at the same time.  Of note his symptoms did not respond to meclizine in the ER.     Neurology was consulted and MRI was ordered.    Interval Hx:   No acute events overnight.  Doing well on room air.  Comfortably resting in the bed.  Complains of headache.  No family at bedside.  Scheduled for MRI under anesthesia today.      Objective/physical exam:  Vitals:    03/21/23 1753 03/21/23 1911 03/21/23 2312 03/22/23 0323   BP: 132/73 124/68 (!) 101/54 (!) 93/55   Pulse: 73 69 67 69   Resp: 18 18 18 19   Temp: 97.7 °F (36.5 °C) 98.1 °F (36.7 °C) 97.8 °F (36.6 °C) 98 °F (36.7 °C)   TempSrc: Oral Oral Oral Oral   SpO2: 96% 98% 96% 95%   Weight:       Height:  6' (1.829 m)       General: In no acute distress, afebrile  Respiratory: Clear to auscultation bilaterally  Cardiovascular: S1, S2, no appreciable murmur  Abdomen: Soft, nontender, BS +  MSK: Warm, no lower extremity edema, no clubbing or  cyanosis  Neurologic: Alert and oriented x4, moving all extremities with good strength     Lab Results   Component Value Date     03/22/2023    K 4.3 03/22/2023    CO2 25 03/22/2023    BUN 13.1 03/22/2023    CREATININE 1.15 03/22/2023    CALCIUM 8.9 03/22/2023    EGFRNONAA >60 05/03/2022      Lab Results   Component Value Date    ALT 15 03/22/2023    AST 14 03/22/2023    ALKPHOS 80 03/22/2023    BILITOT 0.7 03/22/2023      Lab Results   Component Value Date    WBC 6.0 03/22/2023    HGB 15.5 03/22/2023    HCT 45.4 03/22/2023    MCV 87.6 03/22/2023     03/22/2023           Medications:   aspirin  325 mg Oral Daily    enoxaparin  40 mg Subcutaneous Daily    gabapentin  300 mg Oral BID      labetalol, sodium chloride 0.9%     Assessment/Plan:    Acute onset dizziness and blurry vision, rule out posterior CVA     HX:  HTN, peripheral neuropathy, degenerative joint disease of the spine    Plan:  -follow up MRI brain lumbar spine under anesthesia   -continue current analgesics   -neurology on board.  Complete stroke workup.  TTE read pending.  Carotid Doppler showed no significant stenosis.  -will review home medications.    -Toradol for pain control.           Champ Bianchi MD

## 2023-03-22 NOTE — PT/OT/SLP PROGRESS
Physical Therapy      Patient Name:  Eb Alfaro   MRN:  [id number]    Patient off floor for MRI with anesthesia. Will f/u tomorrow.

## 2023-03-22 NOTE — PROGRESS NOTES
[facility]  Hospital Medicine Progress Note      Mr Alfaro is a 52-year-old gentleman with PMH of HTN, prior neck surgery, prior back surgery (2022) who was admitted with complaints of lightheadedness/dizziness and blurred vision for the last 2 days.  He reported resting in a chair when all of a sudden he started feeling lightheaded and had associated blurred vision without any hearing deficits, double vision, headache, weakness/numbness in his arms or legs, dysphagia, aphasia.  He reported going to bed that day and waking up the next morning with the same symptoms.  He reported worsening of his lightheadedness when he went to work after which he was brought to the hospital.  He denied having any falls.  CT head showed no acute abnormalities.  Neurology was consulted from the ER and hospital Medicine was consulted for admission.  MRI brain was ordered however patient is severely claustrophobic and needs general anesthesia to undergo MRI.  He is scheduled for to have MRI lumbar spine outpatient which was also ordered to be done along with MRI brain.  Anesthesia was consulted, will follow recommendations. Labs showed unremarkable blood counts, unremarkable electrolytes, BUN/creatinine 10.4/1.18, negative troponins, unremarkable UA.  Echocardiogram was ordered as was U/S B/L carotids.    O/E:  Alert, awake gentleman lying comfortably in bed; S1 plus S2 with no additional heart sounds; clear breathing sounds bilaterally with no rales, rhonchi or wheezes; soft, nontender, nondistended abdomen with no guarding/rigidity; no pedal edema; AAO x3, motor strength 5/5 in bilateral upper and lower extremities, sensation intact to gross and fine touch, CN 2-12 grossly intact bilaterally    Will F/U on MRI Brain & Lumbar Spine. Neurology consulted, will follow recs.  Will continue patient on aspirin 325 mg daily, gabapentin 300 mg b.i.d., IV NS 75 mL/hour. Will add home Rosuvastatin. Patient to be NPO  after midnight for MRI tomorrow with anesthesia.      Anton Bishop MD

## 2023-03-22 NOTE — ANESTHESIA PREPROCEDURE EVALUATION
03/22/2023  Eb Alfaro is a 52 y.o., male.    Pre-operative evaluation for Procedure(s) (LRB):  MRI (Magnetic Resonance Imagine) (N/A)      Hospitalist Notes 3/23/2023:   Eb Alfaro is a 52 y.o. male  history of hypertension and chronic back pain who presented to the ER for dizziness that started Sunday.  Patient reports he had relatively abrupt onset of decreased visual acuity and significant dizziness that made him have to take rest for some time on Sunday.  On Monday attempted to go to work but quickly throughout the course of the morning started developed severe dizziness again therefore presented to the ER.  Was afebrile hemodynamically stable on arrival.  CT of his head showed no acute process and laboratory work was unremarkable.  Out of concern for posterior circulation CVA hospitalist service was consulted for admission.       Patient Active Problem List   Diagnosis    Hyperparathyroidism, primary    Primary hypertension    Cervical vertebral fusion    Stenosis of cervical spine with myelopathy    Lumbar stenosis with neurogenic claudication    Displacement of lumbar disc with radiculopathy    Ureterolithiasis    Near syncope    TIA (transient ischemic attack)       Review of patient's allergies indicates:   Allergen Reactions    Cefdinir Anaphylaxis    Penicillins Anaphylaxis    Hydrocodone-acetaminophen Itching    Morphine Hives    Opioids - morphine analogues Hives       No current facility-administered medications on file prior to encounter.     Current Outpatient Medications on File Prior to Encounter   Medication Sig Dispense Refill    gabapentin (NEURONTIN) 300 MG capsule Take 1 capsule (300 mg total) by mouth 3 (three) times daily. (Patient taking differently: Take 300 mg by mouth 2 (two) times daily.) 90 capsule 5    olmesartan (BENICAR) 5 MG Tab TAKE 1 TABLET(5  MG) BY MOUTH TWICE DAILY 180 tablet 3    orphenadrine (NORFLEX) 100 mg tablet Take 1 tablet (100 mg total) by mouth 2 (two) times daily. 60 tablet 2    rosuvastatin (CRESTOR) 10 MG tablet Take 1 tablet (10 mg total) by mouth every evening. 90 tablet 3    cholecalciferol, vitamin D3, (VITAMIN D3) 50 mcg (2,000 unit) Cap capsule Take by mouth once daily.      cyclobenzaprine (FLEXERIL) 10 MG tablet TAKE 1 TABLET BY MOUTH THREE TIMES DAILY AS NEEDED FOR SPASM      hyoscyamine (LEVSIN/SL) 0.125 mg Subl Place 1 tablet (0.125 mg total) under the tongue every 4 (four) hours as needed. (Patient not taking: Reported on 3/8/2023) 30 tablet 0    ibuprofen (ADVIL,MOTRIN) 200 MG tablet Take 200 mg by mouth every 6 (six) hours as needed for Pain.      ondansetron (ZOFRAN-ODT) 4 MG TbDL Take 1 tablet (4 mg total) by mouth every 6 (six) hours as needed (nausea/vomiting). (Patient not taking: Reported on 3/8/2023) 30 tablet 0    oxyCODONE-acetaminophen (PERCOCET)  mg per tablet Take by mouth.      tamsulosin (FLOMAX) 0.4 mg Cap Take 1 capsule (0.4 mg total) by mouth once daily. for 14 days 14 capsule 0    traMADoL (ULTRAM) 50 mg tablet Take 1 tablet (50 mg total) by mouth every 8 (eight) hours as needed for Pain. (Patient not taking: Reported on 3/8/2023) 30 tablet 0       CBC:   Recent Labs     03/21/23  1246 03/22/23  0514   WBC 6.8 6.0   RBC 5.22 5.18   HGB 15.6 15.5   HCT 45.5 45.4    204   MCV 87.2 87.6   MCH 29.9 29.9   MCHC 34.3 34.1       CMP: eGFR: > 60  Recent Labs     03/20/23  1330 03/21/23  0518 03/22/23  0513    137 140   K 4.2 4.3 4.3   CO2 28 26 25   BUN 10.4 9.6 13.1   CREATININE 1.18 1.21* 1.15   MG  --   --  1.90   PHOS  --   --  3.8   CALCIUM 10.1 9.2 8.9   ALBUMIN 4.5  --  3.6   ALKPHOS 92  --  80   ALT 20  --  15   AST 17  --  14   BILITOT 0.6  --  0.7       INR  Recent Labs     03/20/23  1330 03/22/23  0514   INR 1.07 1.09   PROTIME 13.8 14.0     Diagnostic Studies:  CXR :  CT  Chest 12/2022:   No acute pathology identified at the chest and no significant pulmonary nodule or mass.  Chronic incidental findings as detailed above without significant change compared to chest CT 07/20/2017.    EKG : NSR=69    2D Echo 3/21/2023: EF=65%. Trace MR. Negative for shunt.       Last 3 sets of Vitals    Vitals - 1 value per visit 3/22/2023 3/22/2023 3/22/2023   SYSTOLIC 93 - 144   DIASTOLIC 55 - 83   Pulse 69 59 70   Temp 98 - 97.5   Resp 19 - 18   SPO2 95 - 98   Weight (lb) - - -   Weight (kg) - - -   Height - - -   BMI (Calculated) - - -   VISIT REPORT - - -   Pain Score  - - -         Lab Results   Component Value Date    LIPASE 47 12/19/2022        Lab Results   Component Value Date    HGBA1C 5.5 12/09/2022       Recent Labs   Lab 03/22/23  0513   TROPONINI <0.010        Pre-op Assessment    I have reviewed the Patient Summary Reports.     I have reviewed the Nursing Notes. I have reviewed the NPO Status.   I have reviewed the Medications.     Review of Systems  Anesthesia Hx:  PONV: Zofran ODT and Famotidine 4omg tab PO  preop. And IV post induction. TScope Patch.  Denies Glaucoma. History of prior surgery of interest to airway management or planning: cervical fusion. Previous anesthesia: General GB: ; Appy: ;12/2022 Cysto stent:; Litho:; 3/8 /2022 Cerv C2-6 fusion and Lumbar with general anesthesia.  Airway issues documented on chart review include GETA  Denies Family Hx of Anesthesia complications.  Personal Hx of Anesthesia complications, Post-Operative Nausea/Vomiting   Social:  Former Smoker, Alcohol Use    Hematology/Oncology:  Hematology Normal   Oncology Normal     EENT/Dental:EENT/Dental Normal   Cardiovascular:   Hypertension hyperlipidemia ECG has been reviewed. Carotids US 3/21/2023: B ICA <50%.  EKG : NSR=69    2D Echo 3/21/2023: EF=65%. Trace MR. Negative for shunt.   Pulmonary:  Pulmonary Normal    Renal/:   renal calculi    Hepatic/GI:  Hepatic/GI Normal    Musculoskeletal:    Arthritis  S/3/8/2022 Post.  Cerv C2-6 Fusion Spine Disorders: cervical and lumbar Disc disease    Neurological:   TIA, Neuromuscular Disease,    Endocrine:  Endocrine Normal    Dermatological:  Skin Normal    Psych:  Psychiatric Normal           Physical Exam  General: Well nourished, Cooperative, Alert and Oriented    Airway:  Mallampati: III / III  Mouth Opening: Normal  TM Distance: Normal  Tongue: Normal  Neck ROM: Extension Decreased  LROM but seems adeq. Mal 3. barely Mal2.  Dental:  Intact  Px denies any loose teeth.      Anesthesia Plan  Type of Anesthesia, risks & benefits discussed:    Anesthesia Type: Gen Supraglottic Airway  Intra-op Monitoring Plan: Standard ASA Monitors  Post Op Pain Control Plan: multimodal analgesia  Induction:  IV  Airway Plan: Direct  Informed Consent: Informed consent signed with the Patient and all parties understand the risks and agree with anesthesia plan.  All questions answered. Patient consented to blood products? No  ASA Score: 3  Day of Surgery Review of History & Physical: H&P Update referred to the surgeon/provider.I have interviewed and examined the patient. I have reviewed the patient's H&P dated:     Ready For Surgery From Anesthesia Perspective.     Past Surgical History:   Procedure Laterality Date    APPENDECTOMY      Bilateral L4-5, L5-S1 decompression; right L5-S1 microdiscectomy  03/08/2022    Dr. Rodriguez    C3-5 laminectomies, C2-C6 posterior instrumented fusion  03/08/2022    Dr. Rodriguez    CHOLECYSTECTOMY      colonocscopy      CYSTOSCOPY W/ URETERAL STENT PLACEMENT Left 12/21/2022    Procedure: CYSTOSCOPY, WITH URETERAL STENT INSERTION;  Surgeon: Ramirez Wei MD;  Location: [facility];  Service: Urology;  Laterality: Left;  LEFT URETERAL STENT PLACEMENT    CYSTOURETEROSCOPY, WITH HOLMIUM LASER LITHOTRIPSY OF URETERAL CALCULUS AND STENT INSERTION Left 2/3/2023    Procedure: CYSTOURETEROSCOPY, WITH HOLMIUM LASER LITHOTRIPSY OF URETERAL CALCULUS AND  STENT INSERTION / Stent exchange Left;  Surgeon: Ramirez Wei MD;  Location: [facility] OR;  Service: Urology;  Laterality: Left;    LEFT HEART CATHETERIZATION  10/16/2017    PARATHYROIDECTOMY N/A 05/06/2022    Procedure: PARATHYROIDECTOMY;  Surgeon: Ajay Blum MD;  Location: [facility] OR;  Service: ENT;  Laterality: N/A;  PARATHYROIDECTOMY // NERVE MONITOR // FROZEN SECTION // INTRA OP PTH // SUPINE           CBC:   Recent Labs     03/21/23  1246 03/22/23  0514   WBC 6.8 6.0   RBC 5.22 5.18   HGB 15.6 15.5   HCT 45.5 45.4    204   MCV 87.2 87.6   MCH 29.9 29.9   MCHC 34.3 34.1

## 2023-03-22 NOTE — PT/OT/SLP PROGRESS
Occupational Therapy      Patient Name:  Eb Alfaro   MRN:  [id number]    OT orders received. Pt off floor for MRI c anesthesia. Will follow up as appropriate.     3/22/2023

## 2023-03-23 ENCOUNTER — TELEPHONE (OUTPATIENT)
Dept: NEUROSURGERY | Facility: CLINIC | Age: 53
End: 2023-03-23
Payer: COMMERCIAL

## 2023-03-23 VITALS
TEMPERATURE: 98 F | DIASTOLIC BLOOD PRESSURE: 81 MMHG | OXYGEN SATURATION: 96 % | HEIGHT: 72 IN | HEART RATE: 66 BPM | WEIGHT: 230 LBS | RESPIRATION RATE: 16 BRPM | SYSTOLIC BLOOD PRESSURE: 140 MMHG | BODY MASS INDEX: 31.15 KG/M2

## 2023-03-23 LAB
ALBUMIN SERPL-MCNC: 3.6 G/DL (ref 3.5–5)
ALBUMIN/GLOB SERPL: 1.2 RATIO (ref 1.1–2)
ALP SERPL-CCNC: 83 UNIT/L (ref 40–150)
ALT SERPL-CCNC: 15 UNIT/L (ref 0–55)
AST SERPL-CCNC: 17 UNIT/L (ref 5–34)
BASOPHILS # BLD AUTO: 0.05 X10(3)/MCL (ref 0–0.2)
BASOPHILS NFR BLD AUTO: 0.5 %
BILIRUBIN DIRECT+TOT PNL SERPL-MCNC: 0.5 MG/DL
BUN SERPL-MCNC: 17 MG/DL (ref 8.4–25.7)
CALCIUM SERPL-MCNC: 9.3 MG/DL (ref 8.4–10.2)
CHLORIDE SERPL-SCNC: 104 MMOL/L (ref 98–107)
CO2 SERPL-SCNC: 24 MMOL/L (ref 22–29)
CREAT SERPL-MCNC: 1.18 MG/DL (ref 0.73–1.18)
EOSINOPHIL # BLD AUTO: 0.03 X10(3)/MCL (ref 0–0.9)
EOSINOPHIL NFR BLD AUTO: 0.3 %
ERYTHROCYTE [DISTWIDTH] IN BLOOD BY AUTOMATED COUNT: 12.2 % (ref 11.5–17)
GFR SERPLBLD CREATININE-BSD FMLA CKD-EPI: >60 MLS/MIN/1.73/M2
GLOBULIN SER-MCNC: 2.9 GM/DL (ref 2.4–3.5)
GLUCOSE SERPL-MCNC: 127 MG/DL (ref 74–100)
HCT VFR BLD AUTO: 43.2 % (ref 42–52)
HGB BLD-MCNC: 14.7 G/DL (ref 14–18)
IMM GRANULOCYTES # BLD AUTO: 0.03 X10(3)/MCL (ref 0–0.04)
IMM GRANULOCYTES NFR BLD AUTO: 0.3 %
LYMPHOCYTES # BLD AUTO: 1.56 X10(3)/MCL (ref 0.6–4.6)
LYMPHOCYTES NFR BLD AUTO: 15.5 %
MCH RBC QN AUTO: 29.5 PG
MCHC RBC AUTO-ENTMCNC: 34 G/DL (ref 33–36)
MCV RBC AUTO: 86.7 FL (ref 80–94)
MONOCYTES # BLD AUTO: 0.51 X10(3)/MCL (ref 0.1–1.3)
MONOCYTES NFR BLD AUTO: 5.1 %
NEUTROPHILS # BLD AUTO: 7.88 X10(3)/MCL (ref 2.1–9.2)
NEUTROPHILS NFR BLD AUTO: 78.3 %
NRBC BLD AUTO-RTO: 0 %
PLATELET # BLD AUTO: 226 X10(3)/MCL (ref 130–400)
PMV BLD AUTO: 10 FL (ref 7.4–10.4)
POTASSIUM SERPL-SCNC: 4.8 MMOL/L (ref 3.5–5.1)
PROT SERPL-MCNC: 6.5 GM/DL (ref 6.4–8.3)
RBC # BLD AUTO: 4.98 X10(6)/MCL (ref 4.7–6.1)
SODIUM SERPL-SCNC: 138 MMOL/L (ref 136–145)
WBC # SPEC AUTO: 10.1 X10(3)/MCL (ref 4.5–11.5)

## 2023-03-23 PROCEDURE — G0378 HOSPITAL OBSERVATION PER HR: HCPCS

## 2023-03-23 PROCEDURE — 99222 PR INITIAL HOSPITAL CARE,LEVL II: ICD-10-PCS | Mod: ,,, | Performed by: NEUROLOGICAL SURGERY

## 2023-03-23 PROCEDURE — 80053 COMPREHEN METABOLIC PANEL: CPT | Performed by: INTERNAL MEDICINE

## 2023-03-23 PROCEDURE — 25000003 PHARM REV CODE 250: Performed by: INTERNAL MEDICINE

## 2023-03-23 PROCEDURE — 99222 1ST HOSP IP/OBS MODERATE 55: CPT | Mod: ,,, | Performed by: NEUROLOGICAL SURGERY

## 2023-03-23 PROCEDURE — 85025 COMPLETE CBC W/AUTO DIFF WBC: CPT | Performed by: INTERNAL MEDICINE

## 2023-03-23 RX ORDER — GABAPENTIN 300 MG/1
300 CAPSULE ORAL 2 TIMES DAILY
Qty: 60 CAPSULE | Refills: 11 | Status: SHIPPED | OUTPATIENT
Start: 2023-03-23 | End: 2023-05-07 | Stop reason: SDUPTHER

## 2023-03-23 RX ADMIN — GABAPENTIN 300 MG: 300 CAPSULE ORAL at 09:03

## 2023-03-23 NOTE — TELEPHONE ENCOUNTER
I spoke to the patient.  He went to the ER and they wanted to do an MRI brain with sedation so he asked that they included the MRI lumbar that Dr. Rodriguez ordered.  He is aware that I do not have anything sooner right now to move up him and his daughters appointment, but they are on the wait list to be seen together.  BH

## 2023-03-23 NOTE — TELEPHONE ENCOUNTER
"Per Kerry sending me a secure chat today. "This jairo is established with Dr. Rodriguez. Was seen on 3/8 with plans for MRI. He is here for a different problem but hospitalist did obtain the MRI with anesthesia during this stay. He needs a f/u with y'all to review MRI." Isn't he supposed to come back with his daughter? I think Sarah already took care of those coordinating appts.  "

## 2023-03-23 NOTE — TELEPHONE ENCOUNTER
Not sure why this was sent to me, all of this information is in the chart.  He and his daughter are scheduled 5/1 to see Michael.  If there is something sooner for the two of them, move them up.  Otherwise, make sure the MRI scheduled in April with anesthesia is cancelled since he already had it.

## 2023-03-23 NOTE — DISCHARGE SUMMARY
[facility] Hospital Medicine Discharge Summary    Admit Date: 3/20/2023  Discharge Date and Time: 3/23/093665:39 PM  Admitting Physician:  Team  Discharging Physician: Melinda Moon MD.  Primary Care Physician: Nick Monzon MD  Consults: Hospital Medicine and Neurology    Discharge Diagnoses:  Acute onset dizziness and blurry vision, rule out posterior CVA      HX:  HTN, peripheral neuropathy, degenerative joint disease of the spine    Hospital Course:   Chief Complaint: Inpatient Follow-up for dizziness     HPI:   Patient is a pleasant 52-year-old male with history of hypertension and chronic back pain who presented to the ER for dizziness that started Sunday.  Patient reports he had relatively abrupt onset of decreased visual acuity and significant dizziness that made him have to take rest for some time on Sunday.  On Monday attempted to go to work but quickly throughout the course of the morning started developed severe dizziness again therefore presented to the ER.  Was afebrile hemodynamically stable on arrival.  CT of his head showed no acute process and laboratory work was unremarkable.  Out of concern for posterior circulation CVA hospitalist service was consulted for admission.      Patient reports he can only undergo MRIs under anesthesia and is followed by Dr. Rodriguez for chronic back pain and has an upcoming MRI lumbar spine scheduled early April and asked if this can be done at the same time.  Of note his symptoms did not respond to meclizine in the ER.     Neurology was consulted and MRI was ordered.  MRI of the brain was negative.  Neurology recommended to consult [facility] for outpatient monitoring for 30 days.    MRI of the lumbar spine showing spinal stenosis with left-sided nerve root impingement at L3-L4.  Patient follows up with Dr. Rodriguez Neurosurgery and will follow-up with him outpatient           Objective/physical exam:   General: In no acute distress,  afebrile  Respiratory: Clear to auscultation bilaterally  Cardiovascular: S1, S2, no appreciable murmur  Abdomen: Soft, nontender, BS +  MSK: Warm, no lower extremity edema, no clubbing or cyanosis  Neurologic: Alert and oriented x4, moving all extremities with good strength     Pt was seen and examined on the day of discharge      Vitals:  VITAL SIGNS: 24 HRS MIN & MAX LAST   Temp  Min: 96.8 °F (36 °C)  Max: 98.3 °F (36.8 °C) 97.6 °F (36.4 °C)   BP  Min: 100/64  Max: 170/93 (!) 140/81     Pulse  Min: 58  Max: 86  66   Resp  Min: 16  Max: 47 16   SpO2  Min: 94 %  Max: 100 % 96 %           Procedures Performed: No admission procedures for hospital encounter.     Significant Diagnostic Studies: See Full reports for all details    Recent Labs   Lab 03/21/23  1246 03/22/23  0514 03/23/23  0432   WBC 6.8 6.0 10.1   RBC 5.22 5.18 4.98   HGB 15.6 15.5 14.7   HCT 45.5 45.4 43.2   MCV 87.2 87.6 86.7   MCH 29.9 29.9 29.5   MCHC 34.3 34.1 34.0   RDW 12.6 12.6 12.2    204 226   MPV 9.5 10.1 10.0       Recent Labs   Lab 03/20/23  1330 03/21/23  0518 03/22/23  0513 03/23/23  0432    137 140 138   K 4.2 4.3 4.3 4.8   CO2 28 26 25 24   BUN 10.4 9.6 13.1 17.0   CREATININE 1.18 1.21* 1.15 1.18   CALCIUM 10.1 9.2 8.9 9.3   MG  --   --  1.90  --    ALBUMIN 4.5  --  3.6 3.6   ALKPHOS 92  --  80 83   ALT 20  --  15 15   AST 17  --  14 17   BILITOT 0.6  --  0.7 0.5        Microbiology Results (last 7 days)       ** No results found for the last 168 hours. **             MRI Lumbar Spine Without Contrast  Narrative: EXAMINATION:  MRI LUMBAR SPINE WITHOUT CONTRAST    CLINICAL HISTORY:  lumbar stenosis;    TECHNIQUE:  Multiplanar, multisequence MR images were acquired from the thoracolumbar junction to the sacrum without the administration of contrast.    COMPARISON:  09/01/2021    FINDINGS:  The vertebral body heights are well maintained.  Alignment is well maintained.  No fracture is a the seen.  No dislocation is seen.  Cord  ends at T12-L1.  Conus appears unremarkable.    At the level L1-L2 and L2-L3 no significant disc bulge or herniation is seen.  No spinal or foraminal stenosis is seen.  Mild facet arthropathy is seen at those levels.    At L3-L4 mild facet arthropathy is seen bilaterally.  There is a mild broad-based disc osteophyte complex seen which is left paracentral.  There is left foraminal stenosis and left nerve root impingement.  Canal is not narrowed and measures 11 mm.  Findings are slightly worse when compared to the prior examination.    At the level L4-5 there is a broad-based disc osteophyte complex seen.  There is bilateral foraminal stenosis and nerve impingement.  There is acquired spinal stenosis.  The canal is narrowed to 8.6 mm.  Moderate facet arthropathy seen bilaterally.  The findings are slightly worse when compared the prior examination.    At the level of L5-S1 there is a broad-based disc osteophyte complex seen.  There is bilateral foraminal stenosis or nerve impingement.  Moderate facet arthropathy seen bilaterally.  Canal is narrowed to 9 mm.  Findings are similar to the prior examination.  Impression: Multilevel degenerative changes seen as outlined above overall the findings are slightly worse when compared to the prior examination    Electronically signed by: Florencio Cooper  Date:    03/22/2023  Time:    17:20  MRI Brain Without Contrast  Narrative: EXAMINATION:  MRI BRAIN WITHOUT CONTRAST    CLINICAL HISTORY:  Stroke, follow up;    TECHNIQUE:  Multiplanar multisequence MR imaging of the brain was performed without contrast.    COMPARISON:  None    FINDINGS:  No intracranial mass or lesion is seen.  No hemorrhage is seen.  No acute infarct is seen.  No diffusion abnormality seen.  There is diffuse cerebral atrophy seen along with some compensatory ventricular dilatation and periventricular white matter change consistent with patient's age.  Posterior fossa appears normal.  Calvarium is intact.   Paranasal sinuses appear grossly unremarkable.  Impression: Chronic age related changes    Otherwise unremarkable    Electronically signed by: Florencio Cooper  Date:    03/22/2023  Time:    15:19  Echo  · Concentric hypertrophy and normal systolic function.  · Normal central venous pressure (3 mmHg).  · There is no evidence of intracardiac shunting.  · Normal right ventricular size with normal right ventricular systolic   function.  · The estimated ejection fraction is 65%.  · Normal left ventricular diastolic function.            Medication List        CONTINUE taking these medications      cholecalciferol (vitamin D3) 50 mcg (2,000 unit) Cap capsule  Commonly known as: VITAMIN D3     cyclobenzaprine 10 MG tablet  Commonly known as: FLEXERIL     gabapentin 300 MG capsule  Commonly known as: NEURONTIN  Take 1 capsule (300 mg total) by mouth 2 (two) times daily.     ibuprofen 200 MG tablet  Commonly known as: ADVIL,MOTRIN     olmesartan 5 MG Tab  Commonly known as: BENICAR  TAKE 1 TABLET(5 MG) BY MOUTH TWICE DAILY     orphenadrine 100 mg tablet  Commonly known as: NORFLEX  Take 1 tablet (100 mg total) by mouth 2 (two) times daily.     oxyCODONE-acetaminophen  mg per tablet  Commonly known as: PERCOCET     rosuvastatin 10 MG tablet  Commonly known as: CRESTOR  Take 1 tablet (10 mg total) by mouth every evening.            STOP taking these medications      hyoscyamine 0.125 mg Subl  Commonly known as: LEVSIN/SL     ondansetron 4 MG Tbdl  Commonly known as: ZOFRAN-ODT     tamsulosin 0.4 mg Cap  Commonly known as: FLOMAX     traMADoL 50 mg tablet  Commonly known as: ULTRAM               Where to Get Your Medications        These medications were sent to [id number] - [place] [address] AT [facility] & [address] [address]      Phone: [phone]   gabapentin 300 MG capsule          Explained in detail to the patient about the discharge plan,  medications, and follow-up visits. Pt understands and agrees with the treatment plan  Discharge Disposition: Home or Self Care   Discharged Condition: stable  Diet-   Dietary Orders (From admission, onward)       Start     Ordered    03/22/23 1639  Diet Cardiac  Diet effective now         03/22/23 1638                   Medications Per DC med rec  Activities as tolerated   Follow-up Information       Nick Monzon MD Follow up.    Specialty: Internal Medicine  Contact information:  [address] [place] [address] [id number] [phone]               Marcelino Rodriguez MD Follow up.    Specialty: Neurosurgery  Contact information:  [address] [place] [address]  [address]  [facility] [address] [phone]                           For further questions contact hospitalist office    Discharge time 33 minutes    For worsening symptoms, chest pain, shortness of breath, increased abdominal pain, high grade fever, stroke or stroke like symptoms, immediately go to the nearest Emergency Room or call 911 as soon as possible.      Melnida Mills M.D on 3/23/2023. at 12:39 PM.

## 2023-03-23 NOTE — CONSULTS
[facility] - Observation Unit  Neurosurgery  Consult Note    Inpatient consult to Neurosurgery  Consult performed by: HERLINDA Parson  Consult ordered by: Melinda Moon MD  Reason for consult: Back pain, patient of Dr. Rodriguez      Subjective:     Chief Complaint/Reason for Admission: Back pain    History of Present Illness: Patient is a 52-year-old male with PMHx significant for HTN, cervical radiculopathy and chronic back pain, who presented to the ER on 3/20 for dizziness that started on 3/19.  Patient reported that he had relatively abrupt onset of decreased visual acuity and significant dizziness that made him have to take a rest for some time on Sunday. On Monday he attempted to go to work but developed severe dizziness again therefore presented to the ER.  He was afebrile and hemodynamically stable on arrival.  CT of his head showed no acute process and laboratory work was unremarkable.  Out of concern for posterior circulation CVA, hospitalist service was consulted for admission. He was evaluated by neurology for CVA; patient seen by Dr. Cabrera who stated very low suspicion for vascular event and did not meet criteria for TIA. Cardiology has been consulted for syncope work up.    Of note, patient is followed by Dr. Rodriguez for chronic back pain. He was seen on 3/8/23 for post-operative follow-up s/p  C3-5 laminectomies, bilateral C2-C6 posterior instrumentation; bilateral L4-5, L5-S1 decompression, and right L5-S1 microdiskectomy that was done on 3/8/22. At this appt he complained of electrical, nerve type pain down bilateral posterior legs that began a few months after surgery. The pain increases the further he walks.  He must stop and rest for a few minutes before he is able to continue walking.  He finds that leaning forward on something helps. Plan was to slowly increase gabapentin and he was scheduled for MRI to further assess. Since patient is here in the hospital, the  hospitalist did order MRI with anesthesia and this has been completed. We were consulted for evaluation and treatment recommendations.    PTA Medications   Medication Sig    gabapentin (NEURONTIN) 300 MG capsule Take 1 capsule (300 mg total) by mouth 3 (three) times daily. (Patient taking differently: Take 300 mg by mouth 2 (two) times daily.)    olmesartan (BENICAR) 5 MG Tab TAKE 1 TABLET(5 MG) BY MOUTH TWICE DAILY    orphenadrine (NORFLEX) 100 mg tablet Take 1 tablet (100 mg total) by mouth 2 (two) times daily.    rosuvastatin (CRESTOR) 10 MG tablet Take 1 tablet (10 mg total) by mouth every evening.    cholecalciferol, vitamin D3, (VITAMIN D3) 50 mcg (2,000 unit) Cap capsule Take by mouth once daily.    cyclobenzaprine (FLEXERIL) 10 MG tablet TAKE 1 TABLET BY MOUTH THREE TIMES DAILY AS NEEDED FOR SPASM    hyoscyamine (LEVSIN/SL) 0.125 mg Subl Place 1 tablet (0.125 mg total) under the tongue every 4 (four) hours as needed. (Patient not taking: Reported on 3/8/2023)    ibuprofen (ADVIL,MOTRIN) 200 MG tablet Take 200 mg by mouth every 6 (six) hours as needed for Pain.    ondansetron (ZOFRAN-ODT) 4 MG TbDL Take 1 tablet (4 mg total) by mouth every 6 (six) hours as needed (nausea/vomiting). (Patient not taking: Reported on 3/8/2023)    oxyCODONE-acetaminophen (PERCOCET)  mg per tablet Take by mouth.    tamsulosin (FLOMAX) 0.4 mg Cap Take 1 capsule (0.4 mg total) by mouth once daily. for 14 days    traMADoL (ULTRAM) 50 mg tablet Take 1 tablet (50 mg total) by mouth every 8 (eight) hours as needed for Pain. (Patient not taking: Reported on 3/8/2023)       Review of patient's allergies indicates:   Allergen Reactions    Cefdinir Anaphylaxis    Penicillins Anaphylaxis    Hydrocodone-acetaminophen Itching    Morphine Hives    Opioids - morphine analogues Hives       Past Medical History:   Diagnosis Date    Degeneration, intervertebral disc, cervicothoracic     Herniated nucleus pulposus with myelopathy, cervical      High cholesterol     Hypertension     Kidney stones     Muscle spasms of neck     PONV (postoperative nausea and vomiting)     Spinal stenosis of lumbar region with neurogenic claudication     Stenosis of cervical spine with myelopathy      Past Surgical History:   Procedure Laterality Date    APPENDECTOMY      Bilateral L4-5, L5-S1 decompression; right L5-S1 microdiscectomy  03/08/2022    Dr. Rodriguez    C3-5 laminectomies, C2-C6 posterior instrumented fusion  03/08/2022    Dr. Rodriguez    CHOLECYSTECTOMY      colonocscopy      CYSTOSCOPY W/ URETERAL STENT PLACEMENT Left 12/21/2022    Procedure: CYSTOSCOPY, WITH URETERAL STENT INSERTION;  Surgeon: Ramirez Wei MD;  Location: [facility];  Service: Urology;  Laterality: Left;  LEFT URETERAL STENT PLACEMENT    CYSTOURETEROSCOPY, WITH HOLMIUM LASER LITHOTRIPSY OF URETERAL CALCULUS AND STENT INSERTION Left 2/3/2023    Procedure: CYSTOURETEROSCOPY, WITH HOLMIUM LASER LITHOTRIPSY OF URETERAL CALCULUS AND STENT INSERTION / Stent exchange Left;  Surgeon: Ramirez Wei MD;  Location: [facility];  Service: Urology;  Laterality: Left;    LEFT HEART CATHETERIZATION  10/16/2017    MAGNETIC RESONANCE IMAGING N/A 3/22/2023    Procedure: MRI (Magnetic Resonance Imagine);  Surgeon: Yash Murphy MD;  Location: [facility];  Service: Medicine;  Laterality: N/A;  MRI LUMBAR SPINE W/O CONTRAST / MRI BRAIN W/O CONTRAST WITH ANESTHESIA    PARATHYROIDECTOMY N/A 05/06/2022    Procedure: PARATHYROIDECTOMY;  Surgeon: Ajay Blum MD;  Location: [facility];  Service: ENT;  Laterality: N/A;  PARATHYROIDECTOMY // NERVE MONITOR // FROZEN SECTION // INTRA OP PTH // SUPINE     Family History       Problem Relation (Age of Onset)    Cancer Mother, Father    Liver cancer Mother    Liver disease Sister          Tobacco Use    Smoking status: Former    Smokeless tobacco: Never   Substance and Sexual Activity    Alcohol use: Yes     Alcohol/week: 2.0 standard drinks     Types: 2 Standard  drinks or equivalent per week     Comment: SOCIALLY    Drug use: No    Sexual activity: Not on file     Review of Systems  Objective:   12 pt ROS WNL, except for HPI    Weight: 104.3 kg (230 lb)  Body mass index is 31.19 kg/m².  Vital Signs (Most Recent):  Temp: 98.2 °F (36.8 °C) (03/23/23 0700)  Pulse: 68 (03/23/23 0700)  Resp: 16 (03/23/23 0700)  BP: (!) 107/54 (03/23/23 0412)  SpO2: 98 % (03/23/23 0700)   Vital Signs (24h Range):  Temp:  [96.8 °F (36 °C)-98.3 °F (36.8 °C)] 98.2 °F (36.8 °C)  Pulse:  [58-86] 68  Resp:  [16-47] 16  SpO2:  [94 %-100 %] 98 %  BP: (100-170)/(54-93) 107/54     Neurosurgery Physical Exam  General:  Pleasant. Well-nourished. Alert. No acute distress.     Head:  Normocephalic, without obvious abnormality, atraumatic     Lungs:   Breathing is quiet, non-lablored     Neurological:    Oriented to Person, Place, Time   Speech:  normal  Memory, cognition, and affect are appropriate.  Motor Strength: Moves all extremities spontaneously with good tone.  No abnormal movements seen. Normal 5/5 strength in all tested muscle groups and no muscle wasting or atrophy  Conteh's is positive bilaterally     Cervical and Lumbar incision:  Well healed     Gait:  normal, able to walk on heels and toes without difficulty    Significant Labs:  Recent Labs   Lab 03/22/23  0513 03/23/23  0432    138   K 4.3 4.8   CO2 25 24   BUN 13.1 17.0   CREATININE 1.15 1.18   CALCIUM 8.9 9.3   MG 1.90  --      Recent Labs   Lab 03/21/23  1246 03/22/23  0514 03/23/23  0432   WBC 6.8 6.0 10.1   HGB 15.6 15.5 14.7   HCT 45.5 45.4 43.2    204 226     Recent Labs   Lab 03/22/23  0514   INR 1.09     Microbiology Results (last 7 days)       ** No results found for the last 168 hours. **            Significant Diagnostics:  MRI Lumbar Spine Without Contrast [[id number]] Resulted: 03/22/23 1720   Order Status: Completed Updated: 03/22/23 1723   Narrative:     EXAMINATION:   MRI LUMBAR SPINE WITHOUT CONTRAST     CLINICAL  HISTORY:   lumbar stenosis;     TECHNIQUE:   Multiplanar, multisequence MR images were acquired from the thoracolumbar junction to the sacrum without the administration of contrast.     COMPARISON:   09/01/2021     FINDINGS:   The vertebral body heights are well maintained.  Alignment is well maintained.  No fracture is a the seen.  No dislocation is seen.  Cord ends at T12-L1.  Conus appears unremarkable.     At the level L1-L2 and L2-L3 no significant disc bulge or herniation is seen.  No spinal or foraminal stenosis is seen.  Mild facet arthropathy is seen at those levels.     At L3-L4 mild facet arthropathy is seen bilaterally.  There is a mild broad-based disc osteophyte complex seen which is left paracentral.  There is left foraminal stenosis and left nerve root impingement.  Canal is not narrowed and measures 11 mm.  Findings are slightly worse when compared to the prior examination.     At the level L4-5 there is a broad-based disc osteophyte complex seen.  There is bilateral foraminal stenosis and nerve impingement.  There is acquired spinal stenosis.  The canal is narrowed to 8.6 mm.  Moderate facet arthropathy seen bilaterally.  The findings are slightly worse when compared the prior examination.     At the level of L5-S1 there is a broad-based disc osteophyte complex seen.  There is bilateral foraminal stenosis or nerve impingement.  Moderate facet arthropathy seen bilaterally.  Canal is narrowed to 9 mm.  Findings are similar to the prior examination.    Impression:       Multilevel degenerative changes seen as outlined above overall the findings are slightly worse when compared to the prior examination        Assessment/Plan:     Active Diagnoses:    Diagnosis Date Noted POA    PRINCIPAL PROBLEM:  Near syncope [R55] 03/21/2023 Yes    TIA (transient ischemic attack) [G45.9] 03/21/2023 Yes      Problems Resolved During this Admission:     He is motor intact to bilateral LE. His back and LE complaints are  unchanged from his appt on 3/8/23 with Dr. Rodriguez  We did discuss the results of his MRI  Dr. Rodriguez is currently out of town and he will return next week. Patient would like to keep his outpt follow up appt with him to discuss treatment options.   I will notify the office that MRI has been completed  OK to dc home from our standpoint    Thank you for your consult. I will sign off. Please contact us if you have any additional questions.    HERLINDA Parson  Neurosurgery  [facility] - Observation Unit

## 2023-03-24 ENCOUNTER — OFFICE VISIT (OUTPATIENT)
Dept: URGENT CARE | Facility: CLINIC | Age: 53
End: 2023-03-24
Payer: COMMERCIAL

## 2023-03-24 VITALS
SYSTOLIC BLOOD PRESSURE: 143 MMHG | BODY MASS INDEX: 31.15 KG/M2 | HEART RATE: 60 BPM | DIASTOLIC BLOOD PRESSURE: 90 MMHG | OXYGEN SATURATION: 98 % | HEIGHT: 72 IN | RESPIRATION RATE: 18 BRPM | WEIGHT: 230 LBS | TEMPERATURE: 98 F

## 2023-03-24 DIAGNOSIS — S05.02XA CORNEAL ABRASION, LEFT, INITIAL ENCOUNTER: ICD-10-CM

## 2023-03-24 DIAGNOSIS — T15.92XA FOREIGN BODY, EYE, LEFT, INITIAL ENCOUNTER: ICD-10-CM

## 2023-03-24 LAB
LEFT CCA DIST DIAS: 15 CM/S
LEFT CCA DIST SYS: 58 CM/S
LEFT CCA PROX DIAS: 19 CM/S
LEFT CCA PROX SYS: 103 CM/S
LEFT ECA DIAS: 8 CM/S
LEFT ECA SYS: 67 CM/S
LEFT ICA DIST DIAS: 24 CM/S
LEFT ICA DIST SYS: 66 CM/S
LEFT ICA MID DIAS: 20 CM/S
LEFT ICA MID SYS: 65 CM/S
LEFT ICA PROX DIAS: 16 CM/S
LEFT ICA PROX SYS: 42 CM/S
LEFT VERTEBRAL DIAS: 14 CM/S
LEFT VERTEBRAL SYS: 43 CM/S
OHS CV CAROTID RIGHT ICA EDV HIGHEST: 19
OHS CV CAROTID ULTRASOUND LEFT ICA/CCA RATIO: 1.14
OHS CV CAROTID ULTRASOUND RIGHT ICA/CCA RATIO: 0.9
OHS CV PV CAROTID LEFT HIGHEST CCA: 103
OHS CV PV CAROTID LEFT HIGHEST ICA: 66
OHS CV PV CAROTID RIGHT HIGHEST CCA: 88
OHS CV PV CAROTID RIGHT HIGHEST ICA: 60
OHS CV US CAROTID LEFT HIGHEST EDV: 24
RIGHT CCA DIST DIAS: 16 CM/S
RIGHT CCA DIST SYS: 67 CM/S
RIGHT CCA PROX DIAS: 17 CM/S
RIGHT CCA PROX SYS: 88 CM/S
RIGHT ECA DIAS: 12 CM/S
RIGHT ECA SYS: 85 CM/S
RIGHT ICA DIST DIAS: 19 CM/S
RIGHT ICA DIST SYS: 58 CM/S
RIGHT ICA MID DIAS: 13 CM/S
RIGHT ICA MID SYS: 60 CM/S
RIGHT ICA PROX DIAS: 13 CM/S
RIGHT ICA PROX SYS: 55 CM/S
RIGHT VERTEBRAL DIAS: 14 CM/S
RIGHT VERTEBRAL SYS: 43 CM/S

## 2023-03-24 PROCEDURE — 99213 PR OFFICE/OUTPT VISIT, EST, LEVL III, 20-29 MIN: ICD-10-PCS | Mod: ,,, | Performed by: NURSE PRACTITIONER

## 2023-03-24 PROCEDURE — 99213 OFFICE O/P EST LOW 20 MIN: CPT | Mod: ,,, | Performed by: NURSE PRACTITIONER

## 2023-03-24 RX ORDER — ERYTHROMYCIN 5 MG/G
OINTMENT OPHTHALMIC 3 TIMES DAILY
Qty: 3.5 G | Refills: 1 | Status: SHIPPED | OUTPATIENT
Start: 2023-03-24 | End: 2023-03-31

## 2023-03-24 NOTE — PROGRESS NOTES
"Subjective:       Patient ID: Eb Alfaro is a 52 y.o. male.    Vitals:  height is 6' (1.829 m) and weight is 104.3 kg (230 lb). His oral temperature is 98.2 °F (36.8 °C). His blood pressure is 142/88 (abnormal) and his pulse is 65. His respiration is 18 and oxygen saturation is 98%.     Chief Complaint: Eye Injury (Irritation in in left eye x today. Got swimming pool solution in eye;contains "Calcium Hypochlorite". 20/40 vision bilateral, 20/40 R eye,and 20/40 L eye. )    52-year-old male presents with left eye irritation.  States just prior to arrival swimming pool solution went into his left eye.  Eye irrigation attempted with water    Eyes:  Positive for foreign body in eye, eye pain and eye redness. Negative for eye discharge, eye itching, photophobia, vision loss, double vision, blurred vision and eyelid swelling.     Objective:      Physical Exam   Eyes: Lids are normal. Pupils are equal, round, and reactive to light. Lids are everted and swept, no foreign bodies found. vision grossly intact gaze aligned appropriately      Comments: Redness noted to the left eye, applied  drops of tetracaine,  relief noted, left eye irrigated with sterile eye wash solution.  No foreign bodies noted with a Wood's lamp exam.  Patient states no pain or discomfort.   Abdominal: Normal appearance.   Neurological: He is alert.   Nursing note and vitals reviewed.    The left eye flushed with 10 mL of sterile eye wash solution.  Tetracaine, Wood's lamp, fluorescein dye used to identify a small 3:00 o'clock  corneal abrasion.      Assessment:      1. Foreign body, eye, left, initial encounter    2. Corneal abrasion, left, initial encounter            Plan:     Apply prescription medication to the eye as directed.  May use refresh or artificial tears OTC as directed.  Tylenol or ibuprofen OTC as directed for pain, Benadryl for itching  Follow up with an ophthalmologist if symptoms worsen or persist as instructed.   Go to ER for " visual disturbances, worsening foreign body sensation or for concerns as instructed  Foreign body, eye, left, initial encounter    Corneal abrasion, left, initial encounter  -     erythromycin (ROMYCIN) ophthalmic ointment; Place into the right eye 3 (three) times daily. for 7 days  Dispense: 3.5 g; Refill: 1

## 2023-04-28 ENCOUNTER — HOSPITAL ENCOUNTER (OUTPATIENT)
Dept: RADIOLOGY | Facility: HOSPITAL | Age: 53
Discharge: HOME | End: 2023-04-28
Attending: NEUROLOGICAL SURGERY
Payer: COMMERCIAL

## 2023-04-28 ENCOUNTER — OFFICE VISIT (OUTPATIENT)
Dept: PRIMARY CARE CLINIC | Facility: CLINIC | Age: 53
End: 2023-04-28
Payer: COMMERCIAL

## 2023-04-28 ENCOUNTER — TELEPHONE (OUTPATIENT)
Dept: PRIMARY CARE CLINIC | Facility: CLINIC | Age: 53
End: 2023-04-28

## 2023-04-28 VITALS
WEIGHT: 229 LBS | DIASTOLIC BLOOD PRESSURE: 74 MMHG | TEMPERATURE: 98 F | HEIGHT: 72 IN | OXYGEN SATURATION: 98 % | HEART RATE: 67 BPM | SYSTOLIC BLOOD PRESSURE: 118 MMHG | RESPIRATION RATE: 20 BRPM | BODY MASS INDEX: 31.02 KG/M2

## 2023-04-28 DIAGNOSIS — M48.062 LUMBAR STENOSIS WITH NEUROGENIC CLAUDICATION: ICD-10-CM

## 2023-04-28 DIAGNOSIS — M54.16 LUMBAR RADICULOPATHY: ICD-10-CM

## 2023-04-28 DIAGNOSIS — G99.2 STENOSIS OF CERVICAL SPINE WITH MYELOPATHY: ICD-10-CM

## 2023-04-28 DIAGNOSIS — R10.9 RIGHT FLANK PAIN: Primary | ICD-10-CM

## 2023-04-28 DIAGNOSIS — R10.9 ABDOMINAL PAIN, UNSPECIFIED ABDOMINAL LOCATION: ICD-10-CM

## 2023-04-28 DIAGNOSIS — M48.02 STENOSIS OF CERVICAL SPINE WITH MYELOPATHY: ICD-10-CM

## 2023-04-28 LAB
BILIRUB SERPL-MCNC: NEGATIVE MG/DL
BLOOD URINE, POC: NORMAL
CLARITY, POC UA: NORMAL
COLOR, POC UA: NORMAL
GLUCOSE UR QL STRIP: NEGATIVE
KETONES UR QL STRIP: NEGATIVE
LEUKOCYTE ESTERASE URINE, POC: NEGATIVE
NITRITE, POC UA: NEGATIVE
PH, POC UA: 6.5
PROTEIN, POC: NEGATIVE
SPECIFIC GRAVITY, POC UA: >=1.03
UROBILINOGEN, POC UA: 0.2

## 2023-04-28 PROCEDURE — 99213 PR OFFICE/OUTPT VISIT, EST, LEVL III, 20-29 MIN: ICD-10-PCS | Mod: ,,, | Performed by: STUDENT IN AN ORGANIZED HEALTH CARE EDUCATION/TRAINING PROGRAM

## 2023-04-28 PROCEDURE — 81002 URINALYSIS NONAUTO W/O SCOPE: CPT | Mod: ,,, | Performed by: STUDENT IN AN ORGANIZED HEALTH CARE EDUCATION/TRAINING PROGRAM

## 2023-04-28 PROCEDURE — 1159F PR MEDICATION LIST DOCUMENTED IN MEDICAL RECORD: ICD-10-PCS | Mod: CPTII,,, | Performed by: STUDENT IN AN ORGANIZED HEALTH CARE EDUCATION/TRAINING PROGRAM

## 2023-04-28 PROCEDURE — 1160F RVW MEDS BY RX/DR IN RCRD: CPT | Mod: CPTII,,, | Performed by: STUDENT IN AN ORGANIZED HEALTH CARE EDUCATION/TRAINING PROGRAM

## 2023-04-28 PROCEDURE — 3074F PR MOST RECENT SYSTOLIC BLOOD PRESSURE < 130 MM HG: ICD-10-PCS | Mod: CPTII,,, | Performed by: STUDENT IN AN ORGANIZED HEALTH CARE EDUCATION/TRAINING PROGRAM

## 2023-04-28 PROCEDURE — 3008F PR BODY MASS INDEX (BMI) DOCUMENTED: ICD-10-PCS | Mod: CPTII,,, | Performed by: STUDENT IN AN ORGANIZED HEALTH CARE EDUCATION/TRAINING PROGRAM

## 2023-04-28 PROCEDURE — 99213 OFFICE O/P EST LOW 20 MIN: CPT | Mod: ,,, | Performed by: STUDENT IN AN ORGANIZED HEALTH CARE EDUCATION/TRAINING PROGRAM

## 2023-04-28 PROCEDURE — 1160F PR REVIEW ALL MEDS BY PRESCRIBER/CLIN PHARMACIST DOCUMENTED: ICD-10-PCS | Mod: CPTII,,, | Performed by: STUDENT IN AN ORGANIZED HEALTH CARE EDUCATION/TRAINING PROGRAM

## 2023-04-28 PROCEDURE — 3078F DIAST BP <80 MM HG: CPT | Mod: CPTII,,, | Performed by: STUDENT IN AN ORGANIZED HEALTH CARE EDUCATION/TRAINING PROGRAM

## 2023-04-28 PROCEDURE — 4010F ACE/ARB THERAPY RXD/TAKEN: CPT | Mod: CPTII,,, | Performed by: STUDENT IN AN ORGANIZED HEALTH CARE EDUCATION/TRAINING PROGRAM

## 2023-04-28 PROCEDURE — 3078F PR MOST RECENT DIASTOLIC BLOOD PRESSURE < 80 MM HG: ICD-10-PCS | Mod: CPTII,,, | Performed by: STUDENT IN AN ORGANIZED HEALTH CARE EDUCATION/TRAINING PROGRAM

## 2023-04-28 PROCEDURE — 3008F BODY MASS INDEX DOCD: CPT | Mod: CPTII,,, | Performed by: STUDENT IN AN ORGANIZED HEALTH CARE EDUCATION/TRAINING PROGRAM

## 2023-04-28 PROCEDURE — 1159F MED LIST DOCD IN RCRD: CPT | Mod: CPTII,,, | Performed by: STUDENT IN AN ORGANIZED HEALTH CARE EDUCATION/TRAINING PROGRAM

## 2023-04-28 PROCEDURE — 81002 POCT URINE DIPSTICK WITHOUT MICROSCOPE: ICD-10-PCS | Mod: ,,, | Performed by: STUDENT IN AN ORGANIZED HEALTH CARE EDUCATION/TRAINING PROGRAM

## 2023-04-28 PROCEDURE — 3074F SYST BP LT 130 MM HG: CPT | Mod: CPTII,,, | Performed by: STUDENT IN AN ORGANIZED HEALTH CARE EDUCATION/TRAINING PROGRAM

## 2023-04-28 PROCEDURE — 4010F PR ACE/ARB THEARPY RXD/TAKEN: ICD-10-PCS | Mod: CPTII,,, | Performed by: STUDENT IN AN ORGANIZED HEALTH CARE EDUCATION/TRAINING PROGRAM

## 2023-04-28 PROCEDURE — 72114 X-RAY EXAM L-S SPINE BENDING: CPT | Mod: TC

## 2023-04-28 NOTE — PROGRESS NOTES
Subjective:       Patient ID: Eb Alfaro is a 52 y.o. male.    ----------------------------  Degeneration, intervertebral disc, cervicothoracic  Herniated nucleus pulposus with myelopathy, cervical  High cholesterol  Hypertension  Kidney stones  Muscle spasms of neck  PONV (postoperative nausea and vomiting)  Spinal stenosis of lumbar region with neurogenic claudication  Stenosis of cervical spine with myelopathy     Chief Complaint: clinic visit  (Right flank pain , onset months , denies injury or n/v, no dysuria/Hx renal stones - left kidney/02/03/2023 cystoureteroscopy)    C/o R flank pain. H/o nephrolithiasis about 2 months ago. Pain does not radiate to groin. Does not radiate to RLE. No dysuria. UA in office today w/trace lysed RBCs.     Review of Systems   Constitutional:  Negative for chills and fever.   HENT:  Negative for sinus pressure/congestion and sore throat.    Respiratory:  Negative for cough, shortness of breath and wheezing.    Cardiovascular:  Negative for chest pain and leg swelling.   Gastrointestinal:  Negative for abdominal pain, nausea and vomiting.   Genitourinary:  Positive for flank pain. Negative for dysuria and hematuria.   Musculoskeletal:  Positive for back pain (chronic).   Integumentary:  Negative for rash.   Neurological:  Negative for dizziness and syncope.   Hematological:  Negative for adenopathy.   Psychiatric/Behavioral:  Negative for confusion. The patient is not nervous/anxious.          Objective:      Physical Exam  Vitals and nursing note reviewed.   Constitutional:       General: He is not in acute distress.  HENT:      Head: Normocephalic.      Nose: No rhinorrhea.   Eyes:      Conjunctiva/sclera: Conjunctivae normal.      Pupils: Pupils are equal, round, and reactive to light.   Cardiovascular:      Rate and Rhythm: Normal rate and regular rhythm.   Pulmonary:      Effort: Pulmonary effort is normal.      Breath sounds: Normal breath sounds.   Abdominal:       Palpations: Abdomen is soft.      Tenderness: There is no abdominal tenderness. There is right CVA tenderness.   Musculoskeletal:         General: No deformity or signs of injury.   Skin:     General: Skin is warm and dry.   Neurological:      General: No focal deficit present.      Mental Status: He is alert. Mental status is at baseline.   Psychiatric:         Mood and Affect: Mood normal.         Behavior: Behavior normal.         Assessment & Plan:   1. Right flank pain  Comments:  high suspicion for nephrolithiasis - recent stone 02/2023, blood on today's UA. Proceed with CT Renal Stone protocol.   Orders:  -     POCT URINE DIPSTICK WITHOUT MICROSCOPE  -     Urinalysis, Reflex to Urine Culture; Future; Expected date: 04/28/2023    2. Abdominal pain, unspecified abdominal location  -     CT Renal Stone Study ABD Pelvis WO; Future; Expected date: 04/28/2023        Keep scheduled f/u. In addition to their scheduled follow up, the patient has also been instructed to follow up on as needed basis.

## 2023-04-28 NOTE — TELEPHONE ENCOUNTER
----- Message from Nick Monzon MD sent at 4/28/2023 10:52 AM CDT -----  Please tell Mr. Alfaro that there is no signs of kidney stones on his CT from today.     I think he should discuss his low back pain with Dr. Rodriguez (has appointment Monday).     Nick Monzon MD

## 2023-05-01 ENCOUNTER — PATIENT MESSAGE (OUTPATIENT)
Dept: ADMINISTRATIVE | Facility: HOSPITAL | Age: 53
End: 2023-05-01
Payer: COMMERCIAL

## 2023-05-01 ENCOUNTER — OFFICE VISIT (OUTPATIENT)
Dept: NEUROSURGERY | Facility: CLINIC | Age: 53
End: 2023-05-01
Payer: COMMERCIAL

## 2023-05-01 VITALS
HEIGHT: 72 IN | WEIGHT: 231 LBS | SYSTOLIC BLOOD PRESSURE: 127 MMHG | BODY MASS INDEX: 31.29 KG/M2 | RESPIRATION RATE: 17 BRPM | DIASTOLIC BLOOD PRESSURE: 78 MMHG | HEART RATE: 62 BPM

## 2023-05-01 DIAGNOSIS — M48.062 LUMBAR STENOSIS WITH NEUROGENIC CLAUDICATION: ICD-10-CM

## 2023-05-01 DIAGNOSIS — M48.02 STENOSIS OF CERVICAL SPINE WITH MYELOPATHY: Primary | ICD-10-CM

## 2023-05-01 DIAGNOSIS — G99.2 STENOSIS OF CERVICAL SPINE WITH MYELOPATHY: Primary | ICD-10-CM

## 2023-05-01 PROCEDURE — 1160F PR REVIEW ALL MEDS BY PRESCRIBER/CLIN PHARMACIST DOCUMENTED: ICD-10-PCS | Mod: CPTII,,, | Performed by: NEUROLOGICAL SURGERY

## 2023-05-01 PROCEDURE — 3008F BODY MASS INDEX DOCD: CPT | Mod: CPTII,,, | Performed by: NEUROLOGICAL SURGERY

## 2023-05-01 PROCEDURE — 1159F MED LIST DOCD IN RCRD: CPT | Mod: CPTII,,, | Performed by: NEUROLOGICAL SURGERY

## 2023-05-01 PROCEDURE — 3078F PR MOST RECENT DIASTOLIC BLOOD PRESSURE < 80 MM HG: ICD-10-PCS | Mod: CPTII,,, | Performed by: NEUROLOGICAL SURGERY

## 2023-05-01 PROCEDURE — 4010F ACE/ARB THERAPY RXD/TAKEN: CPT | Mod: CPTII,,, | Performed by: NEUROLOGICAL SURGERY

## 2023-05-01 PROCEDURE — 3078F DIAST BP <80 MM HG: CPT | Mod: CPTII,,, | Performed by: NEUROLOGICAL SURGERY

## 2023-05-01 PROCEDURE — 3074F PR MOST RECENT SYSTOLIC BLOOD PRESSURE < 130 MM HG: ICD-10-PCS | Mod: CPTII,,, | Performed by: NEUROLOGICAL SURGERY

## 2023-05-01 PROCEDURE — 1159F PR MEDICATION LIST DOCUMENTED IN MEDICAL RECORD: ICD-10-PCS | Mod: CPTII,,, | Performed by: NEUROLOGICAL SURGERY

## 2023-05-01 PROCEDURE — 1160F RVW MEDS BY RX/DR IN RCRD: CPT | Mod: CPTII,,, | Performed by: NEUROLOGICAL SURGERY

## 2023-05-01 PROCEDURE — 3074F SYST BP LT 130 MM HG: CPT | Mod: CPTII,,, | Performed by: NEUROLOGICAL SURGERY

## 2023-05-01 PROCEDURE — 4010F PR ACE/ARB THEARPY RXD/TAKEN: ICD-10-PCS | Mod: CPTII,,, | Performed by: NEUROLOGICAL SURGERY

## 2023-05-01 PROCEDURE — 99213 OFFICE O/P EST LOW 20 MIN: CPT | Mod: ,,, | Performed by: NEUROLOGICAL SURGERY

## 2023-05-01 PROCEDURE — 3008F PR BODY MASS INDEX (BMI) DOCUMENTED: ICD-10-PCS | Mod: CPTII,,, | Performed by: NEUROLOGICAL SURGERY

## 2023-05-01 PROCEDURE — 99213 PR OFFICE/OUTPT VISIT, EST, LEVL III, 20-29 MIN: ICD-10-PCS | Mod: ,,, | Performed by: NEUROLOGICAL SURGERY

## 2023-05-01 RX ORDER — ORPHENADRINE CITRATE 100 MG/1
100 TABLET, EXTENDED RELEASE ORAL 2 TIMES DAILY PRN
Qty: 60 TABLET | Refills: 2 | Status: SHIPPED | OUTPATIENT
Start: 2023-05-01 | End: 2023-08-15 | Stop reason: SDUPTHER

## 2023-05-01 RX ORDER — ONDANSETRON HYDROCHLORIDE 8 MG/1
8 TABLET, FILM COATED ORAL
COMMUNITY
Start: 2023-02-03 | End: 2023-08-15 | Stop reason: SDUPTHER

## 2023-05-01 NOTE — PROGRESS NOTES
[facility]  Neurosurgery        Eb Alfaro   [id number]   1970       SUBJECTIVE:     CHIEF COMPLAINT:  Pain in the right lower back area    HPI:  Eb Alfaro is a 52 y.o. male who presents for follow up for lumbar pain.  He complains of pain in the right lower back.  He describes it as feeling like a bruise.  He denies any pain in the legs.  The lower back pain increases the further he walks.  He must stop and rest for a few minutes before he is able to continue walking.  He finds that leaning forward on something helps.  He is s/p  C3-5 laminectomies, bilateral C2-C6 posterior instrumentation; bilateral L4-5, L5-S1 decompression, and right L5-S1 microdiskectomy that was done on 3/8/22.  His lumbar symptoms improved initially after surgery.  He began to have increasing pain in the right lower back a few months ago.      He continues to report much improvement in preoperative neck symptoms.  He continues with a pins/needles sensation in the right upper trapezius region that has been present since the surgery.  He has numbness/tingling in the forearms, hands, and last two fingers when his arms are bent at the elbows.  He must straighten the arms for relief.  He otherwise denies any upper extremity symptoms.          Review of patient's allergies indicates:   Allergen Reactions    Cefdinir Anaphylaxis    Penicillins Anaphylaxis    Hydrocodone-acetaminophen Itching    Morphine Hives    Opioids - morphine analogues Hives     Current Outpatient Medications   Medication Sig Dispense Refill    cholecalciferol, vitamin D3, (VITAMIN D3) 50 mcg (2,000 unit) Cap capsule Take by mouth once daily.      gabapentin (NEURONTIN) 300 MG capsule Take 1 capsule (300 mg total) by mouth 2 (two) times daily. 60 capsule 11    ibuprofen (ADVIL,MOTRIN) 200 MG tablet Take 200 mg by mouth every 6 (six) hours as needed for Pain.      olmesartan (BENICAR) 5 MG Tab TAKE 1 TABLET(5 MG) BY MOUTH TWICE DAILY 180 tablet  3    ondansetron (ZOFRAN) 8 MG tablet Take 8 mg by mouth as needed.      rosuvastatin (CRESTOR) 10 MG tablet Take 1 tablet (10 mg total) by mouth every evening. 90 tablet 3    cyclobenzaprine (FLEXERIL) 10 MG tablet TAKE 1 TABLET BY MOUTH THREE TIMES DAILY AS NEEDED FOR SPASM      orphenadrine (NORFLEX) 100 mg tablet Take 1 tablet (100 mg total) by mouth 2 (two) times daily as needed for Muscle spasms. 60 tablet 2    oxyCODONE-acetaminophen (PERCOCET)  mg per tablet Take by mouth.       No current facility-administered medications for this visit.      Past Medical History:   Diagnosis Date    Degeneration, intervertebral disc, cervicothoracic     Herniated nucleus pulposus with myelopathy, cervical     High cholesterol     Hypertension     Kidney stones     Muscle spasms of neck     PONV (postoperative nausea and vomiting)     Spinal stenosis of lumbar region with neurogenic claudication     Stenosis of cervical spine with myelopathy      Past Surgical History:   Procedure Laterality Date    APPENDECTOMY      Bilateral L4-5, L5-S1 decompression; right L5-S1 microdiscectomy  03/08/2022    Dr. Rodriguez    C3-5 laminectomies, C2-C6 posterior instrumented fusion  03/08/2022    Dr. Rodriguez    CHOLECYSTECTOMY      colonocscopy      CYSTOSCOPY W/ URETERAL STENT PLACEMENT Left 12/21/2022    Procedure: CYSTOSCOPY, WITH URETERAL STENT INSERTION;  Surgeon: Ramirez Wei MD;  Location: [facility];  Service: Urology;  Laterality: Left;  LEFT URETERAL STENT PLACEMENT    CYSTOURETEROSCOPY, WITH HOLMIUM LASER LITHOTRIPSY OF URETERAL CALCULUS AND STENT INSERTION Left 2/3/2023    Procedure: CYSTOURETEROSCOPY, WITH HOLMIUM LASER LITHOTRIPSY OF URETERAL CALCULUS AND STENT INSERTION / Stent exchange Left;  Surgeon: Ramirez Wei MD;  Location: [facility];  Service: Urology;  Laterality: Left;    LEFT HEART CATHETERIZATION  10/16/2017    MAGNETIC RESONANCE IMAGING N/A 3/22/2023    Procedure: MRI (Magnetic Resonance Imagine);   Surgeon: Yash Murphy MD;  Location: [facility];  Service: Medicine;  Laterality: N/A;  MRI LUMBAR SPINE W/O CONTRAST / MRI BRAIN W/O CONTRAST WITH ANESTHESIA    PARATHYROIDECTOMY N/A 05/06/2022    Procedure: PARATHYROIDECTOMY;  Surgeon: Ajay Blum MD;  Location: [facility];  Service: ENT;  Laterality: N/A;  PARATHYROIDECTOMY // NERVE MONITOR // FROZEN SECTION // INTRA OP PTH // SUPINE     Family History   Problem Relation Age of Onset    Cancer Mother         meningiomas    Liver cancer Mother     Cancer Father         colon    Liver disease Sister      Social History     Tobacco Use    Smoking status: Former    Smokeless tobacco: Never   Substance Use Topics    Alcohol use: Yes     Alcohol/week: 2.0 standard drinks     Types: 2 Standard drinks or equivalent per week     Comment: SOCIALLY    Drug use: No        Review of Systems:    Pertinent items are noted in HPI.        OBJECTIVE:     Vital Signs (Most Recent)  Pulse: 62 (05/01/23 1142)  Resp: 17 (05/01/23 1142)  BP: 127/78 (05/01/23 1142)    Physical Exam:  General:  alert, no distress, cooperative    Head:  Normocephalic, without obvious abnormality, atraumatic    Lungs:  Breathing is quiet, non-lablored    Neurological:    Oriented to Person, Place, Time   Speech:  normal  Memory, cognition, and affect are appropriate.    Muscle strength against resistance:    Strength  Hip Flexors Knee extensor Knee Flexion Ankle Dorsiflexion Plantar Flexion EHL Hip Adductor   Lower: R 5/5 5/5 5/5 5/5 5/5 5/5 5/5    L 5/5 5/5 5/5 5/5 5/5 5/5 5/5     Reflexes:   Right Left   Triceps 2+ 2+   Biceps 2+ 2+   Brachioradialis 2+ 2+   Patellar 2+ 2+   Achilles 2+ 2+     Sensation is intact in bilateral lower extremities to a pinprick.    Conteh: positive (both)    Gait:  normal    Coordination:  Able to walk on heels & toes without difficulty    Musculoskeletal:   SI joint palpation: Tenderness to the right SI joint palpation.    MRI of the lumbar spine from 03/22/2023  shows moderate stenosis at T12-L1 from facet hypertrophy.  There are significant discogenic degenerative changes at L3-4, L4-5 and L5-S1.  There is moderately severe stenosis at L4-5 and to a lesser degree at L5-S1.  There significant bilateral, left-greater-than-right, foraminal stenosis at these levels also.  There is some loss of lordosis.  Plain x-rays again show significant interspace narrowing and degenerative disc disease at L4-5    ASSESSMENT/PLAN:     1. Stenosis of cervical spine with myelopathy  - Ambulatory referral/consult to Physical/Occupational Therapy; Future  - orphenadrine (NORFLEX) 100 mg tablet; Take 1 tablet (100 mg total) by mouth 2 (two) times daily as needed for Muscle spasms.  Dispense: 60 tablet; Refill: 2    2. Lumbar stenosis with neurogenic claudication  - Ambulatory referral/consult to Physical/Occupational Therapy; Future  - orphenadrine (NORFLEX) 100 mg tablet; Take 1 tablet (100 mg total) by mouth 2 (two) times daily as needed for Muscle spasms.  Dispense: 60 tablet; Refill: 2     -Follow up in 3 months         I, Dr. Marcelino Rodriguez, personally performed the services described in this documentation. All medical record entries made by the scribe, Karen Hanks RN, were at my direction and in my presence.  I have reviewed the chart and agree that the record reflects my personal performance and is accurate and complete. Marcelino Rodriguez MD.  11:53 AM 05/01/2023       Marcelino Rodriguez MD FACS [facility]

## 2023-05-07 RX ORDER — GABAPENTIN 300 MG/1
CAPSULE ORAL
Qty: 60 CAPSULE | Refills: 11 | Status: SHIPPED | OUTPATIENT
Start: 2023-05-07

## 2023-06-26 PROBLEM — G45.9 TIA (TRANSIENT ISCHEMIC ATTACK): Status: RESOLVED | Noted: 2023-03-21 | Resolved: 2023-06-26

## 2023-06-27 RX ORDER — TRAMADOL HYDROCHLORIDE 50 MG/1
50 TABLET ORAL EVERY 8 HOURS PRN
Qty: 30 TABLET | Refills: 2 | Status: SHIPPED | OUTPATIENT
Start: 2023-06-27 | End: 2023-08-30

## 2023-07-25 ENCOUNTER — PATIENT MESSAGE (OUTPATIENT)
Dept: ADMINISTRATIVE | Facility: HOSPITAL | Age: 53
End: 2023-07-25
Payer: COMMERCIAL

## 2023-08-03 ENCOUNTER — DOCUMENTATION ONLY (OUTPATIENT)
Dept: ADMINISTRATIVE | Facility: HOSPITAL | Age: 53
End: 2023-08-03
Payer: COMMERCIAL

## 2023-08-04 ENCOUNTER — PATIENT OUTREACH (OUTPATIENT)
Dept: ADMINISTRATIVE | Facility: HOSPITAL | Age: 53
End: 2023-08-04
Payer: COMMERCIAL

## 2023-08-04 ENCOUNTER — DOCUMENTATION ONLY (OUTPATIENT)
Dept: ADMINISTRATIVE | Facility: HOSPITAL | Age: 53
End: 2023-08-04
Payer: COMMERCIAL

## 2023-08-15 ENCOUNTER — OFFICE VISIT (OUTPATIENT)
Dept: NEUROSURGERY | Facility: CLINIC | Age: 53
End: 2023-08-15
Payer: COMMERCIAL

## 2023-08-15 VITALS
DIASTOLIC BLOOD PRESSURE: 86 MMHG | HEART RATE: 64 BPM | RESPIRATION RATE: 16 BRPM | BODY MASS INDEX: 31.42 KG/M2 | HEIGHT: 72 IN | SYSTOLIC BLOOD PRESSURE: 130 MMHG | WEIGHT: 232 LBS

## 2023-08-15 DIAGNOSIS — M48.062 LUMBAR STENOSIS WITH NEUROGENIC CLAUDICATION: ICD-10-CM

## 2023-08-15 DIAGNOSIS — G99.2 STENOSIS OF CERVICAL SPINE WITH MYELOPATHY: Primary | ICD-10-CM

## 2023-08-15 DIAGNOSIS — M48.02 STENOSIS OF CERVICAL SPINE WITH MYELOPATHY: Primary | ICD-10-CM

## 2023-08-15 DIAGNOSIS — R11.0 NAUSEA: ICD-10-CM

## 2023-08-15 PROCEDURE — 4010F PR ACE/ARB THEARPY RXD/TAKEN: ICD-10-PCS | Mod: CPTII,,, | Performed by: PHYSICIAN ASSISTANT

## 2023-08-15 PROCEDURE — 3075F PR MOST RECENT SYSTOLIC BLOOD PRESS GE 130-139MM HG: ICD-10-PCS | Mod: CPTII,,, | Performed by: PHYSICIAN ASSISTANT

## 2023-08-15 PROCEDURE — 99214 PR OFFICE/OUTPT VISIT, EST, LEVL IV, 30-39 MIN: ICD-10-PCS | Mod: ,,, | Performed by: PHYSICIAN ASSISTANT

## 2023-08-15 PROCEDURE — 1159F PR MEDICATION LIST DOCUMENTED IN MEDICAL RECORD: ICD-10-PCS | Mod: CPTII,,, | Performed by: PHYSICIAN ASSISTANT

## 2023-08-15 PROCEDURE — 1160F PR REVIEW ALL MEDS BY PRESCRIBER/CLIN PHARMACIST DOCUMENTED: ICD-10-PCS | Mod: CPTII,,, | Performed by: PHYSICIAN ASSISTANT

## 2023-08-15 PROCEDURE — 1159F MED LIST DOCD IN RCRD: CPT | Mod: CPTII,,, | Performed by: PHYSICIAN ASSISTANT

## 2023-08-15 PROCEDURE — 1160F RVW MEDS BY RX/DR IN RCRD: CPT | Mod: CPTII,,, | Performed by: PHYSICIAN ASSISTANT

## 2023-08-15 PROCEDURE — 3079F PR MOST RECENT DIASTOLIC BLOOD PRESSURE 80-89 MM HG: ICD-10-PCS | Mod: CPTII,,, | Performed by: PHYSICIAN ASSISTANT

## 2023-08-15 PROCEDURE — 3008F PR BODY MASS INDEX (BMI) DOCUMENTED: ICD-10-PCS | Mod: CPTII,,, | Performed by: PHYSICIAN ASSISTANT

## 2023-08-15 PROCEDURE — 99214 OFFICE O/P EST MOD 30 MIN: CPT | Mod: ,,, | Performed by: PHYSICIAN ASSISTANT

## 2023-08-15 PROCEDURE — 3079F DIAST BP 80-89 MM HG: CPT | Mod: CPTII,,, | Performed by: PHYSICIAN ASSISTANT

## 2023-08-15 PROCEDURE — 3008F BODY MASS INDEX DOCD: CPT | Mod: CPTII,,, | Performed by: PHYSICIAN ASSISTANT

## 2023-08-15 PROCEDURE — 3075F SYST BP GE 130 - 139MM HG: CPT | Mod: CPTII,,, | Performed by: PHYSICIAN ASSISTANT

## 2023-08-15 PROCEDURE — 4010F ACE/ARB THERAPY RXD/TAKEN: CPT | Mod: CPTII,,, | Performed by: PHYSICIAN ASSISTANT

## 2023-08-15 RX ORDER — ORPHENADRINE CITRATE 100 MG/1
100 TABLET, EXTENDED RELEASE ORAL 2 TIMES DAILY PRN
Qty: 60 TABLET | Refills: 2 | Status: SHIPPED | OUTPATIENT
Start: 2023-08-15 | End: 2023-12-22 | Stop reason: SDUPTHER

## 2023-08-15 RX ORDER — ONDANSETRON HYDROCHLORIDE 8 MG/1
8 TABLET, FILM COATED ORAL EVERY 12 HOURS PRN
Qty: 20 TABLET | Refills: 0 | Status: SHIPPED | OUTPATIENT
Start: 2023-08-15 | End: 2024-01-22

## 2023-08-15 NOTE — PROGRESS NOTES
[facility]  History & Physical  Neurosurgery      Eb Alfaro   [id number]   1970       SUBJECTIVE:     CHIEF COMPLAINT:  For back pain with prolonged standing      HPI:  Eb Alfaro is a 52 y.o. male who presents for follow up appointment after a course of physical therapy.  His history is significant for having undergone C3 through 5 laminotomies with C2 through C6 instrumented fusion as well as bilateral L4-5 and L5-S1 decompression with right L5-S1 microdiskectomy.  This was performed by Dr. Rodriguez on 03/08/2022.  He was seen last by Dr. Rodriguez on 05/01/2023.  Currently, he is continuing with physical therapy.  They are working on both cervical and lumbar regions.  The patient has seen improvement with therapy.  He does continue with pins and needles type sensation in the right trapezius muscle when he touches the area.  He does not have pain.  Most bothersome for him is that she experiences lower back pain with prolonged standing.  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  This requires a great deal of traveling, walking, and standing.  At times, he has to sit and rest due to lower back pain.    In addition, he complains of intermittent nausea that occurs every 1-2 weeks in the evenings.  He is using Zofran for the nausea.      Past Medical History:   Diagnosis Date    Degeneration, intervertebral disc, cervicothoracic     Herniated nucleus pulposus with myelopathy, cervical     High cholesterol     Hypertension     Kidney stones     Muscle spasms of neck     PONV (postoperative nausea and vomiting)     Spinal stenosis of lumbar region with neurogenic claudication     Stenosis of cervical spine with myelopathy        Past Surgical History:   Procedure Laterality Date    APPENDECTOMY      Bilateral L4-5, L5-S1 decompression; right L5-S1 microdiscectomy  03/08/2022    Dr. Rodriguez    C3-5 laminectomies, C2-C6 posterior instrumented fusion  03/08/2022    Dr. Rodriguez    CHOLECYSTECTOMY       colonocscopy  08/05/2015    Dr Yaakov Gaviria    CYSTOSCOPY W/ URETERAL STENT PLACEMENT Left 12/21/2022    Procedure: CYSTOSCOPY, WITH URETERAL STENT INSERTION;  Surgeon: Ramirez Wei MD;  Location: [facility];  Service: Urology;  Laterality: Left;  LEFT URETERAL STENT PLACEMENT    CYSTOURETEROSCOPY, WITH HOLMIUM LASER LITHOTRIPSY OF URETERAL CALCULUS AND STENT INSERTION Left 02/03/2023    Procedure: CYSTOURETEROSCOPY, WITH HOLMIUM LASER LITHOTRIPSY OF URETERAL CALCULUS AND STENT INSERTION / Stent exchange Left;  Surgeon: Ramirez Wei MD;  Location: [facility];  Service: Urology;  Laterality: Left;    LEFT HEART CATHETERIZATION  10/16/2017    MAGNETIC RESONANCE IMAGING N/A 03/22/2023    Procedure: MRI (Magnetic Resonance Imagine);  Surgeon: Yash Murphy MD;  Location: [facility];  Service: Medicine;  Laterality: N/A;  MRI LUMBAR SPINE W/O CONTRAST / MRI BRAIN W/O CONTRAST WITH ANESTHESIA    PARATHYROIDECTOMY N/A 05/06/2022    Procedure: PARATHYROIDECTOMY;  Surgeon: Ajay Blum MD;  Location: [facility];  Service: ENT;  Laterality: N/A;  PARATHYROIDECTOMY // NERVE MONITOR // FROZEN SECTION // INTRA OP PTH // SUPINE       Family History   Problem Relation Age of Onset    Cancer Mother         meningiomas    Liver cancer Mother     Cancer Father         colon    Liver disease Sister        Social History     Socioeconomic History    Marital status:    Tobacco Use    Smoking status: Former     Current packs/day: 0.00    Smokeless tobacco: Never   Substance and Sexual Activity    Alcohol use: Yes     Alcohol/week: 2.0 standard drinks of alcohol     Types: 2 Standard drinks or equivalent per week     Comment: SOCIALLY    Drug use: No       Review of patient's allergies indicates:   Allergen Reactions    Cefdinir Anaphylaxis    Penicillins Anaphylaxis    Hydrocodone-acetaminophen Itching    Morphine Hives    Opioids - morphine analogues Hives        Current Outpatient Medications   Medication  Instructions    gabapentin (NEURONTIN) 300 MG capsule TAKE 1 CAPSULE(300 MG) BY MOUTH TWICE DAILY    ibuprofen (ADVIL,MOTRIN) 200 mg, Oral, Every 6 hours PRN    olmesartan (BENICAR) 5 MG Tab TAKE 1 TABLET(5 MG) BY MOUTH TWICE DAILY    ondansetron (ZOFRAN) 8 mg, Oral, As needed (PRN)    orphenadrine (NORFLEX) 100 mg, Oral, 2 times daily PRN    rosuvastatin (CRESTOR) 10 mg, Oral, Nightly    traMADoL (ULTRAM) 50 mg, Oral, Every 8 hours PRN        ROS:    Review of Systems   Constitutional:  Negative for chills and fever.   HENT:  Negative for nosebleeds and sore throat.    Eyes:  Negative for pain and visual disturbance.   Respiratory:  Negative for cough, chest tightness and shortness of breath.    Cardiovascular:  Negative for chest pain.   Gastrointestinal:  Positive for nausea. Negative for diarrhea and vomiting.   Genitourinary:  Negative for difficulty urinating, dysuria and hematuria.   Musculoskeletal:  Positive for back pain. Negative for gait problem and myalgias.   Skin:  Negative for rash.   Neurological:  Positive for numbness. Negative for dizziness, facial asymmetry and headaches.   Psychiatric/Behavioral:  Negative for confusion and sleep disturbance. The patient is not nervous/anxious.        OBJECTIVE:       Visit Vitals  /86   Pulse 64   Resp 16   Ht 6' (1.829 m)   Wt 105.2 kg (232 lb)   BMI 31.46 kg/m²        General:  Pleasant, Well-nourished, Well-groomed.    Lungs:  Breathing is quiet with non-labored respirations.    Neurological:  The patient is awake, alert, and oriented in all 4 spheres.  His memory, cognition, and affect are appropriate.  Speech is fluent.  He is moving all extremities well.  Gait is normal.  Coordination:  Within normal limits      Imaging:  All pertinent neuroimaging independently reviewed. Discussed these findings in detail with the patient.    ASSESSMENT:     1. Stenosis of cervical spine with myelopathy  orphenadrine (NORFLEX) 100 mg tablet      2. Lumbar stenosis  with neurogenic claudication  orphenadrine (NORFLEX) 100 mg tablet      3. Nausea  ondansetron (ZOFRAN) 8 MG tablet         PLAN:     The patient has seen improvement in his symptoms with physical therapy.  He will continue with this current course of therapy.  He was provided with a handout regarding lumbar core strengthening.  We discussed the importance of core strengthening as well as proper body mechanics.  He voiced understanding.  He was provided with a prescription for Norflex as well as Zofran.  I have recommended that he start Nexium.  There is a chance he has reflux which could be causing the nausea.  I asked that he would discuss this with Dr. Nick Monzon, his primary care physician.  He will return for follow-up on an as-needed basis.      A total of 14 minutes was spent face-to-face with the patient during this encounter.  Over half of that time was spent on counseling and coordination of care.  An additional 10+ minutes were used to review the patient's chart, including physical therapy notes, lumbar MRI and cervical x-ray images, and work on office note.      Ashely Carrillo PA-C      Disclaimer:  This note is prepared using voice recognition software and as such is likely to have errors despite attempts at proofreading.

## 2023-08-28 ENCOUNTER — TELEPHONE (OUTPATIENT)
Dept: PRIMARY CARE CLINIC | Facility: CLINIC | Age: 53
End: 2023-08-28
Payer: COMMERCIAL

## 2023-08-28 NOTE — TELEPHONE ENCOUNTER
Patient reports he is having pain /discomfort in both ears.   Started Thursday.  Thinks it is began of ear infection.  Requesting antibiotics.

## 2023-08-28 NOTE — TELEPHONE ENCOUNTER
----- Message from So Pereira sent at 8/28/2023  8:07 AM CDT -----  Regarding: call back  .Type:  Needs Medical Advice    Who Called: pt  Symptoms (please be specific): fluid in ear   How long has patient had these symptoms:  4 days  Pharmacy name and phone #:    Would the patient rather a call back or a response via [facility]? Call back  Best Call Back Number: [phone]  Additional Information: pt is requesting a call back

## 2023-08-30 ENCOUNTER — OFFICE VISIT (OUTPATIENT)
Dept: URGENT CARE | Facility: CLINIC | Age: 53
End: 2023-08-30
Payer: COMMERCIAL

## 2023-08-30 VITALS
OXYGEN SATURATION: 97 % | HEART RATE: 77 BPM | DIASTOLIC BLOOD PRESSURE: 92 MMHG | WEIGHT: 232 LBS | SYSTOLIC BLOOD PRESSURE: 153 MMHG | TEMPERATURE: 98 F | RESPIRATION RATE: 16 BRPM | BODY MASS INDEX: 31.42 KG/M2 | HEIGHT: 72 IN

## 2023-08-30 DIAGNOSIS — H65.92 LEFT NON-SUPPURATIVE OTITIS MEDIA: ICD-10-CM

## 2023-08-30 PROCEDURE — 96372 PR INJECTION,THERAP/PROPH/DIAG2ST, IM OR SUBCUT: ICD-10-PCS | Mod: ,,, | Performed by: NURSE PRACTITIONER

## 2023-08-30 PROCEDURE — 99213 OFFICE O/P EST LOW 20 MIN: CPT | Mod: 25,,, | Performed by: NURSE PRACTITIONER

## 2023-08-30 PROCEDURE — 96372 THER/PROPH/DIAG INJ SC/IM: CPT | Mod: ,,, | Performed by: NURSE PRACTITIONER

## 2023-08-30 PROCEDURE — 99213 PR OFFICE/OUTPT VISIT, EST, LEVL III, 20-29 MIN: ICD-10-PCS | Mod: 25,,, | Performed by: NURSE PRACTITIONER

## 2023-08-30 RX ORDER — AZITHROMYCIN 250 MG/1
TABLET, FILM COATED ORAL
Qty: 6 TABLET | Refills: 0 | Status: SHIPPED | OUTPATIENT
Start: 2023-08-30 | End: 2024-01-22

## 2023-08-30 RX ORDER — BETAMETHASONE SODIUM PHOSPHATE AND BETAMETHASONE ACETATE 3; 3 MG/ML; MG/ML
12 INJECTION, SUSPENSION INTRA-ARTICULAR; INTRALESIONAL; INTRAMUSCULAR; SOFT TISSUE
Status: COMPLETED | OUTPATIENT
Start: 2023-08-30 | End: 2023-08-30

## 2023-08-30 RX ADMIN — BETAMETHASONE SODIUM PHOSPHATE AND BETAMETHASONE ACETATE 12 MG: 3; 3 INJECTION, SUSPENSION INTRA-ARTICULAR; INTRALESIONAL; INTRAMUSCULAR; SOFT TISSUE at 06:08

## 2023-08-30 NOTE — PATIENT INSTRUCTIONS
Take antibiotic to completion.  Azithromycin  Ibuprofen OTC as needed for pain/fever.  Please follow up with your primary care provider within 2-5 days if your signs and symptoms have not resolved or worsen.

## 2023-08-30 NOTE — LETTER
August 30, 2023      [facility]  [address], [address]  [place] [address]  Phone: [phone]  Fax: [phone]       Patient: Eb Alfaro   YOB: 1970  Date of Visit: 08/30/2023    To Whom It May Concern:    Cleo Alfaro  was at [facility] on 08/30/2023. The patient may return to work/school on 09/01/2023 with no restrictions. If you have any questions or concerns, or if I can be of further assistance, please do not hesitate to contact me.    Sincerely,    Shmuel Thompson MA      Normal rate, regular rhythm.  Heart sounds S1, S2.  No murmurs, rubs or gallops.

## 2023-08-30 NOTE — PROGRESS NOTES
Subjective:      Patient ID: Eb Alfaro is a 52 y.o. male.    Vitals:  height is 6' (1.829 m) and weight is 105.2 kg (232 lb). His temperature is 97.7 °F (36.5 °C). His blood pressure is 153/92 (abnormal) and his pulse is 77. His respiration is 16 and oxygen saturation is 97%.     Chief Complaint: Ear Problem (Since last week bilateral ear pressure and fluid in ear. Last night left ear pain and noticed raised area behind ear. Tried taking advil.)    52-year-old male presents with left ear pain.  Onset several days ago.  No recent injury or trauma.  Minimal relief with ibuprofen      HENT:  Positive for ear pain (left).       Objective:     Physical Exam   Constitutional: He is oriented to person, place, and time. He appears well-developed. He is cooperative.  Non-toxic appearance. He does not appear ill. No distress.   HENT:   Head: Normocephalic and atraumatic.   Ears:   Right Ear: Hearing, tympanic membrane, external ear and ear canal normal.   Left Ear: Hearing and external ear normal. There is tenderness. Tympanic membrane is erythematous and bulging.   Nose: Nose normal. No mucosal edema, rhinorrhea or nasal deformity. No epistaxis. Right sinus exhibits no maxillary sinus tenderness and no frontal sinus tenderness. Left sinus exhibits no maxillary sinus tenderness and no frontal sinus tenderness.   Mouth/Throat: Uvula is midline, oropharynx is clear and moist and mucous membranes are normal. No trismus in the jaw. Normal dentition. No uvula swelling. No oropharyngeal exudate, posterior oropharyngeal edema or posterior oropharyngeal erythema.   Eyes: Conjunctivae and lids are normal. No scleral icterus.   Neck: Trachea normal and phonation normal. Neck supple. No edema present. No erythema present. No neck rigidity present.   Cardiovascular: Normal rate, regular rhythm, normal heart sounds and normal pulses.   Pulmonary/Chest: Effort normal and breath sounds normal. No respiratory distress. He has no  decreased breath sounds. He has no rhonchi.   Abdominal: Normal appearance.   Musculoskeletal: Normal range of motion.         General: No deformity. Normal range of motion.   Neurological: He is alert and oriented to person, place, and time. He exhibits normal muscle tone. Coordination normal.   Skin: Skin is warm, dry, intact, not diaphoretic and not pale.   Psychiatric: His speech is normal and behavior is normal. Judgment and thought content normal.   Nursing note and vitals reviewed.      Assessment:     1. Left non-suppurative otitis media        Plan:   Take antibiotic to completion.  Azithromycin  Ibuprofen OTC as needed for pain/fever.  Please follow up with your primary care provider within 2-5 days if your signs and symptoms have not resolved or worsen.      Left non-suppurative otitis media  -     betamethasone acetate-betamethasone sodium phosphate injection 12 mg  -     azithromycin (Z-TERESA) 250 MG tablet; Take 2 tablets by mouth on day 1; Take 1 tablet by mouth on days 2-5  Dispense: 6 tablet; Refill: 0

## 2023-12-22 ENCOUNTER — PATIENT MESSAGE (OUTPATIENT)
Dept: NEUROSURGERY | Facility: CLINIC | Age: 53
End: 2023-12-22
Payer: COMMERCIAL

## 2023-12-22 DIAGNOSIS — M48.02 STENOSIS OF CERVICAL SPINE WITH MYELOPATHY: ICD-10-CM

## 2023-12-22 DIAGNOSIS — M48.062 LUMBAR STENOSIS WITH NEUROGENIC CLAUDICATION: ICD-10-CM

## 2023-12-22 DIAGNOSIS — G99.2 STENOSIS OF CERVICAL SPINE WITH MYELOPATHY: ICD-10-CM

## 2023-12-22 RX ORDER — ORPHENADRINE CITRATE 100 MG/1
100 TABLET, EXTENDED RELEASE ORAL 2 TIMES DAILY PRN
Qty: 60 TABLET | Refills: 2 | Status: SHIPPED | OUTPATIENT
Start: 2023-12-22 | End: 2024-04-01

## 2023-12-26 DIAGNOSIS — E78.00 HYPERCHOLESTEROLEMIA: ICD-10-CM

## 2023-12-26 RX ORDER — ROSUVASTATIN CALCIUM 10 MG/1
10 TABLET, COATED ORAL NIGHTLY
Qty: 90 TABLET | Refills: 3 | Status: SHIPPED | OUTPATIENT
Start: 2023-12-26

## 2023-12-30 ENCOUNTER — OFFICE VISIT (OUTPATIENT)
Dept: URGENT CARE | Facility: CLINIC | Age: 53
End: 2023-12-30
Payer: COMMERCIAL

## 2023-12-30 VITALS
BODY MASS INDEX: 31.83 KG/M2 | HEIGHT: 72 IN | DIASTOLIC BLOOD PRESSURE: 87 MMHG | SYSTOLIC BLOOD PRESSURE: 146 MMHG | OXYGEN SATURATION: 99 % | HEART RATE: 82 BPM | TEMPERATURE: 99 F | WEIGHT: 235 LBS | RESPIRATION RATE: 18 BRPM

## 2023-12-30 DIAGNOSIS — R05.1 ACUTE COUGH: ICD-10-CM

## 2023-12-30 DIAGNOSIS — J01.40 ACUTE NON-RECURRENT PANSINUSITIS: Primary | ICD-10-CM

## 2023-12-30 DIAGNOSIS — J02.9 SORE THROAT: ICD-10-CM

## 2023-12-30 DIAGNOSIS — R50.9 FEVER, UNSPECIFIED FEVER CAUSE: ICD-10-CM

## 2023-12-30 LAB
CTP QC/QA: YES
MOLECULAR STREP A: NEGATIVE
POC MOLECULAR INFLUENZA A AGN: NEGATIVE
POC MOLECULAR INFLUENZA B AGN: NEGATIVE
SARS-COV-2 RDRP RESP QL NAA+PROBE: NEGATIVE

## 2023-12-30 PROCEDURE — 87502 POCT INFLUENZA A/B MOLECULAR: ICD-10-PCS | Mod: QW,,, | Performed by: FAMILY MEDICINE

## 2023-12-30 PROCEDURE — 87635: ICD-10-PCS | Mod: QW,,, | Performed by: FAMILY MEDICINE

## 2023-12-30 PROCEDURE — 99214 OFFICE O/P EST MOD 30 MIN: CPT | Mod: 25,,, | Performed by: FAMILY MEDICINE

## 2023-12-30 PROCEDURE — 87651 POCT STREP A MOLECULAR: ICD-10-PCS | Mod: QW,,, | Performed by: FAMILY MEDICINE

## 2023-12-30 PROCEDURE — 99214 PR OFFICE/OUTPT VISIT, EST, LEVL IV, 30-39 MIN: ICD-10-PCS | Mod: 25,,, | Performed by: FAMILY MEDICINE

## 2023-12-30 PROCEDURE — 96372 THER/PROPH/DIAG INJ SC/IM: CPT | Mod: ,,, | Performed by: FAMILY MEDICINE

## 2023-12-30 PROCEDURE — 96372 PR INJECTION,THERAP/PROPH/DIAG2ST, IM OR SUBCUT: ICD-10-PCS | Mod: ,,, | Performed by: FAMILY MEDICINE

## 2023-12-30 PROCEDURE — 87502 INFLUENZA DNA AMP PROBE: CPT | Mod: QW,,, | Performed by: FAMILY MEDICINE

## 2023-12-30 PROCEDURE — 87651 STREP A DNA AMP PROBE: CPT | Mod: QW,,, | Performed by: FAMILY MEDICINE

## 2023-12-30 PROCEDURE — 87635 SARS-COV-2 COVID-19 AMP PRB: CPT | Mod: QW,,, | Performed by: FAMILY MEDICINE

## 2023-12-30 RX ORDER — DEXAMETHASONE SODIUM PHOSPHATE 100 MG/10ML
10 INJECTION INTRAMUSCULAR; INTRAVENOUS
Status: COMPLETED | OUTPATIENT
Start: 2023-12-30 | End: 2023-12-30

## 2023-12-30 RX ORDER — CODEINE PHOSPHATE AND GUAIFENESIN 10; 100 MG/5ML; MG/5ML
5 SOLUTION ORAL 3 TIMES DAILY PRN
Qty: 118 ML | Refills: 0 | Status: SHIPPED | OUTPATIENT
Start: 2023-12-30 | End: 2024-01-09

## 2023-12-30 RX ORDER — AZITHROMYCIN 250 MG/1
TABLET, FILM COATED ORAL
Qty: 6 TABLET | Refills: 0 | Status: SHIPPED | OUTPATIENT
Start: 2023-12-30 | End: 2024-01-04

## 2023-12-30 RX ADMIN — DEXAMETHASONE SODIUM PHOSPHATE 10 MG: 100 INJECTION INTRAMUSCULAR; INTRAVENOUS at 12:12

## 2023-12-30 NOTE — PROGRESS NOTES
Patient ID: Eb Alfaro is a 53 y.o. male.  Chief Complaint: Sore Throat (Sore throat, cough x one day, hurts to swallow, fever of 101 last night, chills.)    HPI:   Patient presents here today for above reason.     The patient presents today with symptoms of cough, cold, and congestion. Duration of symptoms is 1-2 days . Associated symptoms include sore throat, nasal congestion, rhinorrhea, sinus pressure, and + dry cough. Prior treatment over-the-counter has been with OTC cough and cold meds. The patient reports + fever and chills. The patient reports + sick contacts.     The patient's Health Maintenance was reviewed and the following appears to be due at this time:   Health Maintenance Due   Topic Date Due    Hepatitis C Screening  Never done    COVID-19 Vaccine (1) Never done    HIV Screening  Never done    Shingles Vaccine (1 of 2) Never done    Influenza Vaccine (1) Never done     Past Medical History:  Past Medical History:   Diagnosis Date    Degeneration, intervertebral disc, cervicothoracic     Herniated nucleus pulposus with myelopathy, cervical     High cholesterol     Hypertension     Kidney stones     Muscle spasms of neck     PONV (postoperative nausea and vomiting)     Spinal stenosis of lumbar region with neurogenic claudication     Stenosis of cervical spine with myelopathy      Past Surgical History:   Procedure Laterality Date    APPENDECTOMY      Bilateral L4-5, L5-S1 decompression; right L5-S1 microdiscectomy  03/08/2022    Dr. Rodriguez    C3-5 laminectomies, C2-C6 posterior instrumented fusion  03/08/2022    Dr. Rodriguez    CHOLECYSTECTOMY      colonocscopy  08/05/2015    Dr Yaakov Gaviria    CYSTOSCOPY W/ URETERAL STENT PLACEMENT Left 12/21/2022    Procedure: CYSTOSCOPY, WITH URETERAL STENT INSERTION;  Surgeon: Ramirez Wei MD;  Location: [facility];  Service: Urology;  Laterality: Left;  LEFT URETERAL STENT PLACEMENT    CYSTOURETEROSCOPY, WITH HOLMIUM LASER LITHOTRIPSY OF URETERAL  CALCULUS AND STENT INSERTION Left 02/03/2023    Procedure: CYSTOURETEROSCOPY, WITH HOLMIUM LASER LITHOTRIPSY OF URETERAL CALCULUS AND STENT INSERTION / Stent exchange Left;  Surgeon: Ramirez Wei MD;  Location: [facility];  Service: Urology;  Laterality: Left;    LEFT HEART CATHETERIZATION  10/16/2017    MAGNETIC RESONANCE IMAGING N/A 03/22/2023    Procedure: MRI (Magnetic Resonance Imagine);  Surgeon: Yash Murphy MD;  Location: [facility];  Service: Medicine;  Laterality: N/A;  MRI LUMBAR SPINE W/O CONTRAST / MRI BRAIN W/O CONTRAST WITH ANESTHESIA    PARATHYROIDECTOMY N/A 05/06/2022    Procedure: PARATHYROIDECTOMY;  Surgeon: Ajay Blum MD;  Location: [facility] OR;  Service: ENT;  Laterality: N/A;  PARATHYROIDECTOMY // NERVE MONITOR // FROZEN SECTION // INTRA OP PTH // SUPINE     Review of patient's allergies indicates:   Allergen Reactions    Cefdinir Anaphylaxis    Penicillins Anaphylaxis    Hydrocodone-acetaminophen Itching    Morphine Hives    Opioids - morphine analogues Hives     Current Outpatient Medications on File Prior to Visit   Medication Sig Dispense Refill    gabapentin (NEURONTIN) 300 MG capsule TAKE 1 CAPSULE(300 MG) BY MOUTH TWICE DAILY 60 capsule 11    ibuprofen (ADVIL,MOTRIN) 200 MG tablet Take 200 mg by mouth every 6 (six) hours as needed for Pain.      olmesartan (BENICAR) 5 MG Tab TAKE 1 TABLET(5 MG) BY MOUTH TWICE DAILY 180 tablet 3    orphenadrine (NORFLEX) 100 mg tablet Take 1 tablet (100 mg total) by mouth 2 (two) times daily as needed for Muscle spasms. 60 tablet 2    rosuvastatin (CRESTOR) 10 MG tablet TAKE 1 TABLET(10 MG) BY MOUTH EVERY EVENING 90 tablet 3    azithromycin (Z-TERESA) 250 MG tablet Take 2 tablets by mouth on day 1; Take 1 tablet by mouth on days 2-5 (Patient not taking: Reported on 12/30/2023) 6 tablet 0    ondansetron (ZOFRAN) 8 MG tablet Take 1 tablet (8 mg total) by mouth every 12 (twelve) hours as needed for Nausea. (Patient not taking: Reported on  12/30/2023) 20 tablet 0     No current facility-administered medications on file prior to visit.     Social History     Socioeconomic History    Marital status:    Tobacco Use    Smoking status: Former     Types: Cigarettes    Smokeless tobacco: Never   Substance and Sexual Activity    Alcohol use: Yes     Alcohol/week: 2.0 standard drinks of alcohol     Types: 2 Standard drinks or equivalent per week     Comment: SOCIALLY    Drug use: No     Family History   Problem Relation Age of Onset    Cancer Mother         meningiomas    Liver cancer Mother     Cancer Father         colon    Liver disease Sister      ROS:   Review of Systems   Constitutional:  Positive for activity change, appetite change and fatigue. Negative for chills, diaphoresis, fever and unexpected weight change.   HENT:  Positive for congestion, rhinorrhea, sore throat, trouble swallowing and voice change.    Respiratory:  Positive for cough. Negative for apnea, choking, chest tightness and shortness of breath.    Cardiovascular: Negative.    Gastrointestinal: Negative.    Genitourinary: Negative.    Musculoskeletal: Negative.    Hematological: Negative.    Psychiatric/Behavioral: Negative.         Vitals/PE:   BP (!) 146/87 (BP Location: Left arm, Patient Position: Sitting, BP Method: Large (Manual))   Pulse 82   Temp 98.5 °F (36.9 °C) (Oral)   Resp 18   Ht 6' (1.829 m)   Wt 106.6 kg (235 lb)   SpO2 99%   BMI 31.87 kg/m²   Physical Exam  Vitals and nursing note reviewed.   Constitutional:       Appearance: He is ill-appearing. He is not toxic-appearing or diaphoretic.   HENT:      Head: Normocephalic.      Nose: Mucosal edema and congestion present.      Right Turbinates: Enlarged and swollen.      Left Turbinates: Enlarged and swollen.      Right Sinus: Maxillary sinus tenderness and frontal sinus tenderness present.      Left Sinus: Maxillary sinus tenderness and frontal sinus tenderness present.      Mouth/Throat:      Pharynx:  Posterior oropharyngeal erythema present.   Eyes:      Pupils: Pupils are equal, round, and reactive to light.   Cardiovascular:      Rate and Rhythm: Normal rate.      Pulses: Normal pulses.   Pulmonary:      Effort: Pulmonary effort is normal.   Abdominal:      General: Abdomen is flat.   Musculoskeletal:         General: Normal range of motion.   Skin:     General: Skin is warm.      Capillary Refill: Capillary refill takes less than 2 seconds.   Neurological:      General: No focal deficit present.      Mental Status: He is alert and oriented to person, place, and time.   Psychiatric:         Mood and Affect: Mood normal.         Behavior: Behavior normal.         Assessment/Plan:   Acute non-recurrent pansinusitis  -     dexAMETHasone injection 10 mg  -     azithromycin (Z-TERESA) 250 MG tablet; Take 2 tablets by mouth on day 1; Take 1 tablet by mouth on days 2-5  Dispense: 6 tablet; Refill: 0  Testing negative. Treatment as above and below.  Fever, unspecified fever cause  -     POCT Influenza A/B Molecular  -     POCT Strep A, Molecular  -     POCT COVID-19 Rapid Screening  All testing negative.   Sore throat  -     POCT Influenza A/B Molecular  -     POCT Strep A, Molecular  -     POCT COVID-19 Rapid Screening  As above.      Orders Placed This Encounter   Procedures    POCT Influenza A/B Molecular    POCT Strep A, Molecular    POCT COVID-19 Rapid Screening       Education and counseling done face to face regarding medical conditions and plan. Contact office if new symptoms develop. Should any symptoms ever significantly worsen seek emergency medical attention/go to ER. Follow up at least yearly for wellness or sooner PRN. Nurse to call patient with any results. The patient is receptive, expresses understanding and is agreeable to plan. All questions have been answered.  Follow up if symptoms worsen or fail to improve.

## 2023-12-30 NOTE — PATIENT INSTRUCTIONS
Sinusitis in Adults   The Basics   Written by the doctors and editors at [facility]   What is sinusitis? -- Sinusitis is a condition that can cause a stuffy nose, pain in the face, and discharge (mucus) from the nose. The sinuses are hollow areas in the bones of the face (figure 1). They have a thin lining that normally makes a small amount of mucus. When this lining gets irritated or infected, it swells and makes extra mucus. This causes symptoms.  Sinusitis can occur when a person gets sick with a cold. The germs causing the cold can also infect the sinuses. Many times, a person feels like their cold is getting better. But then they get sinusitis and begin to feel sick again.  What are the symptoms of sinusitis? -- Common symptoms of sinusitis include:  Stuffy or blocked nose  Thick white, yellow, or green discharge from the nose  Pain in the teeth  Pain or pressure in the face - This often feels worse when a person bends forward.  People with sinusitis can also have other symptoms that include:  Fever  Cough  Trouble smelling  Ear pressure or fullness  Headache  Bad breath  Feeling tired  Most of the time, symptoms start to improve in 7 to 10 days.  Should I see a doctor or nurse? -- See your doctor or nurse if your symptoms last more than 10 days, or if your symptoms first get better but then get worse.  Rarely, sinusitis can lead to serious problems. See your doctor or nurse right away (do not wait 10 days) if you have:  Fever higher than 102°F (38.9°C)  Sudden and severe pain in the face and head  Trouble seeing or seeing double  Trouble thinking clearly  Swelling or redness around one or both eyes  A stiff neck  Is there anything I can do on my own to feel better? -- Yes. To reduce your symptoms, you can:  Take an over-the-counter pain reliever to reduce the pain  Rinse your nose and sinuses with salt water a few times a day - Ask your doctor or nurse about the best way to do this.  Your doctor might also  "prescribe a steroid nose spray to reduce the swelling in your nose. (These kinds of steroid nose sprays are safe to take, and do not contain the same steroids that some athletes take illegally.)  How is sinusitis treated? -- Most of the time, sinusitis does not need to be treated with antibiotic medicines. This is because most sinusitis is caused by viruses, not bacteria, and antibiotics do not kill viruses. In fact, even sinusitis caused by bacteria will usually get better on its own without antibiotics.   Some people with sinusitis do need treatment with antibiotics. If your symptoms have not improved after 10 days, ask your doctor if you should take antibiotics. Your doctor might recommend that you wait 1 more week to see if your symptoms improve. But if you have symptoms such as a fever or a lot of pain, they might prescribe antibiotics. It is important to follow your doctor's instructions about taking your antibiotics.  What if my symptoms do not get better? -- If your symptoms do not get better, talk with your doctor or nurse. They might order tests to figure out why you still have symptoms. These can include:  CT scan or other imaging tests - Imaging tests create pictures of the inside of the body.  A test to look inside the sinuses - For this test, a doctor puts a thin tube with a camera on the end into the nose and up into the sinuses.  Some people get a lot of sinus infections or have symptoms that last at least 3 months. These people can have a different type of sinusitis called "chronic sinusitis." Chronic sinusitis can be caused by different things. For example, some people have growths inside their nose or sinuses that are called "polyps." Other people have allergies that cause their symptoms.  Chronic sinusitis can be treated in different ways. If you have chronic sinusitis, talk with your doctor about which treatments are right for you.  All topics are updated as new evidence becomes available and " our peer review process is complete.  This topic retrieved from [employer] on: Sep 21, 2021.  Topic 65699 Version 17.0  Release: 29.4.2 - C29.263  © 2021 [employer]Date, Inc[employer] and/or its affiliates. All rights reserved.  figure 1: Sinuses of the face     This drawing shows the sinuses of the face, from the side and front views.  Graphic 205020 Version 1.0     Consumer Information Use and Disclaimer

## 2024-01-19 ENCOUNTER — NURSE TRIAGE (OUTPATIENT)
Dept: ADMINISTRATIVE | Facility: CLINIC | Age: 54
End: 2024-01-19
Payer: COMMERCIAL

## 2024-01-19 DIAGNOSIS — I10 PRIMARY HYPERTENSION: ICD-10-CM

## 2024-01-19 NOTE — TELEPHONE ENCOUNTER
[place]    PCP:  Dr. Nick Monzon    He states that he ran out of BP meds last night.  Olmesartan 5 mg po BID.  He needs med sent to [facility] at [address] [place] [address].  Denies any symptoms at this time.  Per protocol, care advised is discuss with PCP and callback by Nurse within 1 hr.  Advised pt if no callback from PCP within timeframe of dispo then he will need to be seen by either OD VV/UCC/ED.  Pt VU.  Advised to call for worsening/questions/concerns.  VU.    Reason for Disposition   Prescription refill request for ESSENTIAL medicine (i.e., likelihood of harm to patient if not taken) and triager unable to refill per department policy    Protocols used: Medication Refill and Renewal Call-A-OH

## 2024-01-22 ENCOUNTER — OFFICE VISIT (OUTPATIENT)
Dept: PRIMARY CARE CLINIC | Facility: CLINIC | Age: 54
End: 2024-01-22
Payer: COMMERCIAL

## 2024-01-22 VITALS
OXYGEN SATURATION: 98 % | HEIGHT: 72 IN | RESPIRATION RATE: 18 BRPM | DIASTOLIC BLOOD PRESSURE: 88 MMHG | WEIGHT: 235 LBS | BODY MASS INDEX: 31.83 KG/M2 | SYSTOLIC BLOOD PRESSURE: 137 MMHG | TEMPERATURE: 98 F | HEART RATE: 74 BPM

## 2024-01-22 DIAGNOSIS — F17.210 SMOKING GREATER THAN 20 PACK YEARS: ICD-10-CM

## 2024-01-22 DIAGNOSIS — M79.651 PAIN IN BOTH THIGHS: ICD-10-CM

## 2024-01-22 DIAGNOSIS — H66.92 LEFT OTITIS MEDIA, UNSPECIFIED OTITIS MEDIA TYPE: ICD-10-CM

## 2024-01-22 DIAGNOSIS — M79.652 PAIN IN BOTH THIGHS: ICD-10-CM

## 2024-01-22 DIAGNOSIS — Z12.11 SCREEN FOR COLON CANCER: ICD-10-CM

## 2024-01-22 DIAGNOSIS — R09.81 SINUS CONGESTION: Primary | ICD-10-CM

## 2024-01-22 DIAGNOSIS — I10 PRIMARY HYPERTENSION: ICD-10-CM

## 2024-01-22 DIAGNOSIS — Z87.891 FORMER SMOKER: ICD-10-CM

## 2024-01-22 PROBLEM — R55 NEAR SYNCOPE: Status: RESOLVED | Noted: 2023-03-21 | Resolved: 2024-01-22

## 2024-01-22 PROBLEM — S05.02XA CORNEAL ABRASION, LEFT, INITIAL ENCOUNTER: Status: RESOLVED | Noted: 2023-03-24 | Resolved: 2024-01-22

## 2024-01-22 PROBLEM — T15.92XA FOREIGN BODY, EYE, LEFT, INITIAL ENCOUNTER: Status: RESOLVED | Noted: 2023-03-24 | Resolved: 2024-01-22

## 2024-01-22 PROCEDURE — 1160F RVW MEDS BY RX/DR IN RCRD: CPT | Mod: CPTII,,, | Performed by: STUDENT IN AN ORGANIZED HEALTH CARE EDUCATION/TRAINING PROGRAM

## 2024-01-22 PROCEDURE — 3079F DIAST BP 80-89 MM HG: CPT | Mod: CPTII,,, | Performed by: STUDENT IN AN ORGANIZED HEALTH CARE EDUCATION/TRAINING PROGRAM

## 2024-01-22 PROCEDURE — 1159F MED LIST DOCD IN RCRD: CPT | Mod: CPTII,,, | Performed by: STUDENT IN AN ORGANIZED HEALTH CARE EDUCATION/TRAINING PROGRAM

## 2024-01-22 PROCEDURE — 3075F SYST BP GE 130 - 139MM HG: CPT | Mod: CPTII,,, | Performed by: STUDENT IN AN ORGANIZED HEALTH CARE EDUCATION/TRAINING PROGRAM

## 2024-01-22 PROCEDURE — 99214 OFFICE O/P EST MOD 30 MIN: CPT | Mod: ,,, | Performed by: STUDENT IN AN ORGANIZED HEALTH CARE EDUCATION/TRAINING PROGRAM

## 2024-01-22 PROCEDURE — 4010F ACE/ARB THERAPY RXD/TAKEN: CPT | Mod: CPTII,,, | Performed by: STUDENT IN AN ORGANIZED HEALTH CARE EDUCATION/TRAINING PROGRAM

## 2024-01-22 PROCEDURE — 3008F BODY MASS INDEX DOCD: CPT | Mod: CPTII,,, | Performed by: STUDENT IN AN ORGANIZED HEALTH CARE EDUCATION/TRAINING PROGRAM

## 2024-01-22 RX ORDER — OLMESARTAN MEDOXOMIL 5 MG/1
5 TABLET ORAL 2 TIMES DAILY
Qty: 180 TABLET | Refills: 3 | Status: SHIPPED | OUTPATIENT
Start: 2024-01-22 | End: 2025-01-16

## 2024-01-22 RX ORDER — OLMESARTAN MEDOXOMIL 5 MG/1
TABLET ORAL
Qty: 60 TABLET | Refills: 1 | Status: SHIPPED | OUTPATIENT
Start: 2024-01-22 | End: 2024-01-22 | Stop reason: SDUPTHER

## 2024-01-22 RX ORDER — DOXYCYCLINE HYCLATE 100 MG
100 TABLET ORAL 2 TIMES DAILY
Qty: 14 TABLET | Refills: 0 | Status: SHIPPED | OUTPATIENT
Start: 2024-01-22

## 2024-01-22 RX ORDER — FLUTICASONE PROPIONATE 50 MCG
1 SPRAY, SUSPENSION (ML) NASAL 2 TIMES DAILY
Qty: 15.8 ML | Refills: 11 | Status: SHIPPED | OUTPATIENT
Start: 2024-01-22

## 2024-01-22 NOTE — ASSESSMENT & PLAN NOTE
Symptoms consistent with meralgia paresthetica although atypical that it worsens w/standing and improves w/sitting. Still counseled on loose clothing at waist, stretches, weight loss, etc. Also recommended he discuss with Neurosurgery given complex spine history

## 2024-01-22 NOTE — PROGRESS NOTES
Subjective:       Patient ID: Eb Alfaro is a 53 y.o. male.    ----------------------------  Degeneration, intervertebral disc, cervicothoracic  Herniated nucleus pulposus with myelopathy, cervical  High cholesterol  Hypertension  Kidney stones  Muscle spasms of neck  PONV (postoperative nausea and vomiting)  Spinal stenosis of lumbar region with neurogenic claudication  Stenosis of cervical spine with myelopathy     Chief Complaint: Ear Fullness (Left ear)    C/o ear pressure and sensation of fluid behind ear. Had gone to urgent care about 3 weeks ago for URI symptoms, given azithromycin and steroid w/some improvement.     C/o burning sensation in lateral aspect of B/L thighs, only occurs with prolonged standing, relieved by sitting, present off/on for 2 weeks, h/o spine surgery      Review of Systems   Constitutional:  Negative for chills and fever.   HENT:  Positive for ear pain, sinus pressure/congestion and sore throat.    Respiratory:  Negative for cough, shortness of breath and wheezing.    Cardiovascular:  Negative for chest pain and leg swelling.   Gastrointestinal:  Negative for abdominal pain, nausea and vomiting.   Genitourinary:  Negative for dysuria and hematuria.   Musculoskeletal:  Negative for arthralgias and myalgias.   Integumentary:  Negative for rash.   Neurological:  Negative for dizziness and syncope.   Hematological:  Negative for adenopathy.   Psychiatric/Behavioral:  Negative for confusion. The patient is not nervous/anxious.            Objective:      Physical Exam  Vitals and nursing note reviewed.   Constitutional:       General: He is not in acute distress.  HENT:      Head: Normocephalic.      Right Ear: Tympanic membrane and ear canal normal.      Ears:      Comments: Left TM erythematous, scan fluid behind TM     Nose: Congestion present.   Eyes:      Conjunctiva/sclera: Conjunctivae normal.      Pupils: Pupils are equal, round, and reactive to light.   Cardiovascular:       Rate and Rhythm: Normal rate and regular rhythm.   Pulmonary:      Effort: Pulmonary effort is normal.      Breath sounds: Normal breath sounds.   Abdominal:      Palpations: Abdomen is soft.      Tenderness: There is no abdominal tenderness.   Musculoskeletal:         General: No deformity or signs of injury.   Skin:     General: Skin is warm and dry.   Neurological:      General: No focal deficit present.      Mental Status: He is alert. Mental status is at baseline.   Psychiatric:         Mood and Affect: Mood normal.         Behavior: Behavior normal.           Assessment & Plan:   1. Sinus congestion  -     fluticasone propionate (FLONASE) 50 mcg/actuation nasal spray; 1 spray (50 mcg total) by Each Nostril route 2 (two) times a day.  Dispense: 15.8 mL; Refill: 11    2. Primary hypertension  Overview:  Intolerant of ACE-I and BB in past    Orders:  -     olmesartan (BENICAR) 5 MG Tab; Take 1 tablet (5 mg total) by mouth 2 (two) times a day.  Dispense: 180 tablet; Refill: 3    3. Left otitis media, unspecified otitis media type  -     doxycycline (VIBRA-TABS) 100 MG tablet; Take 1 tablet (100 mg total) by mouth 2 (two) times daily.  Dispense: 14 tablet; Refill: 0    4. Former smoker  -     CT Chest Lung Screening Low Dose; Future; Expected date: 01/29/2024    5. Smoking greater than 20 pack years  -     CT Chest Lung Screening Low Dose; Future; Expected date: 01/29/2024    6. Screen for colon cancer  -     Ambulatory referral/consult to Gastroenterology    7. Pain in both thighs  Assessment & Plan:  Symptoms consistent with meralgia paresthetica although atypical that it worsens w/standing and improves w/sitting. Still counseled on loose clothing at waist, stretches, weight loss, etc. Also recommended he discuss with Neurosurgery given complex spine history        F/u in 6 months for HTN. In addition to their scheduled follow up, the patient has also been instructed to follow up on as needed basis.

## 2024-01-30 ENCOUNTER — TELEPHONE (OUTPATIENT)
Dept: PRIMARY CARE CLINIC | Facility: CLINIC | Age: 54
End: 2024-01-30
Payer: COMMERCIAL

## 2024-01-30 RX ORDER — BENZONATATE 100 MG/1
100 CAPSULE ORAL 3 TIMES DAILY PRN
Qty: 30 CAPSULE | Refills: 0 | Status: SHIPPED | OUTPATIENT
Start: 2024-01-30 | End: 2024-02-09

## 2024-01-30 RX ORDER — LEVOFLOXACIN 750 MG/1
750 TABLET ORAL DAILY
Qty: 5 TABLET | Refills: 0 | Status: SHIPPED | OUTPATIENT
Start: 2024-01-30

## 2024-01-30 NOTE — TELEPHONE ENCOUNTER
----- Message from Aleksander Fernandez MA sent at 1/29/2024  9:42 AM CST -----  Regarding: FW: call  Reports no relief,   In addition to sinus and patient reports uncontrolled cough that has now mad his throat hurt.   ----- Message -----  From: Zohra Garsia  Sent: 1/29/2024   9:17 AM CST  To: [facility] Staff  Subject: call                                             .Type:  Needs Medical Advice    Who Called: pt  Symptoms (please be specific): sinus   How long has patient had these symptoms:  weeks  Pharmacy name and phone #:    Would the patient rather a call back or a response via [facility]?   Best Call Back Number:  [phone]  Additional Information: sinus infection med not helping

## 2024-01-30 NOTE — TELEPHONE ENCOUNTER
Sent Rx for stronger antibiotic (Levofloxacin) one tab daily for 5 days. Sent Rx for Tessalon Perles for cough.    Nick Monzon MD

## 2024-02-19 ENCOUNTER — TELEPHONE (OUTPATIENT)
Dept: PRIMARY CARE CLINIC | Facility: CLINIC | Age: 54
End: 2024-02-19
Payer: COMMERCIAL

## 2024-02-19 ENCOUNTER — E-VISIT (OUTPATIENT)
Dept: PRIMARY CARE CLINIC | Facility: CLINIC | Age: 54
End: 2024-02-19
Payer: COMMERCIAL

## 2024-02-19 DIAGNOSIS — J43.2 CENTRILOBULAR EMPHYSEMA: Primary | ICD-10-CM

## 2024-02-19 DIAGNOSIS — H93.8X9 SENSATION OF FULLNESS IN EAR, UNSPECIFIED LATERALITY: ICD-10-CM

## 2024-02-19 PROCEDURE — 99422 OL DIG E/M SVC 11-20 MIN: CPT | Mod: ,,, | Performed by: STUDENT IN AN ORGANIZED HEALTH CARE EDUCATION/TRAINING PROGRAM

## 2024-02-19 RX ORDER — ALBUTEROL SULFATE 90 UG/1
2 AEROSOL, METERED RESPIRATORY (INHALATION) EVERY 6 HOURS PRN
Qty: 18 G | Refills: 0 | Status: SHIPPED | OUTPATIENT
Start: 2024-02-19 | End: 2025-02-18

## 2024-02-19 RX ORDER — METHYLPREDNISOLONE 4 MG/1
TABLET ORAL
Qty: 21 EACH | Refills: 0 | Status: SHIPPED | OUTPATIENT
Start: 2024-02-19 | End: 2024-03-11

## 2024-02-19 NOTE — TELEPHONE ENCOUNTER
----- Message from [facility] sent at 2/19/2024  9:47 AM CST -----  .Type:  Needs Medical Advice    Who Called:  pt    Symptoms (please be specific):  fluid in both ears  wheezing     How long has patient had these symptoms:   2 days    Pharmacy name and phone #:   [facility]    Would the patient rather a call back or a response via [facility]?      Best Call Back Number:  [phone]    Additional Information:  pt would like something called in for his symptoms  thanks

## 2024-02-19 NOTE — PROGRESS NOTES
Patient ID: Eb Alfaro is a 53 y.o. male.    Chief Complaint: URI (Entered automatically based on patient selection in Patient Portal.)    The patient initiated a request through [employer] on 2/19/2024 for evaluation and management with a chief complaint of URI (Entered automatically based on patient selection in Patient Portal.)     I evaluated the questionnaire submission on 2/19/2024.    Ohs Peq Evisit Upper Respitatory/Cough Questionnaire    2/19/2024 12:37 PM CST - Filed by Patient   Do you agree to participate in an E-Visit? Yes   If you have any of the following symptoms, please present to your local ER or call 911:  I acknowledge   What is the main issue that you would like for your doctor to address today? Still have fluid in both my ears. Also alot of sinus pressure. And now im weasing and coughing. The weasing started a few days ago. I have taken three antibiotics.   Are you able to take your vital signs? Yes   Systolic Blood Pressure:    Diastolic Blood Pressure:    Weight: 235   Height: 6   Pulse:    Temperature:    Respiration rate:    Pulse Oxygen:    What symptoms do you currently have?  Cough;  Nasal Congestion   Describe your cough: Dry;  Bothersome (interferes with daily activities)   Have you ever smoked? I have smoked in the past   Have you had a fever? No   When did your symptoms first appear? 1/19/2024   In the last two weeks, have you been in close contact with someone who has COVID-19 or the Flu? No   In the last two weeks, have you worked or volunteered in a healthcare facility or as a ? Healthcare facilities include a hospital, medical or dental clinic, long-term care facility, or nursing home No   Do you live in a long-term care facility, nursing home, group home, or homeless shelter? No   List what you have done or taken to help your symptoms. All prescriptions taken .   How severe are your symptoms? Moderate   Have your symptoms improved since they first appeared?  Worse   Have you taken an at home Covid test? Yes   What were the results? Negative   Have you taken a Flu test? Yes   What were the results? Negative   Have you been fully vaccinated for COVID? (2 Pfizer, 2 Moderna or 1 [place] vaccine injections) No   Have you received your first dose of the Pfizer or Moderna COVID vaccine? No   Have you recieved a Flu shot? No   Do you have transportation to get tested for COVID if it is indicated and ordered for you at an [facility] location? Yes   Provide any information you feel is important to your history not asked above Worried about the weasing . Also have a metal taste in my mouth.   Please attach any relevant images or files          Encounter Diagnoses   Name Primary?    Centrilobular emphysema Yes    Sensation of fullness in ear, unspecified laterality         Orders Placed This Encounter   Procedures    Ambulatory referral/consult to ENT     Referral Priority:   Routine     Referral Type:   Consultation     Referral Reason:   Specialty Services Required     Referred to Provider:   Ajay Blum MD     Requested Specialty:   Otolaryngology     Number of Visits Requested:   1      Medications Ordered This Encounter   Medications    albuterol (VENTOLIN HFA) 90 mcg/actuation inhaler     Sig: Inhale 2 puffs into the lungs every 6 (six) hours as needed for Wheezing or Shortness of Breath. Rescue     Dispense:  18 g     Refill:  0    methylPREDNISolone (MEDROL DOSEPACK) 4 mg tablet     Sig: use as directed     Dispense:  21 each     Refill:  0        Multiple rounds of antimicrobials w/o improvement. Recent LDCT did note emphysema. Pt now reporting wheezing in addition to his URI symptoms. We'll treat as mild COPD exacerbation w/medrol dosepack and albuterol. Referring to ENT for sensation of fluid in ears.     Once exacerbation is cleared, will need PFTs to formally diagnose COPD.    E-Visit Time Trackin minutes

## 2024-03-01 LAB — CRC RECOMMENDATION EXT: NORMAL

## 2024-03-04 ENCOUNTER — DOCUMENTATION ONLY (OUTPATIENT)
Dept: PRIMARY CARE CLINIC | Facility: CLINIC | Age: 54
End: 2024-03-04
Payer: COMMERCIAL

## 2024-03-30 DIAGNOSIS — M48.062 LUMBAR STENOSIS WITH NEUROGENIC CLAUDICATION: ICD-10-CM

## 2024-03-30 DIAGNOSIS — G99.2 STENOSIS OF CERVICAL SPINE WITH MYELOPATHY: ICD-10-CM

## 2024-03-30 DIAGNOSIS — M48.02 STENOSIS OF CERVICAL SPINE WITH MYELOPATHY: ICD-10-CM

## 2024-04-01 RX ORDER — ORPHENADRINE CITRATE 100 MG/1
TABLET, EXTENDED RELEASE ORAL
Qty: 60 TABLET | Refills: 2 | Status: SHIPPED | OUTPATIENT
Start: 2024-04-01

## 2024-05-14 ENCOUNTER — TELEPHONE (OUTPATIENT)
Dept: PRIMARY CARE CLINIC | Facility: CLINIC | Age: 54
End: 2024-05-14
Payer: COMMERCIAL

## 2024-05-14 DIAGNOSIS — R05.3 CHRONIC COUGH: Primary | ICD-10-CM

## 2024-05-14 NOTE — TELEPHONE ENCOUNTER
----- Message from [facility] sent at 5/14/2024 12:57 PM CDT -----  .Type:  Patient Requesting Referral    Who Called: pt    Does the patient already have the specialty appointment scheduled?: no    If yes, what is the date of that appointment?: no    Referral to What Specialty: Pulmonologist     Reason for Referral: wheezing     Does the patient want the referral with a specific physician?: any    [facility] the [facility]    Patient Requesting a Response?: yes    Would the patient rather a call back or a response via [facility]?      Best Call Back Number: [phone]    Additional Information:  pt states his ENT said he needed to see a Pulmonologist he wants Dr. Romero to refer him to one

## 2024-05-15 NOTE — TELEPHONE ENCOUNTER
We can try to refer him to Pulm at [facility]. Referral placed for Dr. Springer. I can't guarantee they'll see him any sooner. It takes a long time to get into Pulm.    If he wants to discuss further with me, please schedule visit. We could start some of the workup that Pulm is likely to do.     Thanks  Nick Monzon MD

## 2024-05-20 DIAGNOSIS — M48.062 LUMBAR STENOSIS WITH NEUROGENIC CLAUDICATION: ICD-10-CM

## 2024-05-20 DIAGNOSIS — G99.2 STENOSIS OF CERVICAL SPINE WITH MYELOPATHY: Primary | ICD-10-CM

## 2024-05-20 DIAGNOSIS — M48.02 STENOSIS OF CERVICAL SPINE WITH MYELOPATHY: Primary | ICD-10-CM

## 2024-05-20 RX ORDER — GABAPENTIN 300 MG/1
300 CAPSULE ORAL 2 TIMES DAILY
Qty: 60 CAPSULE | Refills: 2 | Status: SHIPPED | OUTPATIENT
Start: 2024-05-20

## 2024-05-20 NOTE — TELEPHONE ENCOUNTER
Received a fax from pharmacy requesting a refill of gabapentin.  Rx e-scribed to [facility] on [address].

## 2024-07-16 ENCOUNTER — TELEPHONE (OUTPATIENT)
Dept: PRIMARY CARE CLINIC | Facility: CLINIC | Age: 54
End: 2024-07-16
Payer: COMMERCIAL

## 2024-07-16 ENCOUNTER — LAB VISIT (OUTPATIENT)
Dept: LAB | Facility: HOSPITAL | Age: 54
End: 2024-07-16
Attending: INTERNAL MEDICINE
Payer: COMMERCIAL

## 2024-07-16 DIAGNOSIS — J45.909 ASTHMA, UNSPECIFIED ASTHMA SEVERITY, UNSPECIFIED WHETHER COMPLICATED, UNSPECIFIED WHETHER PERSISTENT: ICD-10-CM

## 2024-07-16 LAB
EOSINOPHIL # BLD AUTO: 0.62 X10(3)/MCL (ref 0–0.9)
EOSINOPHIL COUNT (OHS): 619.65 UNIT/L (ref 0–350)
EOSINOPHIL NFR BLD AUTO: 8.5 %
WBC # BLD AUTO: 7.29 X10(3)/MCL (ref 4.5–11.5)

## 2024-07-16 PROCEDURE — 85048 AUTOMATED LEUKOCYTE COUNT: CPT

## 2024-07-16 PROCEDURE — 36415 COLL VENOUS BLD VENIPUNCTURE: CPT

## 2024-07-16 PROCEDURE — 86003 ALLG SPEC IGE CRUDE XTRC EA: CPT

## 2024-07-16 NOTE — TELEPHONE ENCOUNTER
1.Are there any outstanding task in patient chart? n         2. Do we have outstanding/pending referrals? n        3. Has the patient been seen in an ER, Urgent Care, or admitted since last visit? n        4. Has patient seen any other healthcare providers since last visit? YDr. Fiore - Pulmonology        5. Has patient had any blood work or xrays done since last visit? n     6. Is the patient's pneumonia vaccine current? N/a  Prevnar 13:  Pneumonia 20:  Pneumonia 23:     7. When was the patient's colonoscopy? 3/1/24     8. When was the patient's last mamogram? N/a     9. When was the patient's last cervical screening/PAP smear? N/a     10. When was the patient's last bone scan? N/a      11. When was the patient's last diabetic screening?  A1c: 12/9/22  Micro: n/a  Eye Exam: n/a

## 2024-07-17 LAB
ALLERGEN ASPERGILLUS FUMIGATUS IGE (OLG): <0.1 KUA/L
PHADIOTOP IGE QN: 152 KU/L

## 2024-08-19 ENCOUNTER — TELEPHONE (OUTPATIENT)
Dept: NEUROSURGERY | Facility: CLINIC | Age: 54
End: 2024-08-19
Payer: COMMERCIAL

## 2024-08-19 NOTE — TELEPHONE ENCOUNTER
It was requested electronically as well. He has not been seen in over a year and is prn. He needs to obtain further refills from his PCP.

## 2024-08-20 ENCOUNTER — TELEPHONE (OUTPATIENT)
Dept: PRIMARY CARE CLINIC | Facility: CLINIC | Age: 54
End: 2024-08-20
Payer: COMMERCIAL

## 2024-08-20 DIAGNOSIS — M48.062 LUMBAR STENOSIS WITH NEUROGENIC CLAUDICATION: ICD-10-CM

## 2024-08-20 DIAGNOSIS — G99.2 STENOSIS OF CERVICAL SPINE WITH MYELOPATHY: ICD-10-CM

## 2024-08-20 DIAGNOSIS — M48.02 STENOSIS OF CERVICAL SPINE WITH MYELOPATHY: ICD-10-CM

## 2024-08-20 RX ORDER — GABAPENTIN 300 MG/1
300 CAPSULE ORAL 2 TIMES DAILY
Qty: 60 CAPSULE | Refills: 5 | Status: SHIPPED | OUTPATIENT
Start: 2024-08-20

## 2024-08-20 NOTE — TELEPHONE ENCOUNTER
----- Message from Jami Bello LPN sent at 8/19/2024  4:03 PM CDT -----  Last appt: 1/22/24  Upcoming appt: none scheduled - Pt cancelled 7/22/24 follow up appt - LVM requesting a return call to schedule.  Last filled: 5/20/24 - Dr. Rodriguez  ----- Message -----  From: Kiersten Bocanegra  Sent: 8/19/2024   3:38 PM CDT  To: [facility] Staff    Who Called: Eb Alfaro    Refill or New Rx:Refill    RX Name and Strength:gabapentin (NEURONTIN) 300 MG capsule    How is the patient currently taking it? (ex. 1XDay):    Is this a 30 day or 90 day RX:    Local or Mail Order:    List of preferred pharmacies on file (remove unneeded): [id number] [address] [place] [address] AT [facility] [address]   Phone: [phone]  Fax: [phone]    Ordering Provider:Dr. Rodriguez    Preferred Method of Contact: Phone Call    Patient's Preferred Phone Number on File: [phone]     Best Call Back Number, if different:    Additional Information: Dr. Rodriguez stated patient will have to get rx from PCP

## 2024-09-22 DIAGNOSIS — G99.2 STENOSIS OF CERVICAL SPINE WITH MYELOPATHY: ICD-10-CM

## 2024-09-22 DIAGNOSIS — M48.062 LUMBAR STENOSIS WITH NEUROGENIC CLAUDICATION: ICD-10-CM

## 2024-09-22 DIAGNOSIS — J43.2 CENTRILOBULAR EMPHYSEMA: ICD-10-CM

## 2024-09-22 DIAGNOSIS — M48.02 STENOSIS OF CERVICAL SPINE WITH MYELOPATHY: ICD-10-CM

## 2024-09-22 DIAGNOSIS — I10 PRIMARY HYPERTENSION: ICD-10-CM

## 2024-09-23 DIAGNOSIS — Z12.5 SCREENING FOR MALIGNANT NEOPLASM OF PROSTATE: ICD-10-CM

## 2024-09-23 DIAGNOSIS — Z00.00 WELL ADULT EXAM: Primary | ICD-10-CM

## 2024-09-23 DIAGNOSIS — J43.2 CENTRILOBULAR EMPHYSEMA: ICD-10-CM

## 2024-09-23 RX ORDER — ORPHENADRINE CITRATE 100 MG/1
100 TABLET, EXTENDED RELEASE ORAL 2 TIMES DAILY PRN
Qty: 60 TABLET | Refills: 1 | Status: SHIPPED | OUTPATIENT
Start: 2024-09-23

## 2024-09-23 RX ORDER — ALBUTEROL SULFATE 90 UG/1
2 INHALANT RESPIRATORY (INHALATION) EVERY 6 HOURS PRN
Qty: 8.5 G | OUTPATIENT
Start: 2024-09-23

## 2024-09-23 RX ORDER — ALBUTEROL SULFATE 90 UG/1
2 INHALANT RESPIRATORY (INHALATION) EVERY 6 HOURS PRN
Qty: 18 G | Refills: 0 | Status: SHIPPED | OUTPATIENT
Start: 2024-09-23 | End: 2025-09-23

## 2024-09-23 RX ORDER — OLMESARTAN MEDOXOMIL 5 MG/1
5 TABLET ORAL 2 TIMES DAILY
Qty: 180 TABLET | Refills: 0 | Status: SHIPPED | OUTPATIENT
Start: 2024-09-23

## 2024-09-23 NOTE — TELEPHONE ENCOUNTER
Last appt: 1/22/24  Upcoming appt: 10/28/24  Last filled: 1/22/24    *Pt is requesting a refilled of NORFLEX. States he was told by Dr. Rodriguez that this medication needs to be filled by PCP.

## 2024-11-04 ENCOUNTER — LAB VISIT (OUTPATIENT)
Dept: LAB | Facility: HOSPITAL | Age: 54
End: 2024-11-04
Attending: STUDENT IN AN ORGANIZED HEALTH CARE EDUCATION/TRAINING PROGRAM
Payer: COMMERCIAL

## 2024-11-04 DIAGNOSIS — J82.83 EOSINOPHILIC ASTHMA: ICD-10-CM

## 2024-11-04 DIAGNOSIS — Z12.5 SCREENING FOR MALIGNANT NEOPLASM OF PROSTATE: ICD-10-CM

## 2024-11-04 DIAGNOSIS — Z00.00 WELL ADULT EXAM: ICD-10-CM

## 2024-11-04 LAB
ALBUMIN SERPL-MCNC: 4 G/DL (ref 3.5–5)
ALBUMIN/GLOB SERPL: 1.2 RATIO (ref 1.1–2)
ALP SERPL-CCNC: 97 UNIT/L (ref 40–150)
ALT SERPL-CCNC: 25 UNIT/L (ref 0–55)
ANION GAP SERPL CALC-SCNC: 11 MEQ/L
AST SERPL-CCNC: 19 UNIT/L (ref 5–34)
BACTERIA #/AREA URNS AUTO: ABNORMAL /HPF
BASOPHILS # BLD AUTO: 0.02 X10(3)/MCL
BASOPHILS NFR BLD AUTO: 0.3 %
BILIRUB SERPL-MCNC: 0.5 MG/DL
BILIRUB UR QL STRIP.AUTO: NEGATIVE
BUN SERPL-MCNC: 13.5 MG/DL (ref 8.4–25.7)
CALCIUM SERPL-MCNC: 9.5 MG/DL (ref 8.4–10.2)
CHLORIDE SERPL-SCNC: 107 MMOL/L (ref 98–107)
CHOLEST SERPL-MCNC: 168 MG/DL
CHOLEST/HDLC SERPL: 4 {RATIO} (ref 0–5)
CLARITY UR: CLEAR
CO2 SERPL-SCNC: 24 MMOL/L (ref 22–29)
COLOR UR AUTO: ABNORMAL
CREAT SERPL-MCNC: 1.2 MG/DL (ref 0.72–1.25)
CREAT/UREA NIT SERPL: 11
EOSINOPHIL # BLD AUTO: 0 X10(3)/MCL (ref 0–0.9)
EOSINOPHIL NFR BLD AUTO: 0 %
ERYTHROCYTE [DISTWIDTH] IN BLOOD BY AUTOMATED COUNT: 12.4 % (ref 11.5–17)
EST. AVERAGE GLUCOSE BLD GHB EST-MCNC: 111.2 MG/DL
GFR SERPLBLD CREATININE-BSD FMLA CKD-EPI: >60 ML/MIN/1.73/M2
GLOBULIN SER-MCNC: 3.3 GM/DL (ref 2.4–3.5)
GLUCOSE SERPL-MCNC: 106 MG/DL (ref 74–100)
GLUCOSE UR QL STRIP: NORMAL
HBA1C MFR BLD: 5.5 %
HCT VFR BLD AUTO: 45.6 % (ref 42–52)
HDLC SERPL-MCNC: 43 MG/DL (ref 35–60)
HGB BLD-MCNC: 16.1 G/DL (ref 14–18)
HGB UR QL STRIP: ABNORMAL
IMM GRANULOCYTES # BLD AUTO: 0.02 X10(3)/MCL (ref 0–0.04)
IMM GRANULOCYTES NFR BLD AUTO: 0.3 %
KETONES UR QL STRIP: NEGATIVE
LDLC SERPL CALC-MCNC: 70 MG/DL (ref 50–140)
LEUKOCYTE ESTERASE UR QL STRIP: NEGATIVE
LYMPHOCYTES # BLD AUTO: 2.12 X10(3)/MCL (ref 0.6–4.6)
LYMPHOCYTES NFR BLD AUTO: 27.5 %
MCH RBC QN AUTO: 31.3 PG (ref 27–31)
MCHC RBC AUTO-ENTMCNC: 35.3 G/DL (ref 33–36)
MCV RBC AUTO: 88.5 FL (ref 80–94)
MONOCYTES # BLD AUTO: 0.62 X10(3)/MCL (ref 0.1–1.3)
MONOCYTES NFR BLD AUTO: 8.1 %
MUCOUS THREADS URNS QL MICRO: ABNORMAL /LPF
NEUTROPHILS # BLD AUTO: 4.92 X10(3)/MCL (ref 2.1–9.2)
NEUTROPHILS NFR BLD AUTO: 63.8 %
NITRITE UR QL STRIP: NEGATIVE
NRBC BLD AUTO-RTO: 0 %
PH UR STRIP: 6 [PH]
PLATELET # BLD AUTO: 210 X10(3)/MCL (ref 130–400)
PMV BLD AUTO: 9.2 FL (ref 7.4–10.4)
POTASSIUM SERPL-SCNC: 3.8 MMOL/L (ref 3.5–5.1)
PROT SERPL-MCNC: 7.3 GM/DL (ref 6.4–8.3)
PROT UR QL STRIP: NEGATIVE
PSA SERPL-MCNC: 0.23 NG/ML
RBC # BLD AUTO: 5.15 X10(6)/MCL (ref 4.7–6.1)
RBC #/AREA URNS AUTO: ABNORMAL /HPF
SODIUM SERPL-SCNC: 142 MMOL/L (ref 136–145)
SP GR UR STRIP.AUTO: 1.02 (ref 1–1.03)
SQUAMOUS #/AREA URNS LPF: ABNORMAL /HPF
TRIGL SERPL-MCNC: 275 MG/DL (ref 34–140)
TSH SERPL-ACNC: 3.38 UIU/ML (ref 0.35–4.94)
UROBILINOGEN UR STRIP-ACNC: NORMAL
VLDLC SERPL CALC-MCNC: 55 MG/DL
WBC # BLD AUTO: 7.7 X10(3)/MCL (ref 4.5–11.5)
WBC #/AREA URNS AUTO: ABNORMAL /HPF

## 2024-11-04 PROCEDURE — 86036 ANCA SCREEN EACH ANTIBODY: CPT

## 2024-11-04 PROCEDURE — 84443 ASSAY THYROID STIM HORMONE: CPT

## 2024-11-04 PROCEDURE — 83036 HEMOGLOBIN GLYCOSYLATED A1C: CPT

## 2024-11-04 PROCEDURE — 80053 COMPREHEN METABOLIC PANEL: CPT

## 2024-11-04 PROCEDURE — 36415 COLL VENOUS BLD VENIPUNCTURE: CPT

## 2024-11-04 PROCEDURE — 85025 COMPLETE CBC W/AUTO DIFF WBC: CPT

## 2024-11-04 PROCEDURE — 80061 LIPID PANEL: CPT

## 2024-11-04 PROCEDURE — 81001 URINALYSIS AUTO W/SCOPE: CPT

## 2024-11-04 PROCEDURE — 84153 ASSAY OF PSA TOTAL: CPT

## 2024-11-06 LAB
C-ANCA TITR SER IF: NEGATIVE {TITER}
P-ANCA SER QL IF: NEGATIVE

## 2024-11-18 DIAGNOSIS — I10 PRIMARY HYPERTENSION: ICD-10-CM

## 2024-11-18 RX ORDER — OLMESARTAN MEDOXOMIL 5 MG/1
5 TABLET ORAL 2 TIMES DAILY
Qty: 180 TABLET | Refills: 0 | Status: SHIPPED | OUTPATIENT
Start: 2024-11-18

## 2024-12-06 ENCOUNTER — PATIENT MESSAGE (OUTPATIENT)
Dept: PRIMARY CARE CLINIC | Facility: CLINIC | Age: 54
End: 2024-12-06

## 2024-12-06 ENCOUNTER — E-VISIT (OUTPATIENT)
Dept: PRIMARY CARE CLINIC | Facility: CLINIC | Age: 54
End: 2024-12-06
Payer: COMMERCIAL

## 2024-12-06 ENCOUNTER — TELEPHONE (OUTPATIENT)
Dept: PRIMARY CARE CLINIC | Facility: CLINIC | Age: 54
End: 2024-12-06
Payer: COMMERCIAL

## 2024-12-06 DIAGNOSIS — J02.9 SORE THROAT: Primary | ICD-10-CM

## 2024-12-06 DIAGNOSIS — R05.9 COUGH, UNSPECIFIED TYPE: ICD-10-CM

## 2024-12-06 LAB
CTP QC/QA: YES
CTP QC/QA: YES
FLUAV AG NPH QL: NEGATIVE
FLUBV AG NPH QL: NEGATIVE
S PYO RRNA THROAT QL PROBE: NEGATIVE

## 2024-12-06 RX ORDER — BENZONATATE 100 MG/1
CAPSULE ORAL
Qty: 30 CAPSULE | Refills: 0 | Status: SHIPPED | OUTPATIENT
Start: 2024-12-06

## 2024-12-06 NOTE — PROGRESS NOTES
Patient ID: Eb Alfaro is a 54 y.o. male.    Chief Complaint: General Illness (Entered automatically based on patient selection in [employer].)    The patient initiated a request through [employer] on 12/6/2024 for evaluation and management with a chief complaint of General Illness (Entered automatically based on patient selection in [employer].)     I evaluated the questionnaire submission on 12/6/24.    Ohs Peq Evisit Supergroup-Cough And Cold    12/6/2024 10:43 AM CST - Filed by Patient   What do you need help with? Strep Throat   Do you agree to participate in an E-Visit? Yes   If you have any of the following symptoms, go to your local emergency room or call 911: I acknowledge   What is the main issue you would like addressed today? My throat is hurting   Do you think you might have COVID or the Flu? No   Have you tested positive for COVID or Flu? No   What symptoms do you currently have?  Cough;  Nasal Congestion   Describe your cough: Dry   Have you ever smoked? I smoked in the past   Have you had a fever? Yes   What has been the range of your fever? I have not checked my temperature with a thermometer.   When did your symptoms first appear? 11/5/2024   In the last two weeks, have you been in close contact with someone who has COVID-19 or the Flu? No   List what you have done or taken to help your symptoms. Advil   How severe are your symptoms? Severe   Have your symptoms gotten better or worse since they started?  Worse   Do you have transportation to get testing if it is needed and ordered for you at an [facility] location? Yes   Provide any additional information you feel is important. It started hurt yesterday 11/5/24; This morning it has gotten worst.; Hurts to swallow.   Please attach any relevant images or files    Are you able to take your vital signs? No         Encounter Diagnoses   Name Primary?    Sore throat Yes    Cough, unspecified type         Orders Placed This Encounter   Procedures    POCT Rapid  Strep A    POCT Influenza A/B Rapid Antigen            1. Sore throat  Comments:  strep and flu negative  Recommended conservative/OTC meds  Notify MD if worsening  Orders:  -     POCT Rapid Strep A  -     POCT Influenza A/B Rapid Antigen    2. Cough, unspecified type  -     POCT Rapid Strep A  -     POCT Influenza A/B Rapid Antigen  -     benzonatate (TESSALON) 100 MG capsule; Take 1 or 2 capsules by mouth every 8 hours as needed for cough. Not to exceed 6 capsules per day.  Dispense: 30 capsule; Refill: 0        E-Visit Time Trackin min

## 2024-12-06 NOTE — TELEPHONE ENCOUNTER
----- Message from Ilda sent at 12/6/2024  9:51 AM CST -----  .Who Called: Eb Alfaro    Caller is requesting assistance/information from provider's office.    Symptoms (please be specific): upper palate pain, sore throat and uvula pain (painful)    How long has patient had these symptoms: yesterday     List of preferred pharmacies on file (remove unneeded): [id number] [facility] [address]   Phone: [phone]  Fax: [phone]    Preferred Method of Contact: Phone Call    Patient's Preferred Phone Number on File: [phone]     Best Call Back Number, if different:    Additional Information: Pt is requesting a prescription for an antibiotic sent in to pharmacy. Pt also stated to please pay attention to his allergies. Requesting a cb. Please advise, thank you.

## 2024-12-15 DIAGNOSIS — E78.00 HYPERCHOLESTEROLEMIA: ICD-10-CM

## 2024-12-16 RX ORDER — ROSUVASTATIN CALCIUM 10 MG/1
10 TABLET, COATED ORAL NIGHTLY
Qty: 90 TABLET | Refills: 3 | Status: SHIPPED | OUTPATIENT
Start: 2024-12-16

## 2024-12-17 DIAGNOSIS — M48.02 STENOSIS OF CERVICAL SPINE WITH MYELOPATHY: ICD-10-CM

## 2024-12-17 DIAGNOSIS — G99.2 STENOSIS OF CERVICAL SPINE WITH MYELOPATHY: ICD-10-CM

## 2024-12-17 DIAGNOSIS — M48.062 LUMBAR STENOSIS WITH NEUROGENIC CLAUDICATION: ICD-10-CM

## 2024-12-17 RX ORDER — ORPHENADRINE CITRATE 100 MG/1
100 TABLET, EXTENDED RELEASE ORAL 2 TIMES DAILY PRN
Qty: 60 TABLET | Refills: 1 | Status: SHIPPED | OUTPATIENT
Start: 2024-12-17

## 2024-12-22 ENCOUNTER — NURSE TRIAGE (OUTPATIENT)
Dept: ADMINISTRATIVE | Facility: CLINIC | Age: 54
End: 2024-12-22
Payer: COMMERCIAL

## 2024-12-22 ENCOUNTER — OFFICE VISIT (OUTPATIENT)
Dept: URGENT CARE | Facility: CLINIC | Age: 54
End: 2024-12-22
Payer: COMMERCIAL

## 2024-12-22 VITALS
WEIGHT: 240 LBS | HEIGHT: 72 IN | TEMPERATURE: 98 F | BODY MASS INDEX: 32.51 KG/M2 | DIASTOLIC BLOOD PRESSURE: 81 MMHG | HEART RATE: 84 BPM | OXYGEN SATURATION: 97 % | RESPIRATION RATE: 18 BRPM | SYSTOLIC BLOOD PRESSURE: 135 MMHG

## 2024-12-22 DIAGNOSIS — J01.01 ACUTE RECURRENT MAXILLARY SINUSITIS: ICD-10-CM

## 2024-12-22 DIAGNOSIS — R05.1 ACUTE COUGH: Primary | ICD-10-CM

## 2024-12-22 RX ORDER — PROMETHAZINE HYDROCHLORIDE AND DEXTROMETHORPHAN HYDROBROMIDE 6.25; 15 MG/5ML; MG/5ML
5 SYRUP ORAL EVERY 4 HOURS PRN
Qty: 180 ML | Refills: 0 | Status: SHIPPED | OUTPATIENT
Start: 2024-12-22 | End: 2025-01-01

## 2024-12-22 RX ORDER — PREDNISONE 20 MG/1
20 TABLET ORAL 2 TIMES DAILY
Qty: 14 TABLET | Refills: 0 | Status: SHIPPED | OUTPATIENT
Start: 2024-12-22 | End: 2024-12-29

## 2024-12-22 RX ORDER — DEXAMETHASONE SODIUM PHOSPHATE 10 MG/ML
10 INJECTION INTRAMUSCULAR; INTRAVENOUS
Status: COMPLETED | OUTPATIENT
Start: 2024-12-22 | End: 2024-12-22

## 2024-12-22 RX ORDER — DOXYCYCLINE HYCLATE 100 MG
100 TABLET ORAL 2 TIMES DAILY
Qty: 20 TABLET | Refills: 0 | Status: SHIPPED | OUTPATIENT
Start: 2024-12-22

## 2024-12-22 RX ADMIN — DEXAMETHASONE SODIUM PHOSPHATE 10 MG: 10 INJECTION INTRAMUSCULAR; INTRAVENOUS at 09:12

## 2024-12-22 NOTE — PROGRESS NOTES
Subjective:      Patient ID: Eb Alfaro is a 54 y.o. male.    Vitals:  height is 6' (1.829 m) and weight is 108.9 kg (240 lb). His temperature is 98 °F (36.7 °C). His blood pressure is 135/81 and his pulse is 84. His respiration is 18 and oxygen saturation is 97%.     Chief Complaint: Nasal Congestion     Patient is a 54 y.o. male who presents to urgent care with complaints of cough,  nasal congestion with thick yellow mucus still after seeing his PCP on the 6th.       Constitution: Negative for fever.   HENT:  Positive for congestion and sinus pressure.    Respiratory:  Positive for cough. Negative for shortness of breath.       Objective:     Physical Exam   Constitutional: He is oriented to person, place, and time. He appears well-developed. He is cooperative.  Non-toxic appearance. He does not appear ill. No distress.   HENT:   Head: Normocephalic and atraumatic.   Ears:   Right Ear: Hearing, tympanic membrane, external ear and ear canal normal.   Left Ear: Hearing, tympanic membrane, external ear and ear canal normal.   Nose: Nose normal. No mucosal edema, rhinorrhea or nasal deformity. No epistaxis. Right sinus exhibits no maxillary sinus tenderness and no frontal sinus tenderness. Left sinus exhibits no maxillary sinus tenderness and no frontal sinus tenderness.   Mouth/Throat: Uvula is midline, oropharynx is clear and moist and mucous membranes are normal. No trismus in the jaw. Normal dentition. No uvula swelling. No oropharyngeal exudate, posterior oropharyngeal edema or posterior oropharyngeal erythema.   Eyes: Conjunctivae and lids are normal. No scleral icterus.   Neck: Trachea normal and phonation normal. Neck supple. No edema present. No erythema present. No neck rigidity present.   Cardiovascular: Normal rate, regular rhythm, normal heart sounds and normal pulses.   Pulmonary/Chest: Effort normal and breath sounds normal. No respiratory distress. He has no decreased breath sounds. He has no  rhonchi.   Abdominal: Normal appearance.   Musculoskeletal: Normal range of motion.         General: No deformity. Normal range of motion.   Neurological: He is alert and oriented to person, place, and time. He exhibits normal muscle tone. Coordination normal.   Skin: Skin is warm, dry, intact, not diaphoretic and not pale.   Psychiatric: His speech is normal and behavior is normal. Judgment and thought content normal.   Nursing note and vitals reviewed.    Previous History:     Review of patient's allergies indicates:   Allergen Reactions    Cefdinir Anaphylaxis    Penicillins Anaphylaxis    Hydrocodone-acetaminophen Itching    Morphine Hives    Opioids - morphine analogues Hives       Past Medical History:   Diagnosis Date    Degeneration, intervertebral disc, cervicothoracic     Herniated nucleus pulposus with myelopathy, cervical     High cholesterol     Hypertension     Kidney stones     Muscle spasms of neck     PONV (postoperative nausea and vomiting)     Spinal stenosis of lumbar region with neurogenic claudication     Stenosis of cervical spine with myelopathy      Current Outpatient Medications   Medication Instructions    albuterol (VENTOLIN HFA) 90 mcg/actuation inhaler 2 puffs, Inhalation, Every 6 hours PRN, Rescue    benzonatate (TESSALON) 100 MG capsule Take 1 or 2 capsules by mouth every 8 hours as needed for cough. Not to exceed 6 capsules per day.    doxycycline (VIBRA-TABS) 100 mg, Oral, 2 times daily    doxycycline (VIBRA-TABS) 100 mg, Oral, 2 times daily    EPINEPHrine (EPIPEN 2-TERESA) 0.6 mg, Intramuscular, Once    FASENRA PEN 30 mg    fluticasone propionate (FLONASE) 50 mcg, Each Nostril, 2 times daily    fluticasone-umeclidin-vilanter (TRELEGY ELLIPTA) 200-62.5-25 mcg inhaler 1 puff, Inhalation, Daily    gabapentin (NEURONTIN) 300 mg, Oral, 2 times daily    ibuprofen (ADVIL,MOTRIN) 200 mg, Every 6 hours PRN    levocetirizine (XYZAL) 5 mg, Nightly    levoFLOXacin (LEVAQUIN) 750 mg, Oral, Daily     methylPREDNISolone (MEDROL DOSEPACK) 4 mg tablet use as directed    montelukast (SINGULAIR) 10 mg    olmesartan (BENICAR) 5 mg, Oral, 2 times daily    orphenadrine (NORFLEX) 100 mg, Oral, 2 times daily PRN    predniSONE (DELTASONE) 20 mg, Oral, 2 times daily    promethazine-dextromethorphan (PROMETHAZINE-DM) 6.25-15 mg/5 mL Syrp 5 mLs, Oral, Every 4 hours PRN    rosuvastatin (CRESTOR) 10 mg, Oral, Nightly     Past Surgical History:   Procedure Laterality Date    APPENDECTOMY      Bilateral L4-5, L5-S1 decompression; right L5-S1 microdiscectomy  03/08/2022    Dr. Rodriguez    C3-5 laminectomies, C2-C6 posterior instrumented fusion  03/08/2022    Dr. Rodriguez    CHOLECYSTECTOMY      colonocscopy  08/05/2015    Dr Yaakov Gaviria    CYSTOSCOPY W/ URETERAL STENT PLACEMENT Left 12/21/2022    Procedure: CYSTOSCOPY, WITH URETERAL STENT INSERTION;  Surgeon: Ramirez Wei MD;  Location: [facility];  Service: Urology;  Laterality: Left;  LEFT URETERAL STENT PLACEMENT    CYSTOURETEROSCOPY, WITH HOLMIUM LASER LITHOTRIPSY OF URETERAL CALCULUS AND STENT INSERTION Left 02/03/2023    Procedure: CYSTOURETEROSCOPY, WITH HOLMIUM LASER LITHOTRIPSY OF URETERAL CALCULUS AND STENT INSERTION / Stent exchange Left;  Surgeon: Ramirez Wei MD;  Location: [facility];  Service: Urology;  Laterality: Left;    LEFT HEART CATHETERIZATION  10/16/2017    MAGNETIC RESONANCE IMAGING N/A 03/22/2023    Procedure: MRI (Magnetic Resonance Imagine);  Surgeon: Yash Murphy MD;  Location: [facility];  Service: Medicine;  Laterality: N/A;  MRI LUMBAR SPINE W/O CONTRAST / MRI BRAIN W/O CONTRAST WITH ANESTHESIA    PARATHYROIDECTOMY N/A 05/06/2022    Procedure: PARATHYROIDECTOMY;  Surgeon: Ajay Blum MD;  Location: [facility];  Service: ENT;  Laterality: N/A;  PARATHYROIDECTOMY // NERVE MONITOR // FROZEN SECTION // INTRA OP PTH // SUPINE     Family History   Problem Relation Name Age of Onset    Cancer Mother          meningiomas    Liver cancer  Mother      Cancer Father          colon    Liver disease Sister         Social History     Tobacco Use    Smoking status: Former     Types: Cigarettes    Smokeless tobacco: Never    Tobacco comments:     Quit in 2013   Substance Use Topics    Alcohol use: Yes     Alcohol/week: 2.0 standard drinks of alcohol     Types: 2 Standard drinks or equivalent per week     Comment: SOCIALLY    Drug use: No     Assessment:     1. Acute cough    2. Acute recurrent maxillary sinusitis        Plan:   Nasal saline, Flonase nasal spray, Claritin OTC as directed  Take Tylenol or ibuprofen OTC for fever and pain as directed  take antibiotic if prescribed to completion.  Doxycycline  Take Mucinex OTC for cough as directed or prescription promethazine DM preferably at night   follow up with your primary care provider within 2-5 days if your signs and symptoms have not resolved or worsen.   If condition worsens or fails to improve we recommend that you receive another evaluation with your primary medical clinic to discuss your concerns.      Acute cough  -     XR CHEST PA AND LATERAL; Future; Expected date: 12/22/2024  -     dexAMETHasone injection 10 mg  -     promethazine-dextromethorphan (PROMETHAZINE-DM) 6.25-15 mg/5 mL Syrp; Take 5 mLs by mouth every 4 (four) hours as needed (cough).  Dispense: 180 mL; Refill: 0    Acute recurrent maxillary sinusitis  -     predniSONE (DELTASONE) 20 MG tablet; Take 1 tablet (20 mg total) by mouth 2 (two) times daily. for 7 days  Dispense: 14 tablet; Refill: 0  -     doxycycline (VIBRA-TABS) 100 MG tablet; Take 1 tablet (100 mg total) by mouth 2 (two) times daily.  Dispense: 20 tablet; Refill: 0

## 2024-12-22 NOTE — TELEPHONE ENCOUNTER
Pt calling with c/o sinus congestion and cough. Pt triaged for both and care advice to see MD within 4 hours. Pt said that he did an e visit on the 6th and thought viral so it still hasn't gotten better and he is coughing and mucous is yellowish green. Pt to go to  and call back if any other questions or concerns                Reason for Disposition   [1] Sinus pain (not just congestion) AND [2] fever   [1] MILD difficulty breathing (e.g., minimal/no SOB at rest, SOB with walking, pulse <100) AND [2] still present when not coughing    Additional Information   Negative: SEVERE difficulty breathing (e.g., struggling for each breath, speaks in single words)   Negative: Sounds like a life-threatening emergency to the triager   Negative: [1] Difficulty breathing AND [2] not from stuffy nose (e.g., not relieved by cleaning out the nose)   Negative: [1] SEVERE headache AND [2] fever   Negative: [1] Redness or swelling on the cheek, forehead or around the eye AND [2] fever   Negative: Fever > 104 F (40 C)   Negative: Patient sounds very sick or weak to the triager   Negative: [1] SEVERE pain AND [2] not improved 2 hours after pain medicine   Negative: [1] Redness or swelling on the cheek, forehead or around the eye AND [2] no fever   Negative: [1] Fever > 101 F (38.3 C) AND [2] age > 60 years   Negative: [1] Fever > 100 F (37.8 C) AND [2] bedridden (e.g., CVA, chronic illness, recovering from surgery)   Negative: [1] Fever > 100 F (37.8 C) AND [2] diabetes mellitus or weak immune system (e.g., HIV positive, cancer chemo, splenectomy, organ transplant, chronic steroids)   Negative: Fever present > 3 days (72 hours)   Negative: [1] Fever returns after gone for over 24 hours AND [2] symptoms worse or not improved   Negative: SEVERE difficulty breathing (e.g., struggling for each breath, speaks in single words)   Negative: Bluish (or gray) lips or face now   Negative: [1] Difficulty breathing AND [2] exposure to flames,  smoke, or fumes   Negative: [1] Stridor AND [2] difficulty breathing   Negative: Sounds like a life-threatening emergency to the triager   Negative: [1] MODERATE difficulty breathing (e.g., speaks in phrases, SOB even at rest, pulse 100-120) AND [2] still present when not coughing   Negative: Chest pain  (Exception: MILD central chest pain, present only when coughing.)   Negative: Patient sounds very sick or weak to the triager    Protocols used: Sinus Pain or Congestion-A-AH, Cough - Acute Icedxykkry-P-VV

## 2024-12-22 NOTE — PATIENT INSTRUCTIONS
Nasal saline, Flonase nasal spray, Claritin OTC as directed  Take Tylenol or ibuprofen OTC for fever and pain as directed  take antibiotic if prescribed to completion.  Doxycycline  Take Mucinex OTC for cough as directed or prescription promethazine DM preferably at night   follow up with your primary care provider within 2-5 days if your signs and symptoms have not resolved or worsen.   If condition worsens or fails to improve we recommend that you receive another evaluation with your primary medical clinic to discuss your concerns.

## 2025-02-10 DIAGNOSIS — M48.062 LUMBAR STENOSIS WITH NEUROGENIC CLAUDICATION: ICD-10-CM

## 2025-02-10 DIAGNOSIS — M48.02 STENOSIS OF CERVICAL SPINE WITH MYELOPATHY: ICD-10-CM

## 2025-02-10 DIAGNOSIS — G99.2 STENOSIS OF CERVICAL SPINE WITH MYELOPATHY: ICD-10-CM

## 2025-02-10 RX ORDER — GABAPENTIN 300 MG/1
300 CAPSULE ORAL 2 TIMES DAILY
Qty: 60 CAPSULE | Refills: 0 | Status: SHIPPED | OUTPATIENT
Start: 2025-02-10

## 2025-02-10 RX ORDER — ORPHENADRINE CITRATE 100 MG/1
TABLET, EXTENDED RELEASE ORAL
Qty: 60 TABLET | Refills: 0 | Status: SHIPPED | OUTPATIENT
Start: 2025-02-10

## 2025-03-27 ENCOUNTER — LAB VISIT (OUTPATIENT)
Dept: LAB | Facility: HOSPITAL | Age: 55
End: 2025-03-27
Attending: INTERNAL MEDICINE
Payer: COMMERCIAL

## 2025-03-27 DIAGNOSIS — J82.83 EOSINOPHILIC ASTHMA: ICD-10-CM

## 2025-03-27 PROCEDURE — 82785 ASSAY OF IGE: CPT

## 2025-03-27 PROCEDURE — 36415 COLL VENOUS BLD VENIPUNCTURE: CPT

## 2025-03-28 LAB — PHADIOTOP IGE QN: 166 KU/L

## 2025-03-29 DIAGNOSIS — M48.062 LUMBAR STENOSIS WITH NEUROGENIC CLAUDICATION: ICD-10-CM

## 2025-03-29 DIAGNOSIS — G99.2 STENOSIS OF CERVICAL SPINE WITH MYELOPATHY: ICD-10-CM

## 2025-03-29 DIAGNOSIS — M48.02 STENOSIS OF CERVICAL SPINE WITH MYELOPATHY: ICD-10-CM

## 2025-03-31 RX ORDER — GABAPENTIN 300 MG/1
300 CAPSULE ORAL 2 TIMES DAILY
Qty: 60 CAPSULE | Refills: 0 | Status: SHIPPED | OUTPATIENT
Start: 2025-03-31

## 2025-03-31 NOTE — TELEPHONE ENCOUNTER
Last appt: 1/22/24 (in office) Pt has done 2 E visits since that appt.  No upcoming appt scheduled.

## 2025-04-11 ENCOUNTER — OFFICE VISIT (OUTPATIENT)
Dept: PRIMARY CARE CLINIC | Facility: CLINIC | Age: 55
End: 2025-04-11
Payer: COMMERCIAL

## 2025-04-11 DIAGNOSIS — Z11.59 NEED FOR HEPATITIS C SCREENING TEST: ICD-10-CM

## 2025-04-11 DIAGNOSIS — Z00.00 WELLNESS EXAMINATION: ICD-10-CM

## 2025-04-11 DIAGNOSIS — E78.00 HYPERCHOLESTEROLEMIA: ICD-10-CM

## 2025-04-11 DIAGNOSIS — G99.2 STENOSIS OF CERVICAL SPINE WITH MYELOPATHY: ICD-10-CM

## 2025-04-11 DIAGNOSIS — J30.2 SEASONAL ALLERGIC RHINITIS, UNSPECIFIED TRIGGER: ICD-10-CM

## 2025-04-11 DIAGNOSIS — M48.062 LUMBAR STENOSIS WITH NEUROGENIC CLAUDICATION: ICD-10-CM

## 2025-04-11 DIAGNOSIS — K21.9 GASTROESOPHAGEAL REFLUX DISEASE, UNSPECIFIED WHETHER ESOPHAGITIS PRESENT: Primary | ICD-10-CM

## 2025-04-11 DIAGNOSIS — M48.02 STENOSIS OF CERVICAL SPINE WITH MYELOPATHY: ICD-10-CM

## 2025-04-11 DIAGNOSIS — I10 PRIMARY HYPERTENSION: ICD-10-CM

## 2025-04-11 DIAGNOSIS — Z12.5 SCREENING FOR MALIGNANT NEOPLASM OF PROSTATE: ICD-10-CM

## 2025-04-11 DIAGNOSIS — Z11.4 SCREENING FOR HIV (HUMAN IMMUNODEFICIENCY VIRUS): ICD-10-CM

## 2025-04-11 RX ORDER — ORPHENADRINE CITRATE 100 MG/1
100 TABLET, EXTENDED RELEASE ORAL 2 TIMES DAILY PRN
Qty: 180 TABLET | Refills: 1 | Status: SHIPPED | OUTPATIENT
Start: 2025-04-11

## 2025-04-11 RX ORDER — AZELASTINE 1 MG/ML
2 SPRAY, METERED NASAL NIGHTLY
Qty: 30 ML | Refills: 1 | Status: SHIPPED | OUTPATIENT
Start: 2025-04-11 | End: 2026-04-11

## 2025-04-11 RX ORDER — PANTOPRAZOLE SODIUM 20 MG/1
20 TABLET, DELAYED RELEASE ORAL DAILY
Qty: 90 TABLET | Refills: 3 | Status: SHIPPED | OUTPATIENT
Start: 2025-04-11 | End: 2026-04-11

## 2025-04-11 RX ORDER — OLMESARTAN MEDOXOMIL 5 MG/1
5 TABLET, FILM COATED ORAL 2 TIMES DAILY
Qty: 180 TABLET | Refills: 1 | Status: SHIPPED | OUTPATIENT
Start: 2025-04-11

## 2025-04-11 RX ORDER — ROSUVASTATIN CALCIUM 10 MG/1
10 TABLET, COATED ORAL NIGHTLY
Qty: 90 TABLET | Refills: 3 | Status: SHIPPED | OUTPATIENT
Start: 2025-04-11

## 2025-04-11 RX ORDER — GABAPENTIN 300 MG/1
300 CAPSULE ORAL 2 TIMES DAILY
Qty: 180 CAPSULE | Refills: 1 | Status: SHIPPED | OUTPATIENT
Start: 2025-04-11

## 2025-04-11 NOTE — PROGRESS NOTES
Established Patient - Telehealth Visit    Patient ID: Eb Alfaro is a 54 y.o. male    The patient location is: [place]   The chief complaint leading to consultation is: Chronic back pain, HTN, GERD  Visit type: Virtual visit  Total time spent with patient: 33 min (Greater than 50% of that time was used for education and counseling regarding medical conditions, current medications including side effects/adverse events, OTC medications uses/doses, home self care, red flag symptoms, and indications for immediate medical attention. The patient is receptive and expressed understanding and is agreeable to the plan. All questions answered before conclusion of this visit)    Each patient to whom I provide medical services by telemedicine is:  (1) informed of the relationship between the physician and patient and the respective role of any other health care provider with respect to management of the patient; and (2) notified that they may decline to receive medical services by telemedicine and may withdraw from such care at any time. Patient verbally consented to receive this service via video call.    HPI:  Pt not seen in > 1 year. Needs refills on most medications. Still w/chronic back pain. Requests refill on GERD medication. We discussed BP management. Made plan for f/u visit.       Review of Systems   Constitutional:  Positive for unexpected weight change. Negative for activity change.   HENT:  Positive for hearing loss and rhinorrhea. Negative for trouble swallowing.    Eyes:  Negative for discharge and visual disturbance.   Respiratory:  Negative for chest tightness and wheezing.    Cardiovascular:  Negative for chest pain and palpitations.   Gastrointestinal:  Negative for blood in stool, constipation, diarrhea and vomiting.   Endocrine: Negative for polydipsia and polyuria.   Genitourinary:  Negative for difficulty urinating and hematuria.   Musculoskeletal:  Negative for arthralgias, joint swelling and  neck pain.   Neurological:  Negative for weakness and headaches.   Psychiatric/Behavioral:  Negative for confusion and dysphoric mood.         Physical Exam:  Constitutional: No apparent distress, alert and oriented x3  Eye: Sclera white, pupils symmetric    Respiratory: No obvious shortness of breath, speaking in full sentences without difficulty  Integumentary: Tone normal for ethnicity, no rash or lesions noted  Neurologic: Speech clear and cognition intact   Psychiatric: Mood is appropriate     Assessment & Plan:  1. Gastroesophageal reflux disease, unspecified whether esophagitis present  -     pantoprazole (PROTONIX) 20 MG tablet; Take 1 tablet (20 mg total) by mouth once daily.  Dispense: 90 tablet; Refill: 3  -     Comprehensive Metabolic Panel; Future; Expected date: 06/28/2025    2. Seasonal allergic rhinitis, unspecified trigger  -     azelastine (ASTELIN) 137 mcg (0.1 %) nasal spray; 2 sprays (274 mcg total) by Nasal route nightly.  Dispense: 30 mL; Refill: 1    3. Stenosis of cervical spine with myelopathy  -     orphenadrine (NORFLEX) 100 mg tablet; Take 1 tablet (100 mg total) by mouth 2 (two) times daily as needed for Muscle spasms.  Dispense: 180 tablet; Refill: 1  -     gabapentin (NEURONTIN) 300 MG capsule; Take 1 capsule (300 mg total) by mouth 2 (two) times daily. APPOINTMENT NEEDED  Dispense: 180 capsule; Refill: 1    4. Lumbar stenosis with neurogenic claudication  -     orphenadrine (NORFLEX) 100 mg tablet; Take 1 tablet (100 mg total) by mouth 2 (two) times daily as needed for Muscle spasms.  Dispense: 180 tablet; Refill: 1  -     gabapentin (NEURONTIN) 300 MG capsule; Take 1 capsule (300 mg total) by mouth 2 (two) times daily. APPOINTMENT NEEDED  Dispense: 180 capsule; Refill: 1    5. Primary hypertension  Overview:  Intolerant of ACE-I and BB in past    Orders:  -     olmesartan (BENICAR) 5 MG Tab; Take 1 tablet (5 mg total) by mouth 2 (two) times daily.  Dispense: 180 tablet; Refill: 1  -      Comprehensive Metabolic Panel; Future; Expected date: 06/28/2025  -     CBC Auto Differential; Future; Expected date: 06/28/2025    6. Hypercholesterolemia  -     rosuvastatin (CRESTOR) 10 MG tablet; Take 1 tablet (10 mg total) by mouth every evening.  Dispense: 90 tablet; Refill: 3  -     Lipid Panel; Future; Expected date: 06/28/2025    7. Wellness examination  -     Comprehensive Metabolic Panel; Future; Expected date: 06/28/2025  -     CBC Auto Differential; Future; Expected date: 06/28/2025  -     Hemoglobin A1C; Future; Expected date: 06/28/2025  -     Lipid Panel; Future; Expected date: 06/28/2025  -     TSH; Future; Expected date: 06/28/2025  -     Urinalysis; Future; Expected date: 06/28/2025    8. Screening for malignant neoplasm of prostate  -     PSA, Screening; Future; Expected date: 06/28/2025    9. Screening for HIV (human immunodeficiency virus)  -     HIV 1/2 Ag/Ab (4th Gen); Future; Expected date: 06/28/2025    10. Need for hepatitis C screening test  -     Hepatitis C Antibody; Future; Expected date: 06/28/2025       MDM moderate    This service was not originating from a related E/M service provided within the previous 7 days nor will  to an E/M service or procedure within the next 24 hours or my soonest available appointment.  Prevailing standard of care was able to be met in this virtual visit.      F/u in July for Wellness.

## 2025-04-17 ENCOUNTER — OFFICE VISIT (OUTPATIENT)
Dept: URGENT CARE | Facility: CLINIC | Age: 55
End: 2025-04-17
Payer: COMMERCIAL

## 2025-04-17 VITALS
RESPIRATION RATE: 20 BRPM | HEART RATE: 87 BPM | HEIGHT: 72 IN | BODY MASS INDEX: 33.59 KG/M2 | TEMPERATURE: 98 F | OXYGEN SATURATION: 99 % | DIASTOLIC BLOOD PRESSURE: 103 MMHG | WEIGHT: 248 LBS | SYSTOLIC BLOOD PRESSURE: 145 MMHG

## 2025-04-17 DIAGNOSIS — J06.9 VIRAL UPPER RESPIRATORY TRACT INFECTION: ICD-10-CM

## 2025-04-17 DIAGNOSIS — R05.1 ACUTE COUGH: Primary | ICD-10-CM

## 2025-04-17 RX ORDER — PROMETHAZINE HYDROCHLORIDE AND DEXTROMETHORPHAN HYDROBROMIDE 6.25; 15 MG/5ML; MG/5ML
5 SYRUP ORAL EVERY 4 HOURS PRN
Qty: 120 ML | Refills: 0 | Status: SHIPPED | OUTPATIENT
Start: 2025-04-17 | End: 2025-04-27

## 2025-04-17 RX ORDER — BETAMETHASONE SODIUM PHOSPHATE AND BETAMETHASONE ACETATE 3; 3 MG/ML; MG/ML
9 INJECTION, SUSPENSION INTRA-ARTICULAR; INTRALESIONAL; INTRAMUSCULAR; SOFT TISSUE
Status: COMPLETED | OUTPATIENT
Start: 2025-04-17 | End: 2025-04-17

## 2025-04-17 RX ORDER — DUPILUMAB 300 MG/2ML
INJECTION, SOLUTION SUBCUTANEOUS
COMMUNITY
Start: 2025-04-07

## 2025-04-17 RX ADMIN — BETAMETHASONE SODIUM PHOSPHATE AND BETAMETHASONE ACETATE 9 MG: 3; 3 INJECTION, SUSPENSION INTRA-ARTICULAR; INTRALESIONAL; INTRAMUSCULAR; SOFT TISSUE at 05:04

## 2025-04-17 NOTE — PROGRESS NOTES
Subjective:      Patient ID: Eb Alfaro is a 54 y.o. male.    Vitals:  height is 6' (1.829 m) and weight is 112.5 kg (248 lb). His temperature is 97.8 °F (36.6 °C). His blood pressure is 145/103 (abnormal) and his pulse is 87. His respiration is 20 and oxygen saturation is 99%.     Chief Complaint: Cough    54 y.o. male presents to clinic with c/o nasal drip, cough, mucus starting a week ago. Pt has not tried any otc meds. No testing.       Constitution: Negative for fever.   HENT:  Positive for congestion.    Respiratory:  Positive for cough. Negative for shortness of breath.       Objective:     Physical Exam   Constitutional: He is oriented to person, place, and time. He appears well-developed. He is cooperative.  Non-toxic appearance. He does not appear ill. No distress.   HENT:   Head: Normocephalic and atraumatic.   Ears:   Right Ear: Hearing, tympanic membrane, external ear and ear canal normal.   Left Ear: Hearing, tympanic membrane, external ear and ear canal normal.   Nose: Nose normal. No mucosal edema, rhinorrhea or nasal deformity. No epistaxis. Right sinus exhibits no maxillary sinus tenderness and no frontal sinus tenderness. Left sinus exhibits no maxillary sinus tenderness and no frontal sinus tenderness.   Mouth/Throat: Uvula is midline, oropharynx is clear and moist and mucous membranes are normal. No trismus in the jaw. Normal dentition. No uvula swelling. No oropharyngeal exudate, posterior oropharyngeal edema or posterior oropharyngeal erythema.   Eyes: Conjunctivae and lids are normal. No scleral icterus.   Neck: Trachea normal and phonation normal. Neck supple. No edema present. No erythema present. No neck rigidity present.   Cardiovascular: Normal rate, regular rhythm, normal heart sounds and normal pulses.   Pulmonary/Chest: Effort normal and breath sounds normal. No respiratory distress. He has no decreased breath sounds. He has no rhonchi.   Abdominal: Normal appearance.    Musculoskeletal: Normal range of motion.         General: No deformity. Normal range of motion.   Neurological: He is alert and oriented to person, place, and time. He exhibits normal muscle tone. Coordination normal.   Skin: Skin is warm, dry, intact, not diaphoretic and not pale.   Psychiatric: His speech is normal and behavior is normal. Judgment and thought content normal.   Nursing note and vitals reviewed.    Previous History:     Review of patient's allergies indicates:   Allergen Reactions    Cefdinir Anaphylaxis    Penicillins Anaphylaxis    Hydrocodone-acetaminophen Itching    Morphine Hives    Opioids - morphine analogues Hives       Past Medical History:   Diagnosis Date    Degeneration, intervertebral disc, cervicothoracic     Herniated nucleus pulposus with myelopathy, cervical     High cholesterol     Hypertension     Kidney stones     Muscle spasms of neck     PONV (postoperative nausea and vomiting)     Spinal stenosis of lumbar region with neurogenic claudication     Stenosis of cervical spine with myelopathy      Current Outpatient Medications   Medication Instructions    albuterol (VENTOLIN HFA) 90 mcg/actuation inhaler 2 puffs, Inhalation, Every 6 hours PRN, Rescue    azelastine (ASTELIN) 274 mcg, Nasal, Nightly    DUPIXENT  mg/2 mL PnIj     EPINEPHrine (EPIPEN 2-TERESA) 0.6 mg, Intramuscular, Once    fluticasone-umeclidin-vilanter (TRELEGY ELLIPTA) 200-62.5-25 mcg inhaler 1 puff, Inhalation, Daily    gabapentin (NEURONTIN) 300 mg, Oral, 2 times daily, APPOINTMENT NEEDED    levocetirizine (XYZAL) 5 mg, Nightly    olmesartan (BENICAR) 5 mg, Oral, 2 times daily    orphenadrine (NORFLEX) 100 mg, Oral, 2 times daily PRN    pantoprazole (PROTONIX) 20 mg, Oral, Daily    promethazine-dextromethorphan (PROMETHAZINE-DM) 6.25-15 mg/5 mL Syrp 5 mLs, Oral, Every 4 hours PRN    rosuvastatin (CRESTOR) 10 mg, Oral, Nightly     Past Surgical History:   Procedure Laterality Date    APPENDECTOMY       Bilateral L4-5, L5-S1 decompression; right L5-S1 microdiscectomy  03/08/2022    Dr. Rodriguez    C3-5 laminectomies, C2-C6 posterior instrumented fusion  03/08/2022    Dr. Rodriguez    CHOLECYSTECTOMY      colonocscopy  08/05/2015    Dr Yaakov Gaviria    CYSTOSCOPY W/ URETERAL STENT PLACEMENT Left 12/21/2022    Procedure: CYSTOSCOPY, WITH URETERAL STENT INSERTION;  Surgeon: Ramirez Wei MD;  Location: [facility];  Service: Urology;  Laterality: Left;  LEFT URETERAL STENT PLACEMENT    CYSTOURETEROSCOPY, WITH HOLMIUM LASER LITHOTRIPSY OF URETERAL CALCULUS AND STENT INSERTION Left 02/03/2023    Procedure: CYSTOURETEROSCOPY, WITH HOLMIUM LASER LITHOTRIPSY OF URETERAL CALCULUS AND STENT INSERTION / Stent exchange Left;  Surgeon: Ramirez Wei MD;  Location: [facility];  Service: Urology;  Laterality: Left;    LEFT HEART CATHETERIZATION  10/16/2017    MAGNETIC RESONANCE IMAGING N/A 03/22/2023    Procedure: MRI (Magnetic Resonance Imagine);  Surgeon: Yash Murphy MD;  Location: [facility];  Service: Medicine;  Laterality: N/A;  MRI LUMBAR SPINE W/O CONTRAST / MRI BRAIN W/O CONTRAST WITH ANESTHESIA    PARATHYROIDECTOMY N/A 05/06/2022    Procedure: PARATHYROIDECTOMY;  Surgeon: Ajay Blum MD;  Location: [facility];  Service: ENT;  Laterality: N/A;  PARATHYROIDECTOMY // NERVE MONITOR // FROZEN SECTION // INTRA OP PTH // SUPINE     Family History   Problem Relation Name Age of Onset    Cancer Mother          meningiomas    Liver cancer Mother      Cancer Father          colon    Liver disease Sister         Social History[1]  Assessment:     1. Acute cough    2. Viral upper respiratory tract infection        Plan:   Increase oral fluids  Nasal saline, Flonase nasal spray, Claritin OTC as directed  Take Tylenol or ibuprofen OTC for fever and pain as directed  take Mucinex expectorant OTC for cough as directed or prescription promethazine DM preferably at night   follow up with your primary care provider within 2-5  days if your signs and symptoms have not resolved or worsen.   If condition worsens or fails to improve we recommend that you receive another evaluation with your primary medical clinic to discuss your concerns.      Acute cough  -     betamethasone acetate-betamethasone sodium phosphate injection 9 mg  -     promethazine-dextromethorphan (PROMETHAZINE-DM) 6.25-15 mg/5 mL Syrp; Take 5 mLs by mouth every 4 (four) hours as needed (cough).  Dispense: 120 mL; Refill: 0    Viral upper respiratory tract infection                         [1]   Social History  Tobacco Use    Smoking status: Former     Types: Cigarettes    Smokeless tobacco: Never    Tobacco comments:     Quit in 2013   Substance Use Topics    Alcohol use: Yes     Alcohol/week: 2.0 standard drinks of alcohol     Types: 2 Standard drinks or equivalent per week     Comment: SOCIALLY    Drug use: No

## 2025-04-17 NOTE — PATIENT INSTRUCTIONS
Increase oral fluids  Nasal saline, Flonase nasal spray, Claritin OTC as directed  Take Tylenol or ibuprofen OTC for fever and pain as directed  take Mucinex expectorant OTC for cough as directed or prescription promethazine DM preferably at night   follow up with your primary care provider within 2-5 days if your signs and symptoms have not resolved or worsen.   If condition worsens or fails to improve we recommend that you receive another evaluation with your primary medical clinic to discuss your concerns.

## 2025-07-24 ENCOUNTER — OFFICE VISIT (OUTPATIENT)
Dept: PRIMARY CARE CLINIC | Facility: CLINIC | Age: 55
End: 2025-07-24
Payer: COMMERCIAL

## 2025-07-24 VITALS
WEIGHT: 255 LBS | DIASTOLIC BLOOD PRESSURE: 90 MMHG | HEART RATE: 68 BPM | RESPIRATION RATE: 15 BRPM | BODY MASS INDEX: 34.58 KG/M2 | OXYGEN SATURATION: 98 % | SYSTOLIC BLOOD PRESSURE: 150 MMHG

## 2025-07-24 DIAGNOSIS — E78.00 HYPERCHOLESTEROLEMIA: ICD-10-CM

## 2025-07-24 DIAGNOSIS — M48.02 STENOSIS OF CERVICAL SPINE WITH MYELOPATHY: ICD-10-CM

## 2025-07-24 DIAGNOSIS — M48.062 LUMBAR STENOSIS WITH NEUROGENIC CLAUDICATION: ICD-10-CM

## 2025-07-24 DIAGNOSIS — L73.9 FOLLICULITIS: ICD-10-CM

## 2025-07-24 DIAGNOSIS — G99.2 STENOSIS OF CERVICAL SPINE WITH MYELOPATHY: ICD-10-CM

## 2025-07-24 DIAGNOSIS — G57.10 MERALGIA PARESTHETICA, UNSPECIFIED LATERALITY: ICD-10-CM

## 2025-07-24 DIAGNOSIS — I10 PRIMARY HYPERTENSION: ICD-10-CM

## 2025-07-24 DIAGNOSIS — Z00.00 WELLNESS EXAMINATION: Primary | ICD-10-CM

## 2025-07-24 DIAGNOSIS — K13.0 LIP LESION: ICD-10-CM

## 2025-07-24 PROCEDURE — 3080F DIAST BP >= 90 MM HG: CPT | Mod: CPTII,,, | Performed by: STUDENT IN AN ORGANIZED HEALTH CARE EDUCATION/TRAINING PROGRAM

## 2025-07-24 PROCEDURE — 4010F ACE/ARB THERAPY RXD/TAKEN: CPT | Mod: CPTII,,, | Performed by: STUDENT IN AN ORGANIZED HEALTH CARE EDUCATION/TRAINING PROGRAM

## 2025-07-24 PROCEDURE — 3077F SYST BP >= 140 MM HG: CPT | Mod: CPTII,,, | Performed by: STUDENT IN AN ORGANIZED HEALTH CARE EDUCATION/TRAINING PROGRAM

## 2025-07-24 PROCEDURE — 1159F MED LIST DOCD IN RCRD: CPT | Mod: CPTII,,, | Performed by: STUDENT IN AN ORGANIZED HEALTH CARE EDUCATION/TRAINING PROGRAM

## 2025-07-24 PROCEDURE — 99396 PREV VISIT EST AGE 40-64: CPT | Mod: ,,, | Performed by: STUDENT IN AN ORGANIZED HEALTH CARE EDUCATION/TRAINING PROGRAM

## 2025-07-24 PROCEDURE — 1160F RVW MEDS BY RX/DR IN RCRD: CPT | Mod: CPTII,,, | Performed by: STUDENT IN AN ORGANIZED HEALTH CARE EDUCATION/TRAINING PROGRAM

## 2025-07-24 PROCEDURE — 3008F BODY MASS INDEX DOCD: CPT | Mod: CPTII,,, | Performed by: STUDENT IN AN ORGANIZED HEALTH CARE EDUCATION/TRAINING PROGRAM

## 2025-07-24 RX ORDER — ROSUVASTATIN CALCIUM 10 MG/1
10 TABLET, COATED ORAL NIGHTLY
Qty: 90 TABLET | Refills: 3 | Status: SHIPPED | OUTPATIENT
Start: 2025-07-24

## 2025-07-24 RX ORDER — CLINDAMYCIN PHOSPHATE 11.9 MG/ML
SOLUTION TOPICAL 2 TIMES DAILY
Qty: 60 ML | Refills: 0 | Status: SHIPPED | OUTPATIENT
Start: 2025-07-24

## 2025-07-24 RX ORDER — ORPHENADRINE CITRATE 100 MG/1
100 TABLET, EXTENDED RELEASE ORAL 2 TIMES DAILY PRN
Qty: 180 TABLET | Refills: 1 | Status: SHIPPED | OUTPATIENT
Start: 2025-07-24

## 2025-07-24 RX ORDER — GABAPENTIN 300 MG/1
300 CAPSULE ORAL 3 TIMES DAILY
Qty: 270 CAPSULE | Refills: 1 | Status: SHIPPED | OUTPATIENT
Start: 2025-07-24

## 2025-07-24 RX ORDER — OLMESARTAN MEDOXOMIL 20 MG/1
10 TABLET ORAL 2 TIMES DAILY
Qty: 90 TABLET | Refills: 3 | Status: SHIPPED | OUTPATIENT
Start: 2025-07-24

## 2025-07-24 NOTE — PROGRESS NOTES
ANNUAL WELLNESS NOTE    Chief Complaint:  Wellness     HPI:  Eb Alfaro is a 54 y.o. male    -------------------------------------    Degeneration, intervertebral disc, cervicothoracic    Herniated nucleus pulposus with myelopathy, cervical    High cholesterol    Hypertension    Kidney stones    Muscle spasms of neck    PONV (postoperative nausea and vomiting)    Spinal stenosis of lumbar region with neurogenic claudication    Stenosis of cervical spine with myelopathy       Patient Care Team:  Nick Monzon MD as PCP - General (Internal Medicine)  Yaakov Gaviria MD as Consulting Physician (Gastroenterology)  Brando Cheema MD as Consulting Physician (Endocrinology)  Ramirez Wei MD as Consulting Physician (Urology)  Lynsey Santos LPN as Care Coordinator    Presents today for wellness visit. Describes overall health as good. Diet is not healthy, eats out most days for lunch, heavy foods. Exercises never but somewhat active at work and yard work. Tobacco use: previous but has quit. Alcohol use: few drinks on weekend. Caffeine intake: minimal/rare. Eye exam: overdue. Dental exam: overdue.    C/o rash to B/L forearms. Started after cutting grass around base of an oak tree where there was some weed overgrowth. Itching started shortly after this.     C/o burning in B/L thighs mostly anterior and lateral. Improves if he sits and rests for a few minutes. Similar symptoms prior to spine surgery, resolved for a few years, now worsening again.        Review of Systems   Constitutional:  Negative for chills and fever.   HENT:  Negative for sinus pressure/congestion and sore throat.    Respiratory:  Negative for shortness of breath and wheezing.    Cardiovascular:  Negative for chest pain and leg swelling.   Gastrointestinal:  Negative for abdominal pain, nausea and vomiting.   Genitourinary:  Negative for dysuria and hematuria.   Musculoskeletal:  Positive for leg pain. Negative for arthralgias and  "myalgias.   Integumentary:  Positive for rash.   Neurological:  Negative for dizziness and syncope.   Psychiatric/Behavioral:  Negative for confusion. The patient is not nervous/anxious.        Physical Exam  Vitals and nursing note reviewed.   Constitutional:       General: He is not in acute distress.     Appearance: He is obese.   HENT:      Head: Normocephalic.      Nose: No rhinorrhea.      Mouth/Throat:      Comments: Midline lower lip eschar approx 1 cm diameter. Fairly fresh appearing eschar. Pt says there was a "spot" here before. Concern for malignant lesion given history but very difficult to tell from this exam.  Eyes:      Conjunctiva/sclera: Conjunctivae normal.      Pupils: Pupils are equal, round, and reactive to light.   Cardiovascular:      Rate and Rhythm: Normal rate and regular rhythm.   Pulmonary:      Effort: Pulmonary effort is normal.      Breath sounds: Normal breath sounds.   Abdominal:      Palpations: Abdomen is soft.      Tenderness: There is no abdominal tenderness.   Musculoskeletal:         General: No deformity or signs of injury.   Skin:     General: Skin is warm and dry.      Comments: B/L forearms have small eschars in various stages of healing with scarring of similar distribution and size. Single, shallow, punctate pustule on R forearm (pt says this is how lesions start)   Neurological:      General: No focal deficit present.      Mental Status: He is alert. Mental status is at baseline.   Psychiatric:         Mood and Affect: Mood normal.         Behavior: Behavior normal.         Assessment/Plan:  1. Wellness examination  Assessment & Plan:  Patient completed wellness labs immediately prior to visit, so they're not resulted yet. Will review at a later date. See below for health maintenance.    Vaccinations:   - Flu: due this fall, typically does not receive   - Pneumonia: discussed, pt declines    - Shingles (>50): recommended he receive at pharmacy   - Tdap: received 2022   " - COVID: did not receive  Screening:   - Prostate (>45): PSA pending   - Lung Ca. Screening (50-80 with >20 pack yrs current or quit <15 yrs): follows pulmonology   - Colon Ca. Screening (>45): due 2029   - AAA (65-75 if ever smoked):    - Cardiac: discussed options, pt will consider CT calcium score   - Hep. C, HIV: pending      2. Folliculitis  -     clindamycin (CLEOCIN T) 1 % external solution; Apply topically 2 (two) times daily. Apply to affected areas on forearms for up to 7 days.  Dispense: 60 mL; Refill: 0    3. Lip lesion  -     Ambulatory referral/consult to Dermatology    4. Hypercholesterolemia  -     rosuvastatin (CRESTOR) 10 MG tablet; Take 1 tablet (10 mg total) by mouth every evening.  Dispense: 90 tablet; Refill: 3    5. Stenosis of cervical spine with myelopathy  -     orphenadrine (NORFLEX) 100 mg tablet; Take 1 tablet (100 mg total) by mouth 2 (two) times daily as needed for Muscle spasms.  Dispense: 180 tablet; Refill: 1  -     gabapentin (NEURONTIN) 300 MG capsule; Take 1 capsule (300 mg total) by mouth 3 (three) times daily.  Dispense: 270 capsule; Refill: 1    6. Lumbar stenosis with neurogenic claudication  -     orphenadrine (NORFLEX) 100 mg tablet; Take 1 tablet (100 mg total) by mouth 2 (two) times daily as needed for Muscle spasms.  Dispense: 180 tablet; Refill: 1  -     gabapentin (NEURONTIN) 300 MG capsule; Take 1 capsule (300 mg total) by mouth 3 (three) times daily.  Dispense: 270 capsule; Refill: 1    7. Primary hypertension  Overview:  Intolerant of ACE-I and BB in past    Orders:  -     olmesartan (BENICAR) 20 MG tablet; Take 0.5 tablets (10 mg total) by mouth 2 (two) times daily.  Dispense: 90 tablet; Refill: 3    8. Meralgia paresthetica, unspecified laterality  Comments:  recommended stretching/self PT. Increasing gabapentin. If persistent consider PT referral.    Follow up in about 3 months (around 10/24/2025) for HTN.

## 2025-07-24 NOTE — ASSESSMENT & PLAN NOTE
Patient completed wellness labs immediately prior to visit, so they're not resulted yet. Will review at a later date. See below for health maintenance.    Vaccinations:   - Flu: due this fall, typically does not receive   - Pneumonia: discussed, pt declines    - Shingles (>50): recommended he receive at pharmacy   - Tdap: received 2022   - COVID: did not receive  Screening:   - Prostate (>45): PSA pending   - Lung Ca. Screening (50-80 with >20 pack yrs current or quit <15 yrs): follows pulmonology   - Colon Ca. Screening (>45): due 2029   - AAA (65-75 if ever smoked):    - Cardiac: discussed options, pt will consider CT calcium score   - Hep. C, HIV: pending

## (undated) DEVICE — SYR 10CC LUER LOCK

## (undated) DEVICE — SOL IRRI STRL WATER 1000ML

## (undated) DEVICE — RESERVOIR JACKSON-PRATT 100CC

## (undated) DEVICE — BOWL STERILE LARGE 32OZ

## (undated) DEVICE — SUPPORT ULNA NERVE PROTECTOR

## (undated) DEVICE — GAUZE SPONGE 4X4 12PLY

## (undated) DEVICE — APPLICATOR CHLORAPREP ORN 26ML

## (undated) DEVICE — JUMPSUIT SURG W/TIE UNIV

## (undated) DEVICE — ADHESIVE MASTISOL VIAL 48/BX

## (undated) DEVICE — BAG DRAIN UROLOGY AND HOSE

## (undated) DEVICE — SOL NACL IRR 1000ML BTL

## (undated) DEVICE — TRAY SKIN SCRUB WET PREMIUM

## (undated) DEVICE — Device

## (undated) DEVICE — GLOVE PROTEXIS HYDROGEL SZ8

## (undated) DEVICE — GAUZE SPONGE 4X4 8 PLY NS

## (undated) DEVICE — EXTRACTOR STONE 4WR NIT 2.2F 1

## (undated) DEVICE — SHEATH FLEXOR ACCESS 12X35

## (undated) DEVICE — DRESSING TRANS 6X8 TEGADERM

## (undated) DEVICE — SOL IRRIGATION WATER 3000ML

## (undated) DEVICE — NDL 27G X 1 1/4

## (undated) DEVICE — POSITIONER HEAD ADULT

## (undated) DEVICE — LUBRICANT SURGILUBE 2 OZ

## (undated) DEVICE — SPNG CHERRY DISECT XRAY DTECT

## (undated) DEVICE — KIT SURGICAL TURNOVER

## (undated) DEVICE — CONTAINER SPECIMEN SCREW 4OZ

## (undated) DEVICE — SOL NACL IRR 3000ML

## (undated) DEVICE — CORD BIPOLAR 12 FOOT

## (undated) DEVICE — PROBE SIMULATOR KRAFF

## (undated) DEVICE — GUIDEWIRE STR TIP HIWIRE 150CM

## (undated) DEVICE — SOL IRR WATER STRL 3000 ML

## (undated) DEVICE — STOPCOCK IV 4 WAY LG BOR ROT M

## (undated) DEVICE — SUT 3-0 12-18IN SILK

## (undated) DEVICE — MARKER WRITESITE SKIN CHLRAPRP

## (undated) DEVICE — ADAPTER STOPCOCK FEMALE LL

## (undated) DEVICE — DRESSING TELFA N ADH 3X8IN

## (undated) DEVICE — GLOVE PROTEXIS HYDROGEL SZ7.5

## (undated) DEVICE — SUT MCRYL PLUS 4-0 PS2 27IN

## (undated) DEVICE — ELECTRODE NDL EDGE 2 5/6IN

## (undated) DEVICE — SUT PROLENE 3-0 FS-1 MONO18

## (undated) DEVICE — DRAIN JACKSON PRATT TRCR 10FR

## (undated) DEVICE — CATH POLLACK OPEN-END FLEXI-TI

## (undated) DEVICE — CLOSURE SKIN STERI STRIP 1/2X4

## (undated) DEVICE — GLOVE PROTEXIS LTX MICRO 8

## (undated) DEVICE — SYR 30CC LUER LOCK

## (undated) DEVICE — SUT 2-0 12-18IN SILK